# Patient Record
Sex: FEMALE | Race: WHITE | Employment: OTHER | URBAN - METROPOLITAN AREA
[De-identification: names, ages, dates, MRNs, and addresses within clinical notes are randomized per-mention and may not be internally consistent; named-entity substitution may affect disease eponyms.]

---

## 2017-03-09 ENCOUNTER — HOSPITAL ENCOUNTER (OUTPATIENT)
Dept: CT IMAGING | Age: 77
Discharge: HOME | End: 2017-03-09
Payer: SELF-PAY

## 2017-03-09 DIAGNOSIS — I10 HYPERTENSION: ICD-10-CM

## 2017-03-09 DIAGNOSIS — E78.5 HYPERLIPIDEMIA: ICD-10-CM

## 2017-03-09 PROCEDURE — 75571 CT HRT W/O DYE W/CA TEST: CPT

## 2017-03-10 NOTE — CARDIO/PULMONARY
Cardiopulmonary Rehab: I reached this patient by phone and shared her coronary calcium CT score of \"126 \" with her. Education given regarding coronary artery disease and its effects on the cardiovascular system were reviewed. Patient states she is not a smoker. Patient states that she has a family history of vascular disease, reports she is currently taking cholesterol medication and blood pressure medication. Patient states she is not a diabetic, is 20 pounds over weight, reports making heart healthy diet choices and is regularly physically active 2 hours per week. We discussed the recommendations for and potential benefits of weight loss and regular physical activity. Patient does not feel that stress is a risk factor for her. Patient to follow up with primary care physician. Understanding verbalized and no further questions at this time.

## 2017-04-11 ENCOUNTER — HOSPITAL ENCOUNTER (OUTPATIENT)
Dept: MAMMOGRAPHY | Age: 77
Discharge: HOME | End: 2017-04-11
Attending: FAMILY MEDICINE
Payer: MEDICARE

## 2017-04-11 DIAGNOSIS — Z12.31 VISIT FOR SCREENING MAMMOGRAM: ICD-10-CM

## 2017-04-11 PROCEDURE — 77067 SCR MAMMO BI INCL CAD: CPT

## 2018-05-15 ENCOUNTER — HOSPITAL ENCOUNTER (OUTPATIENT)
Dept: MAMMOGRAPHY | Age: 78
Discharge: HOME | End: 2018-05-15
Attending: FAMILY MEDICINE
Payer: MEDICARE

## 2018-05-15 DIAGNOSIS — Z12.31 VISIT FOR SCREENING MAMMOGRAM: ICD-10-CM

## 2018-05-15 PROCEDURE — 77067 SCR MAMMO BI INCL CAD: CPT

## 2019-04-07 ENCOUNTER — HOSPITAL ENCOUNTER (INPATIENT)
Age: 79
LOS: 1 days | Discharge: HOME | DRG: 287 | End: 2019-04-09
Attending: EMERGENCY MEDICINE | Admitting: INTERNAL MEDICINE
Payer: MEDICARE

## 2019-04-07 DIAGNOSIS — R07.9 CHEST PAIN, UNSPECIFIED TYPE: Primary | ICD-10-CM

## 2019-04-07 DIAGNOSIS — I10 HYPERTENSION, UNSPECIFIED TYPE: ICD-10-CM

## 2019-04-07 DIAGNOSIS — I44.7 LBBB (LEFT BUNDLE BRANCH BLOCK): ICD-10-CM

## 2019-04-07 PROCEDURE — 99285 EMERGENCY DEPT VISIT HI MDM: CPT

## 2019-04-07 PROCEDURE — 93005 ELECTROCARDIOGRAM TRACING: CPT

## 2019-04-07 NOTE — Clinical Note
TRANSFER - OUT REPORT:  
 
Verbal report given to: cece candelaria. Report consisted of patient's Situation, Background, Assessment and  
Recommendations(SBAR). Opportunity for questions and clarification was provided. Patient transported with a Registered Nurse and Monitor. Patient transported to: ccu.

## 2019-04-07 NOTE — Clinical Note
Sheath #1: Closed using manual compression. Site secured by Tegaderm. Pressure held for: 12 minutes.

## 2019-04-08 ENCOUNTER — APPOINTMENT (OUTPATIENT)
Dept: GENERAL RADIOLOGY | Age: 79
DRG: 287 | End: 2019-04-08
Attending: EMERGENCY MEDICINE
Payer: MEDICARE

## 2019-04-08 ENCOUNTER — APPOINTMENT (OUTPATIENT)
Dept: NON INVASIVE DIAGNOSTICS | Age: 79
DRG: 287 | End: 2019-04-08
Attending: INTERNAL MEDICINE
Payer: MEDICARE

## 2019-04-08 PROBLEM — I10 UNCONTROLLED HYPERTENSION: Status: ACTIVE | Noted: 2019-04-08

## 2019-04-08 LAB
ACT BLD: 147 SECS (ref 79–138)
ALBUMIN SERPL-MCNC: 3.5 G/DL (ref 3.5–5)
ALBUMIN/GLOB SERPL: 0.9 {RATIO} (ref 1.1–2.2)
ALP SERPL-CCNC: 67 U/L (ref 45–117)
ALT SERPL-CCNC: 18 U/L (ref 12–78)
ANION GAP SERPL CALC-SCNC: 6 MMOL/L (ref 5–15)
APTT PPP: 24.3 SEC (ref 22.1–32)
AST SERPL-CCNC: 15 U/L (ref 15–37)
ATRIAL RATE: 72 BPM
ATRIAL RATE: 74 BPM
BASOPHILS # BLD: 0.1 K/UL (ref 0–0.1)
BASOPHILS NFR BLD: 1 % (ref 0–1)
BILIRUB SERPL-MCNC: 0.4 MG/DL (ref 0.2–1)
BNP SERPL-MCNC: 615 PG/ML (ref 0–450)
BUN SERPL-MCNC: 19 MG/DL (ref 6–20)
BUN SERPL-MCNC: 24 MG/DL (ref 6–20)
BUN/CREAT SERPL: 22 (ref 12–20)
CALCIUM SERPL-MCNC: 9.3 MG/DL (ref 8.5–10.1)
CALCULATED P AXIS, ECG09: 43 DEGREES
CALCULATED P AXIS, ECG09: 65 DEGREES
CALCULATED R AXIS, ECG10: -50 DEGREES
CALCULATED R AXIS, ECG10: -52 DEGREES
CALCULATED T AXIS, ECG11: 103 DEGREES
CALCULATED T AXIS, ECG11: 121 DEGREES
CHLORIDE SERPL-SCNC: 101 MMOL/L (ref 97–108)
CO2 SERPL-SCNC: 31 MMOL/L (ref 21–32)
COMMENT, HOLDF: NORMAL
CREAT SERPL-MCNC: 1.07 MG/DL (ref 0.55–1.02)
DIAGNOSIS, 93000: NORMAL
DIAGNOSIS, 93000: NORMAL
DIFFERENTIAL METHOD BLD: ABNORMAL
ECHO LA MAJOR AXIS: 3.82 CM
ECHO LV INTERNAL DIMENSION DIASTOLIC: 5.33 CM (ref 3.9–5.3)
ECHO LV INTERNAL DIMENSION SYSTOLIC: 4.12 CM
ECHO LV IVSD: 1.07 CM (ref 0.6–0.9)
ECHO LV MASS 2D: 253.4 G (ref 67–162)
ECHO LV MASS INDEX 2D: 124.3 G/M2 (ref 43–95)
ECHO LV POSTERIOR WALL DIASTOLIC: 1.01 CM (ref 0.6–0.9)
ECHO PULMONARY ARTERY SYSTOLIC PRESSURE (PASP): 36 MMHG
ECHO RV INTERNAL DIMENSION: 2.62 CM
EOSINOPHIL # BLD: 0.2 K/UL (ref 0–0.4)
EOSINOPHIL NFR BLD: 2 % (ref 0–7)
ERYTHROCYTE [DISTWIDTH] IN BLOOD BY AUTOMATED COUNT: 12.7 % (ref 11.5–14.5)
GLOBULIN SER CALC-MCNC: 3.8 G/DL (ref 2–4)
GLUCOSE SERPL-MCNC: 117 MG/DL (ref 65–100)
HCT VFR BLD AUTO: 41 % (ref 35–47)
HGB BLD-MCNC: 13.5 G/DL (ref 11.5–16)
IMM GRANULOCYTES # BLD AUTO: 0 K/UL (ref 0–0.04)
IMM GRANULOCYTES NFR BLD AUTO: 0 % (ref 0–0.5)
LYMPHOCYTES # BLD: 2.4 K/UL (ref 0.8–3.5)
LYMPHOCYTES NFR BLD: 33 % (ref 12–49)
MCH RBC QN AUTO: 30.3 PG (ref 26–34)
MCHC RBC AUTO-ENTMCNC: 32.9 G/DL (ref 30–36.5)
MCV RBC AUTO: 91.9 FL (ref 80–99)
MONOCYTES # BLD: 0.9 K/UL (ref 0–1)
MONOCYTES NFR BLD: 13 % (ref 5–13)
NEUTS SEG # BLD: 3.7 K/UL (ref 1.8–8)
NEUTS SEG NFR BLD: 51 % (ref 32–75)
NRBC # BLD: 0 K/UL (ref 0–0.01)
NRBC BLD-RTO: 0 PER 100 WBC
P-R INTERVAL, ECG05: 188 MS
P-R INTERVAL, ECG05: 196 MS
PLATELET # BLD AUTO: 250 K/UL (ref 150–400)
PMV BLD AUTO: 8.6 FL (ref 8.9–12.9)
POTASSIUM SERPL-SCNC: 3.9 MMOL/L (ref 3.5–5.1)
PROT SERPL-MCNC: 7.3 G/DL (ref 6.4–8.2)
Q-T INTERVAL, ECG07: 446 MS
Q-T INTERVAL, ECG07: 468 MS
QRS DURATION, ECG06: 170 MS
QRS DURATION, ECG06: 172 MS
QTC CALCULATION (BEZET), ECG08: 495 MS
QTC CALCULATION (BEZET), ECG08: 512 MS
RBC # BLD AUTO: 4.46 M/UL (ref 3.8–5.2)
SAMPLES BEING HELD,HOLD: NORMAL
SODIUM SERPL-SCNC: 138 MMOL/L (ref 136–145)
THERAPEUTIC RANGE,PTTT: NORMAL SECS (ref 58–77)
TROPONIN I BLD-MCNC: <0.04 NG/ML (ref 0–0.08)
TROPONIN I SERPL-MCNC: 0.56 NG/ML
TROPONIN I SERPL-MCNC: 0.62 NG/ML
VENTRICULAR RATE, ECG03: 72 BPM
VENTRICULAR RATE, ECG03: 74 BPM
WBC # BLD AUTO: 7.3 K/UL (ref 3.6–11)

## 2019-04-08 PROCEDURE — 84520 ASSAY OF UREA NITROGEN: CPT

## 2019-04-08 PROCEDURE — 93306 TTE W/DOPPLER COMPLETE: CPT

## 2019-04-08 PROCEDURE — 74011636320 HC RX REV CODE- 636/320: Performed by: INTERNAL MEDICINE

## 2019-04-08 PROCEDURE — 74011250636 HC RX REV CODE- 250/636: Performed by: INTERNAL MEDICINE

## 2019-04-08 PROCEDURE — 74011250637 HC RX REV CODE- 250/637: Performed by: EMERGENCY MEDICINE

## 2019-04-08 PROCEDURE — 77030004538 HC CATH ANGI DX MP BSC -A: Performed by: INTERNAL MEDICINE

## 2019-04-08 PROCEDURE — 4A023N7 MEASUREMENT OF CARDIAC SAMPLING AND PRESSURE, LEFT HEART, PERCUTANEOUS APPROACH: ICD-10-PCS | Performed by: INTERNAL MEDICINE

## 2019-04-08 PROCEDURE — 99153 MOD SED SAME PHYS/QHP EA: CPT | Performed by: INTERNAL MEDICINE

## 2019-04-08 PROCEDURE — 85347 COAGULATION TIME ACTIVATED: CPT

## 2019-04-08 PROCEDURE — 84484 ASSAY OF TROPONIN QUANT: CPT

## 2019-04-08 PROCEDURE — 74011250636 HC RX REV CODE- 250/636: Performed by: EMERGENCY MEDICINE

## 2019-04-08 PROCEDURE — B2151ZZ FLUOROSCOPY OF LEFT HEART USING LOW OSMOLAR CONTRAST: ICD-10-PCS | Performed by: INTERNAL MEDICINE

## 2019-04-08 PROCEDURE — 85025 COMPLETE CBC W/AUTO DIFF WBC: CPT

## 2019-04-08 PROCEDURE — 71045 X-RAY EXAM CHEST 1 VIEW: CPT

## 2019-04-08 PROCEDURE — 36415 COLL VENOUS BLD VENIPUNCTURE: CPT

## 2019-04-08 PROCEDURE — 80053 COMPREHEN METABOLIC PANEL: CPT

## 2019-04-08 PROCEDURE — B2111ZZ FLUOROSCOPY OF MULTIPLE CORONARY ARTERIES USING LOW OSMOLAR CONTRAST: ICD-10-PCS | Performed by: INTERNAL MEDICINE

## 2019-04-08 PROCEDURE — 93005 ELECTROCARDIOGRAM TRACING: CPT

## 2019-04-08 PROCEDURE — 74011000258 HC RX REV CODE- 258: Performed by: INTERNAL MEDICINE

## 2019-04-08 PROCEDURE — B41F1ZZ FLUOROSCOPY OF RIGHT LOWER EXTREMITY ARTERIES USING LOW OSMOLAR CONTRAST: ICD-10-PCS | Performed by: INTERNAL MEDICINE

## 2019-04-08 PROCEDURE — 74011250636 HC RX REV CODE- 250/636

## 2019-04-08 PROCEDURE — 93458 L HRT ARTERY/VENTRICLE ANGIO: CPT | Performed by: INTERNAL MEDICINE

## 2019-04-08 PROCEDURE — 85730 THROMBOPLASTIN TIME PARTIAL: CPT

## 2019-04-08 PROCEDURE — 83880 ASSAY OF NATRIURETIC PEPTIDE: CPT

## 2019-04-08 PROCEDURE — 77030013744: Performed by: INTERNAL MEDICINE

## 2019-04-08 PROCEDURE — 77030013715 HC INFL SYS MRTM -B: Performed by: INTERNAL MEDICINE

## 2019-04-08 PROCEDURE — 74011250637 HC RX REV CODE- 250/637: Performed by: INTERNAL MEDICINE

## 2019-04-08 PROCEDURE — 77010033678 HC OXYGEN DAILY

## 2019-04-08 PROCEDURE — C1894 INTRO/SHEATH, NON-LASER: HCPCS | Performed by: INTERNAL MEDICINE

## 2019-04-08 PROCEDURE — 65620000000 HC RM CCU GENERAL

## 2019-04-08 PROCEDURE — 99152 MOD SED SAME PHYS/QHP 5/>YRS: CPT | Performed by: INTERNAL MEDICINE

## 2019-04-08 PROCEDURE — 77030039046 HC PAD DEFIB RADIOTRNSPNT CNMD -B: Performed by: INTERNAL MEDICINE

## 2019-04-08 RX ORDER — SODIUM CHLORIDE 0.9 % (FLUSH) 0.9 %
5-40 SYRINGE (ML) INJECTION EVERY 8 HOURS
Status: DISCONTINUED | OUTPATIENT
Start: 2019-04-08 | End: 2019-04-08

## 2019-04-08 RX ORDER — PRAVASTATIN SODIUM 20 MG/1
20 TABLET ORAL DAILY
Status: DISCONTINUED | OUTPATIENT
Start: 2019-04-08 | End: 2019-04-09 | Stop reason: HOSPADM

## 2019-04-08 RX ORDER — SODIUM CHLORIDE 0.9 % (FLUSH) 0.9 %
5-40 SYRINGE (ML) INJECTION EVERY 8 HOURS
Status: DISCONTINUED | OUTPATIENT
Start: 2019-04-08 | End: 2019-04-09 | Stop reason: HOSPADM

## 2019-04-08 RX ORDER — LISINOPRIL 5 MG/1
5 TABLET ORAL DAILY
Status: DISCONTINUED | OUTPATIENT
Start: 2019-04-08 | End: 2019-04-09 | Stop reason: HOSPADM

## 2019-04-08 RX ORDER — LIDOCAINE HYDROCHLORIDE 10 MG/ML
INJECTION INFILTRATION; PERINEURAL AS NEEDED
Status: DISCONTINUED | OUTPATIENT
Start: 2019-04-08 | End: 2019-04-08 | Stop reason: HOSPADM

## 2019-04-08 RX ORDER — HYDROMORPHONE HYDROCHLORIDE 1 MG/ML
0.5 INJECTION, SOLUTION INTRAMUSCULAR; INTRAVENOUS; SUBCUTANEOUS
Status: DISPENSED | OUTPATIENT
Start: 2019-04-08 | End: 2019-04-08

## 2019-04-08 RX ORDER — NITROGLYCERIN 0.4 MG/1
0.4 TABLET SUBLINGUAL
Status: COMPLETED | OUTPATIENT
Start: 2019-04-08 | End: 2019-04-08

## 2019-04-08 RX ORDER — HEPARIN SODIUM 200 [USP'U]/100ML
INJECTION, SOLUTION INTRAVENOUS
Status: COMPLETED | OUTPATIENT
Start: 2019-04-08 | End: 2019-04-08

## 2019-04-08 RX ORDER — ACETAMINOPHEN 325 MG/1
650 TABLET ORAL
Status: DISCONTINUED | OUTPATIENT
Start: 2019-04-08 | End: 2019-04-09 | Stop reason: HOSPADM

## 2019-04-08 RX ORDER — CARVEDILOL 3.12 MG/1
3.12 TABLET ORAL 2 TIMES DAILY WITH MEALS
Status: DISCONTINUED | OUTPATIENT
Start: 2019-04-08 | End: 2019-04-09 | Stop reason: HOSPADM

## 2019-04-08 RX ORDER — SODIUM CHLORIDE 9 MG/ML
INJECTION, SOLUTION INTRAVENOUS
Status: COMPLETED | OUTPATIENT
Start: 2019-04-08 | End: 2019-04-08

## 2019-04-08 RX ORDER — SODIUM CHLORIDE 0.9 % (FLUSH) 0.9 %
5-40 SYRINGE (ML) INJECTION AS NEEDED
Status: DISCONTINUED | OUTPATIENT
Start: 2019-04-08 | End: 2019-04-09 | Stop reason: HOSPADM

## 2019-04-08 RX ORDER — ENOXAPARIN SODIUM 100 MG/ML
40 INJECTION SUBCUTANEOUS EVERY 24 HOURS
Status: DISCONTINUED | OUTPATIENT
Start: 2019-04-08 | End: 2019-04-09 | Stop reason: HOSPADM

## 2019-04-08 RX ORDER — LEVOTHYROXINE SODIUM 75 UG/1
75 TABLET ORAL
Status: DISCONTINUED | OUTPATIENT
Start: 2019-04-08 | End: 2019-04-09 | Stop reason: HOSPADM

## 2019-04-08 RX ORDER — LORATADINE 10 MG/1
10 TABLET ORAL DAILY
Status: DISCONTINUED | OUTPATIENT
Start: 2019-04-08 | End: 2019-04-09 | Stop reason: HOSPADM

## 2019-04-08 RX ORDER — HEPARIN SODIUM 5000 [USP'U]/ML
4000 INJECTION, SOLUTION INTRAVENOUS; SUBCUTANEOUS ONCE
Status: COMPLETED | OUTPATIENT
Start: 2019-04-08 | End: 2019-04-08

## 2019-04-08 RX ORDER — MIDAZOLAM HYDROCHLORIDE 1 MG/ML
INJECTION, SOLUTION INTRAMUSCULAR; INTRAVENOUS AS NEEDED
Status: DISCONTINUED | OUTPATIENT
Start: 2019-04-08 | End: 2019-04-08 | Stop reason: HOSPADM

## 2019-04-08 RX ORDER — FENTANYL CITRATE 50 UG/ML
INJECTION, SOLUTION INTRAMUSCULAR; INTRAVENOUS AS NEEDED
Status: DISCONTINUED | OUTPATIENT
Start: 2019-04-08 | End: 2019-04-08 | Stop reason: HOSPADM

## 2019-04-08 RX ORDER — GUAIFENESIN 100 MG/5ML
324 LIQUID (ML) ORAL
Status: COMPLETED | OUTPATIENT
Start: 2019-04-08 | End: 2019-04-08

## 2019-04-08 RX ORDER — SODIUM CHLORIDE 0.9 % (FLUSH) 0.9 %
5-40 SYRINGE (ML) INJECTION AS NEEDED
Status: DISCONTINUED | OUTPATIENT
Start: 2019-04-08 | End: 2019-04-08

## 2019-04-08 RX ADMIN — NITROGLYCERIN 1 INCH: 20 OINTMENT TOPICAL at 01:11

## 2019-04-08 RX ADMIN — NITROGLYCERIN 0.4 MG: 0.4 TABLET, ORALLY DISINTEGRATING SUBLINGUAL at 02:29

## 2019-04-08 RX ADMIN — Medication 10 ML: at 22:00

## 2019-04-08 RX ADMIN — ACETAMINOPHEN 650 MG: 325 TABLET ORAL at 11:35

## 2019-04-08 RX ADMIN — CARVEDILOL 3.12 MG: 3.12 TABLET, FILM COATED ORAL at 08:23

## 2019-04-08 RX ADMIN — CARVEDILOL 3.12 MG: 3.12 TABLET, FILM COATED ORAL at 16:50

## 2019-04-08 RX ADMIN — NITROGLYCERIN 0.4 MG: 0.4 TABLET, ORALLY DISINTEGRATING SUBLINGUAL at 02:24

## 2019-04-08 RX ADMIN — ENOXAPARIN SODIUM 40 MG: 40 INJECTION SUBCUTANEOUS at 04:23

## 2019-04-08 RX ADMIN — Medication 10 ML: at 04:25

## 2019-04-08 RX ADMIN — LORATADINE 10 MG: 10 TABLET ORAL at 08:23

## 2019-04-08 RX ADMIN — Medication 10 ML: at 04:24

## 2019-04-08 RX ADMIN — ASPIRIN 81 MG CHEWABLE TABLET 324 MG: 81 TABLET CHEWABLE at 00:27

## 2019-04-08 RX ADMIN — NITROGLYCERIN 0.4 MG: 0.4 TABLET, ORALLY DISINTEGRATING SUBLINGUAL at 02:19

## 2019-04-08 RX ADMIN — PRAVASTATIN SODIUM 20 MG: 20 TABLET ORAL at 08:23

## 2019-04-08 RX ADMIN — LEVOTHYROXINE SODIUM 75 MCG: 75 TABLET ORAL at 07:10

## 2019-04-08 RX ADMIN — HEPARIN SODIUM 4000 UNITS: 5000 INJECTION, SOLUTION INTRAVENOUS; SUBCUTANEOUS at 02:22

## 2019-04-08 RX ADMIN — LISINOPRIL 5 MG: 5 TABLET ORAL at 08:23

## 2019-04-08 NOTE — ROUTINE PROCESS
16:00 Bedside shift change report given to Reyna Brian (oncoming nurse) by  Colette Louise nurseTamera. Report included the following information SBAR, Kardex, Procedure Summary, Intake/Output, Accordion, Cardiac Rhythm NSR left BBB, Alarm Parameters , Pre Procedure Checklist, Procedure Verification and Quality Measures. 18:00 Resting, no complaints accept she was hoping to go home today. Aware Dr. Anjelica Brandon will be rounding tomorrow and she hopes to go home at that time. 19:30 Bedside shift change report given to Cindy (oncoming nurse) by Reyna Brian (offgoing nurse). Report included the following information SBAR, Kardex, Procedure Summary, Intake/Output, MAR, Accordion, Recent Results, Med Rec Status, Cardiac Rhythm nsr left bbb, Alarm Parameters , Pre Procedure Checklist, Procedure Verification and Quality Measures.

## 2019-04-08 NOTE — PROGRESS NOTES
Cardiac Cath Lab Procedure Area Arrival Note: 
 
Kaitlynn Kelly arrived to Cardiac Cath Lab, Procedure Area. Patient identifiers verified with NAME and DATE OF BIRTH. Procedure verified with patient. Consent forms verified. Allergies verified. Patient informed of procedure and plan of care. Questions answered with review. Patient voiced understanding of procedure and plan of care. Patient on cardiac monitor, non-invasive blood pressure, SPO2 monitor. On RA and placed on O2 @ 2 lpm via NC.  IV of NSS on pump at 291 ml/hr per HARRISON protocol. Patient status doing well without problems. Patient is A&Ox 4. Patient reports 1/10 midsternal chest pain. Patient medicated during procedure with orders obtained and verified by Dr. Otis Campa and Dr. Diana Lucero. Refer to patients Cardiac Cath Lab PROCEDURE REPORT for vital signs, assessment, status, and response during procedure, printed at end of case. Printed report on chart or scanned into chart.

## 2019-04-08 NOTE — ED NOTES
MD to bedside to update patient. Per nursing supervision send pt to [facility] ER to call oncall cath lab.  Hold Dilaudid per ER MD.

## 2019-04-08 NOTE — ED PROVIDER NOTES
Deannie Prader is a 69 yo F with history of hypertension, hycholesterol and prior arrhythmia (patient does not know what type) for which she takes flecainide who presents to the ED with chest pain. She states that she first noticed pain in her proximal left arm around 11am when she was lifting a 40 lb bag of bird seed. The pain went away shortly after lifting the bags and she was pain free for the whole afternoon. This evening around 9:30 the pain returned when she was at rest, watching TV. The pain continued and then she also felt pressure in the middle of her chest so she came to the ED for evaluation. Since coming to the ED her left arm pain as resolved and the chest pressure has diminished but she continues to have some pain which she rates at 3/10. At its peak the pain was 8/10 at home. Past Medical History:  
Diagnosis Date  Arrhythmia   
 attempted ablation  Arthritis  GERD (gastroesophageal reflux disease)  High cholesterol  History of seasonal allergies  Hypertension  Thyroid disease Past Surgical History:  
Procedure Laterality Date  BREAST SURGERY PROCEDURE UNLISTED    
 breast biopsy-local - benign  CARDIAC SURG PROCEDURE UNLIST    
 attempted ablation  HX BREAST BIOPSY Right Years ago Negative  HX CATARACT REMOVAL    
 bilateral  
 HX OTHER SURGICAL    
 cyst removed from left cheek Family History:  
Problem Relation Age of Onset  Other Mother   
     multiple myeloma  Stroke Father  Heart Disease Brother   
     ablation Social History Socioeconomic History  Marital status:  Spouse name: Not on file  Number of children: Not on file  Years of education: Not on file  Highest education level: Not on file Occupational History  Not on file Social Needs  Financial resource strain: Not on file  Food insecurity:  
  Worry: Not on file Inability: Not on file  Transportation needs:  
  Medical: Not on file Non-medical: Not on file Tobacco Use  Smoking status: Former Smoker Years: 10.00 Last attempt to quit: 1974 Years since quittin.9  Smokeless tobacco: Never Used Substance and Sexual Activity  Alcohol use: Yes Alcohol/week: 0.0 oz Types: 3 - 4 Glasses of wine per week  Drug use: No  
 Sexual activity: Not on file Lifestyle  Physical activity:  
  Days per week: Not on file Minutes per session: Not on file  Stress: Not on file Relationships  Social connections:  
  Talks on phone: Not on file Gets together: Not on file Attends Judaism service: Not on file Active member of club or organization: Not on file Attends meetings of clubs or organizations: Not on file Relationship status: Not on file  Intimate partner violence:  
  Fear of current or ex partner: Not on file Emotionally abused: Not on file Physically abused: Not on file Forced sexual activity: Not on file Other Topics Concern  Not on file Social History Narrative  Not on file ALLERGIES: Patient has no known allergies. Review of Systems Constitutional: Negative for fever. HENT: Negative for sore throat. Eyes: Negative for visual disturbance. Respiratory: Negative for cough. Cardiovascular: Positive for chest pain. Gastrointestinal: Negative for abdominal pain. Genitourinary: Negative for dysuria. Musculoskeletal: Negative for back pain. Left arm pain Skin: Negative for rash. Neurological: Negative for headaches. Vitals:  
 19 2348 19 2349 19 2351 BP: (!) 161/103 Temp:   97.7 °F (36.5 °C) SpO2:  96% Physical Exam  
Constitutional: She appears well-developed and well-nourished. No distress. HENT:  
Head: Normocephalic and atraumatic.   
Mouth/Throat: Oropharynx is clear and moist.  
 Eyes: Conjunctivae and EOM are normal.  
Neck: Normal range of motion and phonation normal.  
Cardiovascular: Normal rate, regular rhythm and normal heart sounds. No murmur heard. Pulmonary/Chest: Effort normal. No respiratory distress. She has no wheezes. She has no rales. She exhibits no tenderness. Abdominal: Soft. She exhibits no distension. Musculoskeletal: Normal range of motion. She exhibits no tenderness. Neurological: She is alert. She is not disoriented. She exhibits normal muscle tone. Skin: Skin is warm and dry. Capillary refill takes less than 2 seconds. Nursing note and vitals reviewed. ED EKG interpretation: 
Rhythm: normal sinus rhythm and LBBB; and regular . Rate (approx.): 74; Axis: left axis deviation; P wave: normal; QRS interval: prolonged; ST/T wave: non-specific changes; Other findings: abnormal ekg, no prior EKG available for comparison but compared to rhythm strip from 7/6/15, LBBB appears new. . This EKG was interpreted by Latesha Barrera MD,ED Provider. MDM 
   
1:13 AM 
Discussed with Dr Lauryn Smith, hospitalist patient with chest pain, EKG with possible new LBBB, no prior insystem. Initial POC trop normal.  Will admit 1:55 AM 
Discussed with Dr. Olivia Zaragoza, cardiology. Possible new LBBB and chest pain/HTN. Texted EKG from [facility]. Will review. 2:05 AM 
Dr. Olivia Zaragoza called back, reviewed EKG sent via tiger text and was able to find EKG from this fall and LBBB is new. With continued pain would recommend proceeding with emergent cath. 2:07 AM 
Called hospital  it initiate STEMI medical alert. Ordered additional SL NTG, heparin gtt. Updated hospitalist, Dr. Lauryn Smith on change of plan.  
 
2:29 AM 
Critical care at bedside to transport patient. Total critical care time spent exclusive of procedures:  35 minutes Procedures

## 2019-04-08 NOTE — PROGRESS NOTES
0345: TRANSFER - IN REPORT: 
 
Verbal report received from [id number] [facility] (name) on Bobbi Roldan  being received from Cath Lab (unit) for routine progression of care Report consisted of patients Situation, Background, Assessment and  
Recommendations(SBAR). Information from the following report(s) SBAR, Kardex, Intake/Output, MAR and Recent Results was reviewed with the receiving nurse. Opportunity for questions and clarification was provided. Assessment completed upon patients arrival to unit and care assumed. 0400: Patient arrived to CCU. A&O x4. Right groin cath site no bleeding and no hematoma. Pulses palpable. VSS and Call bell within reach. Primary Nurse Cristian Dawson RN and Alejandro Jain RN performed a dual skin assessment on this patient No impairment noted Current Bed:Total Care SPORT (air with burgundy cover) Jhonny score is 20 Patient: Bobbi Roldan MRN: [id number]    Age: 70 y.o. YOB: 1940 Room/Bed: [facility]/ Consent for video monitoring obtained after the below has been explained to the patient/family on 4/8/2019: 
1. They are being monitored continuously in an effort to promote their safety. 2. That there may be times when the camera will be discontinued to provide care to me to ensure my dignity, such as during bathing or any activity that risks me being exposed. 3. They have the right to opt out of having this surveillance monitoring at any time. 4. It has been explained to the patient/family and they understand that the hospital does not maintain any recording of this surveillance monitoring. Cristian Dawson RN 
 
2069: Bedside and Verbal shift change report given to April Ross  (oncoming nurse) by Shreya Way (offgoing nurse). Report included the following information SBAR, Kardex, Intake/Output, MAR and Recent Results.

## 2019-04-08 NOTE — ED NOTES
Pt states CP has improved minimally between 2nd and 3rd nitro. Pt dropped to 90% with good pleth. Placed on 2L of 02. Critical care present at bedside.

## 2019-04-08 NOTE — PROGRESS NOTES
Problem: Patient Education: Go to Patient Education Activity Goal: Patient/Family Education Outcome: Progressing Towards Goal 
  
Problem: Cath Lab Procedures: Pre-Procedure Goal: Activity/Safety Outcome: Progressing Towards Goal 
Goal: Consults, if ordered Outcome: Progressing Towards Goal 
Goal: Diagnostic Test/Procedures Outcome: Progressing Towards Goal 
Goal: Nutrition/Diet Outcome: Progressing Towards Goal 
Goal: Discharge Planning Outcome: Progressing Towards Goal 
Goal: Medications Outcome: Progressing Towards Goal 
Goal: Respiratory Outcome: Progressing Towards Goal 
Goal: Treatments/Interventions/Procedures Outcome: Progressing Towards Goal 
Goal: Psychosocial 
Outcome: Progressing Towards Goal 
Goal: *Verbalize description of procedure Outcome: Progressing Towards Goal 
Goal: *Consent signed Outcome: Progressing Towards Goal 
  
Problem: Cath Lab Procedures: Post-Cath Day of Procedure (Initiate SCIP Measures for Post-Op Care) Goal: Activity/Safety Outcome: Progressing Towards Goal 
Goal: Consults, if ordered Outcome: Progressing Towards Goal 
Goal: Diagnostic Test/Procedures Outcome: Progressing Towards Goal 
Goal: Nutrition/Diet Outcome: Progressing Towards Goal 
Goal: Discharge Planning Outcome: Progressing Towards Goal 
Goal: Medications Outcome: Progressing Towards Goal 
Goal: Respiratory Outcome: Progressing Towards Goal 
Goal: Treatments/Interventions/Procedures Outcome: Progressing Towards Goal 
Goal: Psychosocial 
Outcome: Progressing Towards Goal 
Goal: *Procedure site is without bleeding and signs of infection six hours post sheath removal 
Outcome: Progressing Towards Goal 
Goal: *Hemodynamically stable Outcome: Progressing Towards Goal 
Goal: *Optimal pain control at patient's stated goal 
Outcome: Progressing Towards Goal 
  
Problem: Falls - Risk of 
Goal: *Absence of Falls Description Document Durenda Tawanda Fall Risk and appropriate interventions in the flowsheet. Outcome: Progressing Towards Goal 
  
Problem: Patient Education: Go to Patient Education Activity Goal: Patient/Family Education Outcome: Progressing Towards Goal 
  
Problem: Pain Goal: *Control of Pain Outcome: Progressing Towards Goal 
Goal: *PALLIATIVE CARE:  Alleviation of Pain Outcome: Progressing Towards Goal 
  
Problem: Patient Education: Go to Patient Education Activity Goal: Patient/Family Education Outcome: Progressing Towards Goal 
  
Problem: Pressure Injury - Risk of 
Goal: *Prevention of pressure injury Description Document Jhonny Scale and appropriate interventions in the flowsheet. Outcome: Progressing Towards Goal 
  
Problem: Patient Education: Go to Patient Education Activity Goal: Patient/Family Education Outcome: Progressing Towards Goal 
  
Problem: Patient Education: Go to Patient Education Activity Goal: Patient/Family Education Outcome: Progressing Towards Goal 
  
Problem: Heart Failure: Day 1 Goal: Activity/Safety Outcome: Progressing Towards Goal 
Goal: Consults, if ordered Outcome: Progressing Towards Goal 
Goal: Diagnostic Test/Procedures Outcome: Progressing Towards Goal 
Goal: Nutrition/Diet Outcome: Progressing Towards Goal 
Goal: Discharge Planning Outcome: Progressing Towards Goal 
Goal: Medications Outcome: Progressing Towards Goal 
Goal: Respiratory Outcome: Progressing Towards Goal 
Goal: Treatments/Interventions/Procedures Outcome: Progressing Towards Goal 
Goal: Psychosocial 
Outcome: Progressing Towards Goal 
Goal: *Oxygen saturation within defined limits Outcome: Progressing Towards Goal 
Goal: *Hemodynamically stable Outcome: Progressing Towards Goal 
Goal: *Optimal pain control at patient's stated goal 
Outcome: Progressing Towards Goal 
Goal: *Anxiety reduced or absent Outcome: Progressing Towards Goal

## 2019-04-08 NOTE — PROGRESS NOTES
Spiritual Care Assessment/Progress Note [address] 
 
 
NAME: Ezio Villanueva      MRN: [id number] AGE: 70 y.o. SEX: female Alevism Affiliation: Anabaptist Language: [place] 4/8/2019 Spiritual Assessment begun in [facility] 4 CORONARY CARE through conversation with: 
  
    [x]Patient        [x] Family    [] Friend(s) Reason for Consult: Initial/Spiritual assessment, critical care Spiritual beliefs: (Please include comment if needed) [x] Identifies with a janie tradition:  Our [address] of [employer]   
   [x] Supported by a janie community:        
   [] Claims no spiritual orientation:       
   [] Seeking spiritual identity:            
   [] Adheres to an individual form of spirituality:       
   [] Not able to assess:                   
 
    
Identified resources for coping:  
   [x] Prayer                           
   [] Music                  [] Guided Imagery [x] Family/friends                 [] Pet visits [] Devotional reading                         [] Unknown 
   [] Other:                                         
 
 
Interventions offered during this visit: (See comments for more details) Patient Interventions: Affirmation of emotions/emotional suffering, Catharsis/review of pertinent events in supportive environment, Initial/Spiritual assessment, Critical care, Prayer (assurance of) Family/Friend(s): Affirmation of emotions/emotional suffering, Initial Assessment, Prayer (assurance of) Plan of Care: 
 
 [] Support spiritual and/or cultural needs  
 [] Support AMD and/or advance care planning process    
 [] Support grieving process 
 [] Coordinate Rites and/or Rituals  
 [] Coordination with community clergy 
 [x] No spiritual needs identified at this time 
 [] Detailed Plan of Care below (See Comments)  [] Make referral to Music Therapy 
[] Make referral to Pet Therapy    
[] Make referral to Addiction services [] Make referral to [facility] 
[] Make referral to Spiritual Care Partner 
[] No future visits requested       
[x] Follow up visits as needed Comments: Visited Mrs Durga Good in [place] for initial spiritual assessment. Mrs Durga Good was sitting on the side of the bed talking to her  when  arrived; patient and  appeared in good spirits. Provided active listening as patient shared about her health issues and what her physician had told her. She denied having any specific concerns at that time. Mrs Durga Good stated that she was a member of [employer] of [employer]. She stated that the Ghazala Welch, had been to see her this morning; stated they had known each other for many years. Assured patient and family of prayers on their behalf and of ongoing  availability for support. : . Carlota Herrmann. Markus Stallings; [facility], to contact [phone] [address] call: [phone]

## 2019-04-08 NOTE — PROGRESS NOTES
0305: TRANSFER - IN REPORT: 
 
Verbal report received from [facility], Critical care transport RN (name) on Electa Drum  being received from [place] (unit) for ordered procedure Report consisted of patients Situation, Background, Assessment and  
Recommendations(SBAR). Information from the following report(s) SBAR, ED Summary and MAR was reviewed with the receiving nurse. Opportunity for questions and clarification was provided. Assessment completed upon patients arrival to unit and care assumed. 0400: TRANSFER - OUT REPORT: 
 
Verbal report given to Bernard Man RN (name) on Electa Drum  being transferred to CCU (unit) for routine progression of care Report consisted of patients Situation, Background, Assessment and  
Recommendations(SBAR). Information from the following report(s) SBAR, ED Summary, MAR and Cardiac Rhythm NSR, LBBB was reviewed with the receiving nurse. Lines:  
Peripheral IV 04/08/19 Left Antecubital (Active) Site Assessment Clean, dry, & intact 4/8/2019 12:23 AM  
Dressing Status Clean, dry, & intact 4/8/2019 12:23 AM  
  
 
Opportunity for questions and clarification was provided. Patient transported with: 
 Monitor O2 @ 2 liters Registered Nurse Tech

## 2019-04-08 NOTE — PROGRESS NOTES
The following herbal, alternative, and/or nutritional/dietary supplement product(s) has been discontinued  per [facility] approved policy:   
co-enzyme J-32 (COQ-10) 100 mg capsule daily Please reorder upon discharge if appropriate.

## 2019-04-08 NOTE — PROGRESS NOTES
I, on behalf of our adult hospitalist service, was asked by ED MD Dr. Krystal Valenzuela at [facility] ED to provide admission orders for transfer to [address] with initial diagnosis of chest pain, LBBB, and uncontrolled hypertension. Code STEMI was later called and patient was transferred to [address] [facility], where she immediately was taken to cardiac cath lab. Afterwards, patient was transported to the CCU. I have communicated with cardiologist Dr. Katerin Colvin who notes that his cardiologist service will be the primary attending and continue with patient cares. As such, I will sign off.

## 2019-04-08 NOTE — ED NOTES
TRANSFER - OUT REPORT: 
 
Verbal report given to Thai Reynolds RN (name) on Harrison Almazan  being transferred to [facility] ED to cath lab (unit) for routine progression of care Report consisted of patients Situation, Background, Assessment and  
Recommendations(SBAR). Information from the following report(s) SBAR, ED Summary, Intake/Output, MAR, Recent Results and Cardiac Rhythm NSR left BBB was reviewed with the receiving nurse. Lines:  
Peripheral IV 04/08/19 Left Antecubital (Active) Site Assessment Clean, dry, & intact 4/8/2019 12:23 AM  
Dressing Status Clean, dry, & intact 4/8/2019 12:23 AM  
  
 
Opportunity for questions and clarification was provided.    
 
Patient transported with: 
 Critical Care Team

## 2019-04-08 NOTE — H&P
Cardiology STEMI Admission Note Impression:  
Brenda Ga is a 70 y.o. female with new LBBB and cp. # CP - concerning ECG. Plan for urgent [facility]. Risks/benefits discussed. #HTN # SVT - history of this and was on flecainide. If CAD would stop. Addendum: 3:35 AM - Cath without CAD, EF 30%. Suggestive of takotsubo cardiomyopathy. Plan admit CCU. Start coreg/lisinopril. Stop flecainide. Plan: 1. Admit to CCU for close observation 2. ACC based regimen including aggressive anticoagulation as ordered, beta blockers unless significant bradycardia, and aggressive lipid reduction therapy 3. All risks/benefits/alternatives/complications of emergency cardiac catheterization discussed with patient and available family members -- will proceed urgently to the cardiac catheterization laboratory Subjective: 
Brenda Ga is a 70 y.o. female who is being seen on consult for STEMI. Admission diagnosis:  
Active Problems: LBBB (left bundle branch block) (4/8/2019) Acute chest pain (4/8/2019) Uncontrolled hypertension (4/8/2019) HPI:  70 yof with history of HTN, Hyperchol, SVT with negative EPS previously maintained on flecainide [place] [facility] with CP. Pain started around 11 AM, she had lifted a heavy bag of bird seed. Pain recurred this evening without any trigger. She was sitting, watching TV. Some diaphoresis, but no nausea. Non pluritic cp. I follow her for SVT, she has previously had a negative EPS. She was last seen in the office in October. ECG today shows new LBBB, previously ECG was normal. 
 
Since her last office visit, she had elevated bp. Dr. Deshaun Ansari started a bp med but she did not feel well and stopped a month later. No other complaints. Past Medical History:  
Diagnosis Date  Arrhythmia   
 attempted ablation  Arthritis  GERD (gastroesophageal reflux disease)  High cholesterol  History of seasonal allergies  Hypertension  Thyroid disease Past Surgical History:  
Procedure Laterality Date  BREAST SURGERY PROCEDURE UNLISTED    
 breast biopsy-local - benign  CARDIAC SURG PROCEDURE UNLIST    
 attempted ablation  HX BREAST BIOPSY Right Years ago Negative  HX CATARACT REMOVAL    
 bilateral  
 HX OTHER SURGICAL    
 cyst removed from left cheek Social History Socioeconomic History  Marital status:  Spouse name: Not on file  Number of children: Not on file  Years of education: Not on file  Highest education level: Not on file Tobacco Use  Smoking status: Former Smoker Years: 10.00 Last attempt to quit: 1974 Years since quittin.9  Smokeless tobacco: Never Used Substance and Sexual Activity  Alcohol use: Yes Alcohol/week: 0.0 oz Types: 3 - 4 Glasses of wine per week  Drug use: No  
 
Family History Problem Relation Age of Onset  Other Mother   
     multiple myeloma  Stroke Father  Heart Disease Brother   
     ablation No Known Allergies Home Medications:  
 
Prior to Admission medications Medication Sig Start Date End Date Taking? Authorizing Provider  
levothyroxine (SYNTHROID) 75 mcg tablet Take 75 mcg by mouth Daily (before breakfast). Yes Provider, Historical  
loratadine (CLARITIN) 10 mg tablet Take 10 mg by mouth daily. Yes Provider, Historical  
pravastatin (PRAVACHOL) 20 mg tablet Take 20 mg by mouth daily. Yes Provider, Historical  
co-enzyme Q-10 (COQ-10) 100 mg capsule Take 100 mg by mouth daily. Yes Provider, Historical  
flecainide (TAMBOCOR) 100 mg tablet Take 100 mg by mouth two (2) times a day. Yes Provider, Historical  
ASPIRIN PO Take 81 mg by mouth every other day. Provider, Historical  
bumetanide (BUMEX) 1 mg tablet Take 1 mg by mouth daily. Provider, Historical  
verapamil 240 mg CR tablet Take 240 mg by mouth two (2) times a day.     Provider, Historical  
 
 
 Hosp Medications:  
 
Current Facility-Administered Medications Medication Dose Route Frequency  HYDROmorphone (PF) (DILAUDID) injection 0.5 mg  0.5 mg IntraVENous NOW  
 0.9% sodium chloride infusion    CONTINUOUS Review of Systems: No hemoptysis, hematemesis, epistaxis, melena, hematuria. No fevers,  Rashes, seizures, visual disturbances, difficulty walking, no abdominal pain. Physical Assessment:  
 
Visit Vitals /86 Pulse 74 Temp 97.7 °F (36.5 °C) Resp 20 Ht 5' 7\" (1.702 m) Wt 96.9 kg (213 lb 10 oz) SpO2 93% BMI 33.46 kg/m² HEENT: [place], EOMI; oral mucosa well prefused; Conjunctiva not injected, Neck: No JVD, Negative carotid bruits, normal pulses; No thyromegaly Resp: CTA bilaterally; No wheezes or rales CV: RRR s1s2 No murmurs or rubs; PMI not displaced Abd: Positive Bowel Sounds, Soft, Nontender Ext: No C/C/E, Neuro: A&Ox3, Nonfocal; Muscle strength and tone symmetric Skin: Warm, Dry, Intact Pulses: 2+ DP/PT/Rad EKG: SR, LBBB Labs: 
 
Recent Results (from the past 24 hour(s)) EKG, 12 LEAD, INITIAL Collection Time: 04/07/19 11:48 PM  
Result Value Ref Range Ventricular Rate 74 BPM  
 Atrial Rate 74 BPM  
 P-R Interval 188 ms QRS Duration 172 ms Q-T Interval 446 ms  
 QTC Calculation (Bezet) 495 ms Calculated P Axis 43 degrees Calculated R Axis -50 degrees Calculated T Axis 103 degrees Diagnosis Normal sinus rhythm Left axis deviation Left bundle branch block Abnormal ECG No previous ECGs available SAMPLES BEING HELD Collection Time: 04/08/19 12:20 AM  
Result Value Ref Range SAMPLES BEING HELD Add-on orders for these samples will be processed based on acceptable specimen integrity and analyte stability, which may vary by analyte. COMMENT Add-on orders for these samples will be processed based on acceptable specimen integrity and analyte stability, which may vary by analyte. CBC WITH AUTOMATED DIFF Collection Time: 04/08/19 12:20 AM  
Result Value Ref Range WBC 7.3 3.6 - 11.0 K/uL  
 RBC 4.46 3.80 - 5.20 M/uL  
 HGB 13.5 11.5 - 16.0 g/dL HCT 41.0 35.0 - 47.0 % MCV 91.9 80.0 - 99.0 FL  
 MCH 30.3 26.0 - 34.0 PG  
 MCHC 32.9 30.0 - 36.5 g/dL  
 RDW 12.7 11.5 - 14.5 % PLATELET 998 006 - 871 K/uL MPV 8.6 (L) 8.9 - 12.9 FL  
 NRBC 0.0 0  WBC ABSOLUTE NRBC 0.00 0.00 - 0.01 K/uL NEUTROPHILS 51 32 - 75 % LYMPHOCYTES 33 12 - 49 % MONOCYTES 13 5 - 13 % EOSINOPHILS 2 0 - 7 % BASOPHILS 1 0 - 1 % IMMATURE GRANULOCYTES 0 0.0 - 0.5 % ABS. NEUTROPHILS 3.7 1.8 - 8.0 K/UL  
 ABS. LYMPHOCYTES 2.4 0.8 - 3.5 K/UL  
 ABS. MONOCYTES 0.9 0.0 - 1.0 K/UL  
 ABS. EOSINOPHILS 0.2 0.0 - 0.4 K/UL  
 ABS. BASOPHILS 0.1 0.0 - 0.1 K/UL  
 ABS. IMM. GRANS. 0.0 0.00 - 0.04 K/UL  
 DF AUTOMATED METABOLIC PANEL, COMPREHENSIVE Collection Time: 04/08/19 12:20 AM  
Result Value Ref Range Sodium 138 136 - 145 mmol/L Potassium 3.9 3.5 - 5.1 mmol/L Chloride 101 97 - 108 mmol/L  
 CO2 31 21 - 32 mmol/L Anion gap 6 5 - 15 mmol/L Glucose 117 (H) 65 - 100 mg/dL BUN 24 (H) 6 - 20 MG/DL Creatinine 1.07 (H) 0.55 - 1.02 MG/DL  
 BUN/Creatinine ratio 22 (H) 12 - 20 GFR est AA >60 >60 ml/min/1.73m2 GFR est non-AA 50 (L) >60 ml/min/1.73m2 Calcium 9.3 8.5 - 10.1 MG/DL Bilirubin, total 0.4 0.2 - 1.0 MG/DL  
 ALT (SGPT) 18 12 - 78 U/L  
 AST (SGOT) 15 15 - 37 U/L Alk. phosphatase 67 45 - 117 U/L Protein, total 7.3 6.4 - 8.2 g/dL Albumin 3.5 3.5 - 5.0 g/dL Globulin 3.8 2.0 - 4.0 g/dL A-G Ratio 0.9 (L) 1.1 - 2.2 NT-PRO BNP Collection Time: 04/08/19 12:20 AM  
Result Value Ref Range NT pro- (H) 0 - 450 PG/ML  
PTT Collection Time: 04/08/19 12:20 AM  
Result Value Ref Range aPTT 24.3 22.1 - 32.0 sec  
 aPTT, therapeutic range     58.0 - 77.0 SECS  
 
 
CC time 40 mins.

## 2019-04-08 NOTE — INTERDISCIPLINARY ROUNDS
IDR/SLIDR Summary Patient: Lamar Ivy MRN: [id number]    Age: 70 y.o. YOB: 1940 Room/Bed: [address] [place] [address] Admit Diagnosis: Acute chest pain [R07.9] LBBB (left bundle branch block) [I44.7] Uncontrolled hypertension [I10] Chest pain [R07.9]  Principal Diagnosis: <principal problem not specified>  
Goals: No chest pain, Adjust meds, Discharge Readmission: NO  Quality Measure: AMI 
VTE Prophylaxis: Chemical 
Influenza Vaccine screening completed? YES Pneumococcal Vaccine screening completed? YES Mobility needs: No   Nutrition plan:Yes 
Consults:Case Management Financial concerns:No  Escalated to CM? NO 
RRAT Score: 10   Interventions: 
Testing due for pt today? NO 
LOS: 0 days Expected length of stay 2 days Discharge plan: Home when stable  PCP: Verner Keys, MD 
Transportation needs: No   
Days before discharge:one day until discharge Discharge disposition: Home Signed:  
 
Sammi Phillips RN 
4/8/2019 
5:22 AM

## 2019-04-08 NOTE — PROGRESS NOTES
1930: Bedside shift report received from [facility]. 0200: AM labs drawn. 0530: Tylenol given for headache. 
0700: EKG obtained. 0730: Bedside and Verbal shift change report given to [facility] (oncoming nurse) by Jeovanny Easley (offgoing nurse). Report included the following information SBAR, Kardex, Procedure Summary, Intake/Output, MAR, Accordion and Recent Results.

## 2019-04-08 NOTE — ED TRIAGE NOTES
Pt arrives via EMS with complaints of Mid sternal chest pain that  radiates down her left arm x 2 hours. Pt states she lifted a heavy bag of bird seed approx 40 lb earlier today with sudden chest pain that quickly subsided. pain came back around [phone] when pt sat down.

## 2019-04-08 NOTE — PROCEDURES
[address] CATH    Name:  Dennis Peña  MR#:  [id number]  :  1940  ACCOUNT #:  [de-identified]  DATE OF SERVICE:  2019    PROCEDURES PERFORMED:  1. Coronary angiography. 2.  Left heart catheterization. 3.  Left ventriculography. 4.  Right common femoral artery angiography. PREOPERATIVE DIAGNOSES:  Suspected Anterior STEMI(CP and LBBB)    POSTOPERATIVE DIAGNOSES:  Takotsubo cardiomyopathy    SURGEON:  Ortega Colón MD    ASSISTANT:  None. ESTIMATED BLOOD LOSS:  5 mL    SPECIMENS REMOVED:  None. COMPLICATIONS:  None apparent. IMPLANTS:  None    ANESTHESIA:  Sedation was administered by Cath Lab nurse and I supervised her as she monitored the patient throughout the case. Initial sedation was at 3:13 a.m. and case end is currently pending at the time of this dictation. CATHETERS:  6-Yakut arterial sheath, 5-Yakut JL-4, 5-Yakut JR-4, 5-Yakut pigtail. MEDICATIONS:  Versed 2 mg IV, fentanyl 25 mcg IV. TOTAL FLUORO TIME:  3.02 minutes. TOTAL CONTRAST:  105 mL    CLINICAL:  A 82-year-old female with past medical history of paroxysmal atrial fibrillation, on flecainide, followed by Dr. Fern Schirmer, who presented with acute chest pain after lifting birdseed. She was found to have a new left bundle-branch block and an anterior STEMI was declared. She was transferred here for emergent cardiac catheterization. TECHNIQUE:  Percutaneous right femoral artery. HEMODYNAMICS:  Left ventricular pressure 127/12, aortic pressure at 135/67. DESCRIPTION:  Coronary Angiography:    Left main trunk:  Left main is patent with 20% ostial disease. Left anterior descending: The LAD is a large vessel with double-barrel morphology in the mid distal aspect with a large second diagonal.  There is 30% proximal and 30% mid disease in the LAD proper.   Mild luminal irregularities noted in the branching second diagonal.    Circumflex artery:  The circumflex is small and nondominant, courses primarily as a medium-size first obtuse marginal with 30% proximal disease. Right coronary artery:  The RCA is large and dominant with 40% mid and 30% distal disease as well as 30% right posterolateral branch disease. Single-plane JOHNSON projection ventriculogram demonstrates severely reduced systolic function with anteroapical akinesis, EF of 25%-30%. No significant mitral regurgitation noted. Right common femoral artery angiography demonstrates high bifurcation with sheaths entering at or just above the bifurcation. Manual sheath pull strategy was employed. CONCLUSIONS:  1.  Mild nonobstructive coronary artery disease. 2.  Severely reduced systolic function with regional wall motion as above, likely consistent with Takotsubo cardiomyopathy. 3.  Manual sheath pull strategy for the right femoral artery.         Juan Antonio Gonzalez MD      [id number]  D:  04/08/2019 3:46  T:  04/08/2019 4:15  JOB #:  [id number]  CC:  Leroy Eckert MD

## 2019-04-09 VITALS
TEMPERATURE: 98 F | WEIGHT: 208.3 LBS | DIASTOLIC BLOOD PRESSURE: 75 MMHG | SYSTOLIC BLOOD PRESSURE: 139 MMHG | RESPIRATION RATE: 16 BRPM | HEART RATE: 66 BPM | HEIGHT: 67 IN | OXYGEN SATURATION: 96 % | BODY MASS INDEX: 32.69 KG/M2

## 2019-04-09 LAB
ANION GAP SERPL CALC-SCNC: 7 MMOL/L (ref 5–15)
ATRIAL RATE: 66 BPM
BUN SERPL-MCNC: 12 MG/DL (ref 6–20)
BUN/CREAT SERPL: 16 (ref 12–20)
CALCIUM SERPL-MCNC: 9.1 MG/DL (ref 8.5–10.1)
CALCULATED P AXIS, ECG09: 51 DEGREES
CALCULATED R AXIS, ECG10: -16 DEGREES
CALCULATED T AXIS, ECG11: -154 DEGREES
CHLORIDE SERPL-SCNC: 100 MMOL/L (ref 97–108)
CO2 SERPL-SCNC: 26 MMOL/L (ref 21–32)
CREAT SERPL-MCNC: 0.76 MG/DL (ref 0.55–1.02)
DIAGNOSIS, 93000: NORMAL
ERYTHROCYTE [DISTWIDTH] IN BLOOD BY AUTOMATED COUNT: 12.6 % (ref 11.5–14.5)
GLUCOSE SERPL-MCNC: 102 MG/DL (ref 65–100)
HCT VFR BLD AUTO: 42.1 % (ref 35–47)
HGB BLD-MCNC: 13.7 G/DL (ref 11.5–16)
MCH RBC QN AUTO: 30.1 PG (ref 26–34)
MCHC RBC AUTO-ENTMCNC: 32.5 G/DL (ref 30–36.5)
MCV RBC AUTO: 92.5 FL (ref 80–99)
NRBC # BLD: 0 K/UL (ref 0–0.01)
NRBC BLD-RTO: 0 PER 100 WBC
P-R INTERVAL, ECG05: 180 MS
PLATELET # BLD AUTO: 231 K/UL (ref 150–400)
PMV BLD AUTO: 8.7 FL (ref 8.9–12.9)
POTASSIUM SERPL-SCNC: 3.8 MMOL/L (ref 3.5–5.1)
Q-T INTERVAL, ECG07: 504 MS
QRS DURATION, ECG06: 102 MS
QTC CALCULATION (BEZET), ECG08: 528 MS
RBC # BLD AUTO: 4.55 M/UL (ref 3.8–5.2)
SODIUM SERPL-SCNC: 133 MMOL/L (ref 136–145)
VENTRICULAR RATE, ECG03: 66 BPM
WBC # BLD AUTO: 8 K/UL (ref 3.6–11)

## 2019-04-09 PROCEDURE — 80048 BASIC METABOLIC PNL TOTAL CA: CPT

## 2019-04-09 PROCEDURE — 85027 COMPLETE CBC AUTOMATED: CPT

## 2019-04-09 PROCEDURE — 74011250636 HC RX REV CODE- 250/636: Performed by: INTERNAL MEDICINE

## 2019-04-09 PROCEDURE — 93005 ELECTROCARDIOGRAM TRACING: CPT

## 2019-04-09 PROCEDURE — 36415 COLL VENOUS BLD VENIPUNCTURE: CPT

## 2019-04-09 PROCEDURE — 74011250637 HC RX REV CODE- 250/637: Performed by: INTERNAL MEDICINE

## 2019-04-09 RX ORDER — LISINOPRIL 5 MG/1
5 TABLET ORAL DAILY
Qty: 30 TAB | Refills: 6 | Status: ON HOLD | OUTPATIENT
Start: 2019-04-10 | End: 2021-12-19

## 2019-04-09 RX ORDER — CARVEDILOL 3.12 MG/1
3.12 TABLET ORAL 2 TIMES DAILY WITH MEALS
Qty: 60 TAB | Refills: 6 | Status: SHIPPED | OUTPATIENT
Start: 2019-04-09 | End: 2021-12-16

## 2019-04-09 RX ORDER — LISINOPRIL 5 MG/1
5 TABLET ORAL DAILY
Qty: 30 TAB | Refills: 6 | Status: SHIPPED | OUTPATIENT
Start: 2019-04-10 | End: 2019-04-09

## 2019-04-09 RX ORDER — CARVEDILOL 3.12 MG/1
3.12 TABLET ORAL 2 TIMES DAILY WITH MEALS
Qty: 60 TAB | Refills: 6 | Status: SHIPPED | OUTPATIENT
Start: 2019-04-09 | End: 2019-04-09

## 2019-04-09 RX ADMIN — LISINOPRIL 5 MG: 5 TABLET ORAL at 08:23

## 2019-04-09 RX ADMIN — PRAVASTATIN SODIUM 20 MG: 20 TABLET ORAL at 08:23

## 2019-04-09 RX ADMIN — ACETAMINOPHEN 650 MG: 325 TABLET ORAL at 05:20

## 2019-04-09 RX ADMIN — ENOXAPARIN SODIUM 40 MG: 40 INJECTION SUBCUTANEOUS at 05:20

## 2019-04-09 RX ADMIN — Medication 10 ML: at 05:23

## 2019-04-09 RX ADMIN — LEVOTHYROXINE SODIUM 75 MCG: 75 TABLET ORAL at 06:55

## 2019-04-09 RX ADMIN — CARVEDILOL 3.12 MG: 3.12 TABLET, FILM COATED ORAL at 08:23

## 2019-04-09 RX ADMIN — LORATADINE 10 MG: 10 TABLET ORAL at 08:23

## 2019-04-09 NOTE — DISCHARGE SUMMARY
Discharge Summary     Patient: Edmund Hand MRN: [id number]  SSN: [id number]    YOB: 1940  Age: 70 y.o. Sex: female       Admit Date: 4/7/2019    Discharge Date: 4/9/2019      Admission Diagnoses: Acute chest pain [R07.9]  LBBB (left bundle branch block) [I44.7]  Uncontrolled hypertension [I10]  Chest pain [R07.9]    Discharge Diagnoses:   Problem List as of 4/9/2019 Date Reviewed: 7/6/2015          Codes Class Noted - Resolved    LBBB (left bundle branch block) ICD-10-CM: I44.7  ICD-9-CM: 426.3  4/8/2019 - Present        Acute chest pain ICD-10-CM: R07.9  ICD-9-CM: 786.50  4/8/2019 - Present        Uncontrolled hypertension ICD-10-CM: I10  ICD-9-CM: 401.9  4/8/2019 - Present        Chest pain ICD-10-CM: R07.9  ICD-9-CM: 786.50  4/8/2019 - Present               Discharge Condition: Good    Hospital Course: Admitted with chest pain. ECG with new LBBB. Taken to cath lab. NO CAD. EF 30-35%. Started med rx. Cath suggestive of takotsubo cardiomyopathy. Symptoms improved. Consults: n/a    Significant Diagnostic Studies: cath - no cad. Disposition: home    Discharge Medications:   Current Discharge Medication List      START taking these medications    Details   carvedilol (COREG) 3.125 mg tablet Take 1 Tab by mouth two (2) times daily (with meals). Indications: chronic heart failure  Qty: 60 Tab, Refills: 6      lisinopril (PRINIVIL, ZESTRIL) 5 mg tablet Take 1 Tab by mouth daily. Qty: 30 Tab, Refills: 6         CONTINUE these medications which have NOT CHANGED    Details   levothyroxine (SYNTHROID) 75 mcg tablet Take 75 mcg by mouth Daily (before breakfast). loratadine (CLARITIN) 10 mg tablet Take 10 mg by mouth daily. pravastatin (PRAVACHOL) 20 mg tablet Take 20 mg by mouth daily. co-enzyme Q-10 (COQ-10) 100 mg capsule Take 100 mg by mouth daily. bumetanide (BUMEX) 1 mg tablet Take 1 mg by mouth daily.          STOP taking these medications       flecainide (TAMBOCOR) 100 mg tablet Comments:   Reason for Stopping:         ASPIRIN PO Comments:   Reason for Stopping:         verapamil 240 mg CR tablet Comments:   Reason for Stopping:               Activity: No lifting, Driving, or Strenuous exercise for 1 week.   Diet: Cardiac Diet  Wound Care: None needed    Follow-up Appointments   Procedures    FOLLOW UP VISIT Appointment in: One Month     Standing Status:   Standing     Number of Occurrences:   1     Order Specific Question:   Appointment in     Answer:   One Month       Signed By: Sourav Louis MD     April 9, 2019

## 2019-04-09 NOTE — ROUTINE PROCESS
08:00 SBAR from [place] 10:30 Up walking in ccu with assistance, states she feels well, hoping for DC. 
 
15:10 Dced with heart failure and scale to weigh herself. She denies nay complaints or questions. Prescriptions sent [facility] her [facility].

## 2019-04-09 NOTE — PROGRESS NOTES
Reason for Admission:   LBBB Left Bundle Branch RRAT Score: 10 Plan for utilizing home health:   TBD Current Advanced Directive/Advance Care Plan:  Not on file Likelihood of Readmission:  Low Transition of Care Plan:  CM met with patient to discuss discharge planning. She lives with her  in their private residence. She is independent without any assistive devices. Patient sees her PCP as needed and uses her local [facility] pharmacy for prescriptions. Her  or daughter will provide transportation home. She did not voice any discharge barriers. CM will continue to follow. Shane Warner MSA, RN, CRM. Care Management Interventions PCP Verified by CM: Yes 
Palliative Care Criteria Met (RRAT>21 & CHF Dx)?: No 
Mode of Transport at Discharge: Other (see comment) Transition of Care Consult (CM Consult): Discharge Planning Physical Therapy Consult: No 
Occupational Therapy Consult: No 
Speech Therapy Consult: No 
Current Support Network: Lives with Spouse Confirm Follow Up Transport: Family Plan discussed with Pt/Family/Caregiver: Yes Discharge Location Discharge Placement: Home with family assistance

## 2019-04-09 NOTE — INTERDISCIPLINARY ROUNDS
IDR/SLIDR Summary Patient: Carlos Borges MRN: [id number]    Age: 70 y.o. YOB: 1940 Room/Bed: [address] [place] [address] Admit Diagnosis: Acute chest pain [R07.9] LBBB (left bundle branch block) [I44.7] Uncontrolled hypertension [I10] Chest pain [R07.9]  Principal Diagnosis: <principal problem not specified>  
Goals: No chest pain, Adjust meds, Discharge Readmission: NO  Quality Measure: AMI 
VTE Prophylaxis: Chemical 
Influenza Vaccine screening completed? YES Pneumococcal Vaccine screening completed? YES Mobility needs: No   Nutrition plan:Yes 
Consults:Case Management Financial concerns:No  Escalated to CM? NO 
RRAT Score: 10   Interventions: 
Testing due for pt today? NO 
LOS: 1 days Expected length of stay 2 days Discharge plan: Home when stable  PCP: Deepa Dela Cruz MD 
Transportation needs: No   
Days before discharge:one day until discharge Discharge disposition: Home Signed:  
 
Liborio Hsieh 4/9/2019 
5:22 AM

## 2019-04-09 NOTE — DISCHARGE INSTRUCTIONS
Patient Education        Learning About ACE Inhibitors  Introduction    ACE (angiotensin-converting enzyme) inhibitors stop the release of an enzyme. This enzyme makes your blood vessels smaller. Without it, your blood vessels relax and get bigger. This lowers your blood pressure. These medicines also increase how much water and salt go into your urine. This also lowers blood pressure. You may take this kind of medicine if you have high blood pressure. Or you may take it if you have heart problems, kidney problems, or diabetes. If you have coronary artery disease, this medicine can help prevent heart attacks and strokes. Examples  · Benazepril (Lotensin)  · Lisinopril (Prinivil, Zestril)  · Ramipril (Altace)  This is not a complete list.  Possible side effects  Side effects may include:  · A cough. · Low blood pressure. This can make you feel dizzy or weak. · Too much potassium in your body. · Swelling. This may be a sign of an allergic reaction. You may have other side effects or reactions not listed here. Check the information that comes with your medicine. What to know about taking this medicine  · ACE inhibitors can cause a dry cough. Talk to your doctor if you have a dry cough. You may need a different medicine. · These medicines can cause an allergic reaction. This can cause a little swelling. Or it can cause red bumps on your skin that hurt. In rare cases, the swelling may make it hard for you to breathe. · Do not take this medicine if you are pregnant. And don't take it if you plan to become pregnant. · Take your medicines exactly as prescribed. Call your doctor if you think you are having a problem with your medicine. · Check with your doctor or pharmacist before you use any other medicines. This includes over-the-counter medicines. Make sure your doctor knows all of the medicines, vitamins, herbal products, and supplements you take. Taking some medicines together can cause problems.   · You may need regular blood tests. Where can you learn more? Go to http://marialuisa-samantha.info/. Enter P050 in the search box to learn more about \"Learning About ACE Inhibitors. \"  Current as of: July 22, 2018  Content Version: 11.9  © 9764-3836 [employer]. Care instructions adapted under license by [employer] (which disclaims liability or warranty for this information). If you have questions about a medical condition or this instruction, always ask your healthcare professional. [place] [address] any warranty or liability for your use of this information. Patient Education        Chest Pain: Care Instructions  Your Care Instructions    There are many things that can cause chest pain. Some are not serious and will get better on their own in a few days. But some kinds of chest pain need more testing and treatment. Your doctor may have recommended a follow-up visit in the next 8 to 12 hours. If you are not getting better, you may need more tests or treatment. Even though your doctor has released you, you still need to watch for any problems. The doctor carefully checked you, but sometimes problems can develop later. If you have new symptoms or if your symptoms do not get better, get medical care right away. If you have worse or different chest pain or pressure that lasts more than 5 minutes or you passed out (lost consciousness), call 911 or seek other emergency help right away. A medical visit is only one step in your treatment. Even if you feel better, you still need to do what your doctor recommends, such as going to all suggested follow-up appointments and taking medicines exactly as directed. This will help you recover and help prevent future problems. How can you care for yourself at home? · Rest until you feel better. · Take your medicine exactly as prescribed. Call your doctor if you think you are having a problem with your medicine.   · Do not drive after taking a prescription pain medicine. When should you call for help? Call 911 if:    · You passed out (lost consciousness).     · You have severe difficulty breathing.     · You have symptoms of a heart attack. These may include:  ? Chest pain or pressure, or a strange feeling in your chest.  ? Sweating. ? Shortness of breath. ? Nausea or vomiting. ? Pain, pressure, or a strange feeling in your back, neck, jaw, or upper belly or in one or both shoulders or arms. ? Lightheadedness or sudden weakness. ? A fast or irregular heartbeat. After you call 911, the  may tell you to chew 1 adult-strength or 2 to 4 low-dose aspirin. Wait for an ambulance. Do not try to drive yourself.    Call your doctor today if:    · You have any trouble breathing.     · Your chest pain gets worse.     · You are dizzy or lightheaded, or you feel like you may faint.     · You are not getting better as expected.     · You are having new or different chest pain. Where can you learn more? Go to http://marialuisa-samantha.info/. Enter A120 in the search box to learn more about \"Chest Pain: Care Instructions. \"  Current as of: September 23, 2018  Content Version: 11.9  © 2434-1098 [employer]. Care instructions adapted under license by [employer] (which disclaims liability or warranty for this information). If you have questions about a medical condition or this instruction, always ask your healthcare professional. [place] [address] any warranty or liability for your use of this information. Patient Education                       Cardiac Catheterization/Angiography Discharge Instructions    *Check the puncture site frequently for swelling or bleeding. If you see any bleeding, lie down and apply pressure over the area with a clean town or washcloth. Notify your doctor for any redness, swelling, drainage or oozing from the puncture site.  Notify your doctor for any fever or chills. *If the leg or arm with the puncture becomes cold, numb or painful, call Dr Gilberto Bowen at  Gilberto [facility]en    *Activity should be limited for the next 48 hours. Climb stairs as little as possible and avoid any stooping, bending or strenuous activity for 48 hours. No heavy lifting (anything over 10 pounds) for three days. *Do not drive for 48 hours. *You may resume your usual diet. Drink more fluids than usual.    *Have a responsible person drive you home and stay with you for at least 24 hours after your heart catheterization/angiography. *You may remove the bandage from your {ARM/GROIN:88994} in 24 hours. You may shower in 24 hours. No tub baths, hot tubs or swimming for one week. Do not place any lotions, creams, powders, ointments over the puncture site for one week. You may place a clean band-aid over the puncture site each day for 5 days. Change this daily. Heart Failure: Care Instructions  Your Care Instructions    Heart failure occurs when your heart does not pump as much blood as the body needs. Failure does not mean that the heart has stopped pumping but rather that it is not pumping as well as it should. Over time, this causes fluid buildup in your lungs and other parts of your body. Fluid buildup can cause shortness of breath, fatigue, swollen ankles, and other problems. By taking medicines regularly, reducing sodium (salt) in your diet, checking your weight every day, and making lifestyle changes, you can feel better and live longer. Follow-up care is a key part of your treatment and safety. Be sure to make and go to all appointments, and call your doctor if you are having problems. It's also a good idea to know your test results and keep a list of the medicines you take. How can you care for yourself at home? Medicines    · Be safe with medicines. Take your medicines exactly as prescribed.  Call your doctor if you think you are having a problem with your medicine.     · Do not take any vitamins, over-the-counter medicine, or herbal products without talking to your doctor first. Sri Ferrer not take ibuprofen (Advil or Motrin) and naproxen (Aleve) without talking to your doctor first. They could make your heart failure worse.     · You may be taking some of the following medicine. ? Beta-blockers can slow heart rate, decrease blood pressure, and improve your condition. Taking a beta-blocker may lower your chance of needing to be hospitalized. ? Angiotensin-converting enzyme inhibitors (ACEIs) reduce the heart's workload, lower blood pressure, and reduce swelling. Taking an ACEI may lower your chance of needing to be hospitalized again. ? Angiotensin II receptor blockers (ARBs) work like ACEIs. Your doctor may prescribe them instead of ACEIs. ? Diuretics, also called water pills, reduce swelling. ? Potassium supplements replace this important mineral, which is sometimes lost with diuretics. ? Aspirin and other blood thinners prevent blood clots, which can cause a stroke or heart attack.    You will get more details on the specific medicines your doctor prescribes. Diet    · Your doctor may suggest that you limit sodium to 2,000 milligrams (mg) a day or less. That is less than 1 teaspoon of salt a day, including all the salt you eat in cooking or in packaged foods. People get most of their sodium from processed foods. Fast food and restaurant meals also tend to be very high in sodium.     · Ask your doctor how much liquid you can drink each day. You may have to limit liquids.    Weight    · Weigh yourself without clothing at the same time each day. Record your weight. Call your doctor if you have a sudden weight gain, such as more than 2 to 3 pounds in a day or 5 pounds in a week. (Your doctor may suggest a different range of weight gain.) A sudden weight gain may mean that your heart failure is getting worse.    Activity level    · Start light exercise (if your doctor says it is okay).  Even if you can only do a small amount, exercise will help you get stronger, have more energy, and manage your weight and your stress. Walking is an easy way to get exercise. Start out by walking a little more than you did before. Bit by bit, increase the amount you walk.     · When you exercise, watch for signs that your heart is working too hard. You are pushing yourself too hard if you cannot talk while you are exercising. If you become short of breath or dizzy or have chest pain, stop, sit down, and rest.     · If you feel \"wiped out\" the day after you exercise, walk slower or for a shorter distance until you can work up to a better pace.     · Get enough rest at night. Sleeping with 1 or 2 pillows under your upper body and head may help you breathe easier.    Lifestyle changes    · Do not smoke. Smoking can make a heart condition worse. If you need help quitting, talk to your doctor about stop-smoking programs and medicines. These can increase your chances of quitting for good. Quitting smoking may be the most important step you can take to protect your heart.     · Limit alcohol to 2 drinks a day for men and 1 drink a day for women. Too much alcohol can cause health problems.     · Avoid getting sick from colds and the flu. Get a pneumococcal vaccine shot. If you have had one before, ask your doctor whether you need another dose. Get a flu shot each year. If you must be around people with colds or the flu, wash your hands often. When should you call for help?   Call 911 if you have symptoms of sudden heart failure such as:    · You have severe trouble breathing.     · You cough up pink, foamy mucus.     · You have a new irregular or rapid heartbeat.    Call your doctor now or seek immediate medical care if:    · You have new or increased shortness of breath.     · You are dizzy or lightheaded, or you feel like you may faint.     · You have sudden weight gain, such as more than 2 to 3 pounds in a day or 5 pounds in a week. (Your doctor may suggest a different range of weight gain.)     · You have increased swelling in your legs, ankles, or feet.     · You are suddenly so tired or weak that you cannot do your usual activities.    Watch closely for changes in your health, and be sure to contact your doctor if you develop new symptoms. Where can you learn more? Go to http://marialuisa-samantha.info/. Enter U477 in the search box to learn more about \"Heart Failure: Care Instructions. \"  Current as of: July 22, 2018  Content Version: 11.9  © 0482-3348 [employer]. Care instructions adapted under license by [employer] (which disclaims liability or warranty for this information). If you have questions about a medical condition or this instruction, always ask your healthcare professional. [address] any warranty or liability for your use of this information. Cardiac Catheterization/Angiography Discharge Instructions    *Check the puncture site frequently for swelling or bleeding. If you see any bleeding, lie down and apply pressure over the area with a clean town or washcloth. Notify your doctor for any redness, swelling, drainage or oozing from the puncture site. Notify your doctor for any fever or chills. *If the leg or arm with the puncture becomes cold, numb or painful, call Dr Felix Woodward at  [facility]    *Activity should be limited for the next 48 hours. Climb stairs as little as possible and avoid any stooping, bending or strenuous activity for 48 hours. No heavy lifting (anything over 10 pounds) for three days. *Do not drive for 48 hours. *You may resume your usual diet. Drink more fluids than usual.    *Have a responsible person drive you home and stay with you for at least 24 hours after your heart catheterization/angiography. *You may remove the bandage from your {ARM/GROIN:87391} in 24 hours. You may shower in 24 hours.  No tub baths, hot tubs or swimming for one week. Do not place any lotions, creams, powders, ointments over the puncture site for one week. You may place a clean band-aid over the puncture site each day for 5 days. Change this daily.

## 2019-04-09 NOTE — PROGRESS NOTES
Provided pastoral care visit to [facility] 5 patient. Did not include sacramental care. Gagandeep Devine

## 2019-04-09 NOTE — PROGRESS NOTES
Hospital follow-up PCP transitional care appointment has been scheduled with Dr. Leia Diaz for Monday, 4/15/19 at 10:15 a.m. Pending patient discharge.   Mendoza Wilkerson, Care Management Specialist.

## 2019-04-09 NOTE — INTERDISCIPLINARY ROUNDS
IDR/SLIDR Summary Patient: Thao Sagastume MRN: [id number]    Age: 70 y.o. YOB: 1940 Room/Bed: [address] [place] [address] Admit Diagnosis: Acute chest pain [R07.9] LBBB (left bundle branch block) [I44.7] Uncontrolled hypertension [I10] Chest pain [R07.9]  Principal Diagnosis: <principal problem not specified>  
Goals: No chest pain, Adjust meds, Discharge Readmission: NO  Quality Measure: AMI 
VTE Prophylaxis: Chemical 
Influenza Vaccine screening completed? YES Pneumococcal Vaccine screening completed? YES Mobility needs: No   Nutrition plan:Yes 
Consults:Case Management Financial concerns:No  Escalated to CM? NO 
RRAT Score: 10   Interventions: 
Testing due for pt today? NO 
LOS: 1 days Expected length of stay 2 days Discharge plan: Home when stable  PCP: Yi Valle MD 
Transportation needs: No   
Days before discharge:one day until discharge Discharge disposition: Home Signed:  
 
Cassius Marlow 4/9/2019 
5:22 AM

## 2019-04-09 NOTE — PROGRESS NOTES
1930: Bedside shift report received from [facility].. 0200: AM labs drawn. 0730: Bedside and Verbal shift change report given to [facility]. (oncoming nurse) by Ema Diaz (offgoing nurse). Report included the following information SBAR, Kardex, Procedure Summary, Intake/Output, MAR, Accordion and Recent Results.

## 2019-07-01 ENCOUNTER — HOSPITAL ENCOUNTER (OUTPATIENT)
Dept: MAMMOGRAPHY | Age: 79
Discharge: HOME | End: 2019-07-01
Attending: FAMILY MEDICINE
Payer: MEDICARE

## 2019-07-01 DIAGNOSIS — Z12.39 BREAST SCREENING, UNSPECIFIED: ICD-10-CM

## 2019-07-01 PROCEDURE — 77067 SCR MAMMO BI INCL CAD: CPT

## 2020-01-20 ENCOUNTER — HOSPITAL ENCOUNTER (EMERGENCY)
Age: 80
Discharge: HOME | End: 2020-01-20
Attending: EMERGENCY MEDICINE
Payer: MEDICARE

## 2020-01-20 ENCOUNTER — APPOINTMENT (OUTPATIENT)
Dept: GENERAL RADIOLOGY | Age: 80
End: 2020-01-20
Attending: EMERGENCY MEDICINE
Payer: MEDICARE

## 2020-01-20 VITALS
OXYGEN SATURATION: 95 % | HEART RATE: 61 BPM | TEMPERATURE: 98.3 F | RESPIRATION RATE: 16 BRPM | DIASTOLIC BLOOD PRESSURE: 88 MMHG | SYSTOLIC BLOOD PRESSURE: 146 MMHG

## 2020-01-20 DIAGNOSIS — I48.0 PAROXYSMAL ATRIAL FIBRILLATION WITH RAPID VENTRICULAR RESPONSE (HCC): Primary | ICD-10-CM

## 2020-01-20 LAB
ALBUMIN SERPL-MCNC: 4.1 G/DL (ref 3.5–5)
ALBUMIN/GLOB SERPL: 1 {RATIO} (ref 1.1–2.2)
ALP SERPL-CCNC: 93 U/L (ref 45–117)
ALT SERPL-CCNC: 28 U/L (ref 12–78)
ANION GAP SERPL CALC-SCNC: 8 MMOL/L (ref 5–15)
AST SERPL-CCNC: 23 U/L (ref 15–37)
ATRIAL RATE: 174 BPM
BASOPHILS # BLD: 0.1 K/UL (ref 0–0.1)
BASOPHILS NFR BLD: 1 % (ref 0–1)
BILIRUB SERPL-MCNC: 0.6 MG/DL (ref 0.2–1)
BNP SERPL-MCNC: 390 PG/ML (ref 0–450)
BUN SERPL-MCNC: 15 MG/DL (ref 6–20)
BUN/CREAT SERPL: 17 (ref 12–20)
CALCIUM SERPL-MCNC: 9.1 MG/DL (ref 8.5–10.1)
CALCULATED R AXIS, ECG10: -15 DEGREES
CALCULATED T AXIS, ECG11: 118 DEGREES
CHLORIDE SERPL-SCNC: 99 MMOL/L (ref 97–108)
CO2 SERPL-SCNC: 30 MMOL/L (ref 21–32)
COMMENT, HOLDF: NORMAL
CREAT SERPL-MCNC: 0.88 MG/DL (ref 0.55–1.02)
DIAGNOSIS, 93000: NORMAL
DIFFERENTIAL METHOD BLD: ABNORMAL
EOSINOPHIL # BLD: 0.2 K/UL (ref 0–0.4)
EOSINOPHIL NFR BLD: 3 % (ref 0–7)
ERYTHROCYTE [DISTWIDTH] IN BLOOD BY AUTOMATED COUNT: 12.7 % (ref 11.5–14.5)
GLOBULIN SER CALC-MCNC: 4.1 G/DL (ref 2–4)
GLUCOSE SERPL-MCNC: 118 MG/DL (ref 65–100)
HCT VFR BLD AUTO: 43 % (ref 35–47)
HGB BLD-MCNC: 13.9 G/DL (ref 11.5–16)
IMM GRANULOCYTES # BLD AUTO: 0 K/UL (ref 0–0.04)
IMM GRANULOCYTES NFR BLD AUTO: 0 % (ref 0–0.5)
LIPASE SERPL-CCNC: 93 U/L (ref 73–393)
LYMPHOCYTES # BLD: 2.2 K/UL (ref 0.8–3.5)
LYMPHOCYTES NFR BLD: 32 % (ref 12–49)
MCH RBC QN AUTO: 29.9 PG (ref 26–34)
MCHC RBC AUTO-ENTMCNC: 32.3 G/DL (ref 30–36.5)
MCV RBC AUTO: 92.5 FL (ref 80–99)
MONOCYTES # BLD: 0.9 K/UL (ref 0–1)
MONOCYTES NFR BLD: 13 % (ref 5–13)
NEUTS SEG # BLD: 3.6 K/UL (ref 1.8–8)
NEUTS SEG NFR BLD: 51 % (ref 32–75)
NRBC # BLD: 0 K/UL (ref 0–0.01)
NRBC BLD-RTO: 0 PER 100 WBC
PLATELET # BLD AUTO: 261 K/UL (ref 150–400)
PMV BLD AUTO: 8.7 FL (ref 8.9–12.9)
POTASSIUM SERPL-SCNC: 4.1 MMOL/L (ref 3.5–5.1)
PROT SERPL-MCNC: 8.2 G/DL (ref 6.4–8.2)
Q-T INTERVAL, ECG07: 360 MS
QRS DURATION, ECG06: 94 MS
QTC CALCULATION (BEZET), ECG08: 543 MS
RBC # BLD AUTO: 4.65 M/UL (ref 3.8–5.2)
SAMPLES BEING HELD,HOLD: NORMAL
SODIUM SERPL-SCNC: 137 MMOL/L (ref 136–145)
TROPONIN I SERPL-MCNC: <0.05 NG/ML
TROPONIN I SERPL-MCNC: <0.05 NG/ML
TSH SERPL DL<=0.05 MIU/L-ACNC: 1.48 UIU/ML (ref 0.36–3.74)
VENTRICULAR RATE, ECG03: 137 BPM
WBC # BLD AUTO: 7 K/UL (ref 3.6–11)

## 2020-01-20 PROCEDURE — 36415 COLL VENOUS BLD VENIPUNCTURE: CPT

## 2020-01-20 PROCEDURE — 71046 X-RAY EXAM CHEST 2 VIEWS: CPT

## 2020-01-20 PROCEDURE — 80053 COMPREHEN METABOLIC PANEL: CPT

## 2020-01-20 PROCEDURE — 93005 ELECTROCARDIOGRAM TRACING: CPT

## 2020-01-20 PROCEDURE — 84484 ASSAY OF TROPONIN QUANT: CPT

## 2020-01-20 PROCEDURE — 99285 EMERGENCY DEPT VISIT HI MDM: CPT

## 2020-01-20 PROCEDURE — 84443 ASSAY THYROID STIM HORMONE: CPT

## 2020-01-20 PROCEDURE — 83880 ASSAY OF NATRIURETIC PEPTIDE: CPT

## 2020-01-20 PROCEDURE — 83690 ASSAY OF LIPASE: CPT

## 2020-01-20 PROCEDURE — 85025 COMPLETE CBC W/AUTO DIFF WBC: CPT

## 2020-01-20 RX ORDER — ATORVASTATIN CALCIUM 40 MG/1
40 TABLET, FILM COATED ORAL DAILY
COMMUNITY

## 2020-01-20 RX ORDER — METOPROLOL TARTRATE 50 MG/1
50 TABLET ORAL EVERY EVENING
COMMUNITY
End: 2021-12-29

## 2020-01-20 RX ORDER — RAMIPRIL 1.25 MG/1
CAPSULE ORAL DAILY
COMMUNITY
End: 2021-12-16

## 2020-01-20 NOTE — ED NOTES
Patient noted to have converted from AFIB RVR to NSR with rate of 82 on own while MD and nurse present in room.

## 2020-01-20 NOTE — ED TRIAGE NOTES
Reports awakening at 3 with palpitations. States that she checked her bp which read high (900'W systolic). States that she began with constant non radiating, mid sternal chest discomfort. Denies SOB, vomiting.

## 2020-01-20 NOTE — ED PROVIDER NOTES
68-year-old female with a history of paroxysmal atrial fibrillation, HTN, HLD, GERD, hypothyroid, with admission last year for chest pain and elevated troponin with cardiac cath findings suggestive of Takotsubo, but otherwise clean coronaries presents to the emergency department noting the onset of palpitations and substernal chest tightness that was nonradiating started around 3 AM and has been persistent since. She reportedly checked her blood pressure and found it to be elevated with systolic blood pressures in the 170s. She denies any associated shortness of breath, nausea, vomiting, lightheadedness, syncope, pleurisy, leg swelling. She notes a significant history of paroxysmal atrial fibrillation and states that many years prior they attempted an ablation but were unsuccessful. She denies any history of prior cardioversion. Denies any change in her medication regimen. She denies any recent illness, fever, chills, dysuria or any other medical concerns.            Past Medical History:   Diagnosis Date    Arrhythmia     attempted ablation    Arthritis     GERD (gastroesophageal reflux disease)     High cholesterol     History of seasonal allergies     Hypertension     Thyroid disease        Past Surgical History:   Procedure Laterality Date    BREAST SURGERY PROCEDURE UNLISTED      breast biopsy-local - benign    CARDIAC SURG PROCEDURE UNLIST      attempted ablation    HX BREAST BIOPSY Right Years ago    Negative    HX CATARACT REMOVAL      bilateral    HX OTHER SURGICAL      cyst removed from left cheek         Family History:   Problem Relation Age of Onset    Other Mother         multiple myeloma    Stroke Father     Heart Disease Brother         ablation       Social History     Socioeconomic History    Marital status:      Spouse name: Not on file    Number of children: Not on file    Years of education: Not on file    Highest education level: Not on file   Occupational History  Not on file   Social Needs    Financial resource strain: Not on file    Food insecurity:     Worry: Not on file     Inability: Not on file    Transportation needs:     Medical: Not on file     Non-medical: Not on file   Tobacco Use    Smoking status: Former Smoker     Years: 10.00     Last attempt to quit: 1974     Years since quittin.6    Smokeless tobacco: Never Used   Substance and Sexual Activity    Alcohol use: Yes     Alcohol/week: 0.0 standard drinks     Types: 3 - 4 Glasses of wine per week    Drug use: No    Sexual activity: Not on file   Lifestyle    Physical activity:     Days per week: Not on file     Minutes per session: Not on file    Stress: Not on file   Relationships    Social connections:     Talks on phone: Not on file     Gets together: Not on file     Attends Islam service: Not on file     Active member of club or organization: Not on file     Attends meetings of clubs or organizations: Not on file     Relationship status: Not on file    Intimate partner violence:     Fear of current or ex partner: Not on file     Emotionally abused: Not on file     Physically abused: Not on file     Forced sexual activity: Not on file   Other Topics Concern    Not on file   Social History Narrative    Not on file         ALLERGIES: Patient has no known allergies. Review of Systems   Constitutional: Positive for activity change. Negative for appetite change, chills and fever. HENT: Negative for congestion, rhinorrhea, sinus pressure, sneezing and sore throat. Eyes: Negative for photophobia and visual disturbance. Respiratory: Positive for chest tightness. Negative for cough and shortness of breath. Cardiovascular: Positive for palpitations. Negative for chest pain. Gastrointestinal: Negative for abdominal pain, blood in stool, constipation, diarrhea, nausea and vomiting.    Genitourinary: Negative for difficulty urinating, dysuria, flank pain, frequency, hematuria, menstrual problem, urgency, vaginal bleeding and vaginal discharge. Musculoskeletal: Negative for arthralgias, back pain, myalgias and neck pain. Skin: Negative for rash and wound. Neurological: Negative for syncope, weakness, numbness and headaches. Psychiatric/Behavioral: Negative for self-injury and suicidal ideas. All other systems reviewed and are negative. Vitals:    01/20/20 0423 01/20/20 0429   BP: (!) 169/134    Pulse: (!) 122 78   Resp: 18 18   Temp: 98.3 °F (36.8 °C)    SpO2: 98%             Physical Exam  Vitals signs and nursing note reviewed. Constitutional:       General: She is not in acute distress. Appearance: Normal appearance. She is well-developed. She is not diaphoretic. Comments: Uncomfortable appearing but no acute distress. HENT:      Head: Normocephalic and atraumatic. Nose: Nose normal.   Eyes:      Extraocular Movements: Extraocular movements intact. Conjunctiva/sclera: Conjunctivae normal.      Pupils: Pupils are equal, round, and reactive to light. Neck:      Musculoskeletal: Neck supple. Cardiovascular:      Rate and Rhythm: Tachycardia present. Rhythm irregular. Heart sounds: Normal heart sounds. Pulmonary:      Effort: Pulmonary effort is normal.      Breath sounds: Normal breath sounds. Abdominal:      General: There is no distension. Palpations: Abdomen is soft. Tenderness: There is no tenderness. Musculoskeletal:         General: No tenderness. Skin:     General: Skin is warm and dry. Neurological:      General: No focal deficit present. Mental Status: She is alert and oriented to person, place, and time. GCS: GCS eye subscore is 4. GCS verbal subscore is 5. GCS motor subscore is 6. Cranial Nerves: No cranial nerve deficit. Sensory: No sensory deficit. Motor: No weakness. Coordination: Coordination normal.          MDM   70-year-old female presented with atrial fibrillation with RVR. While being evaluated in the ED rhythm was seen to change on the monitor and she reverted back to NSR without intervention. Labs returned reassuringly showing no significant abnormalities. Initial troponin negative    Monitored in the ED and had no recurrence of symptoms. Repeat troponin negative. Reassurance was given. She was recommended to follow-up closely with her cardiologist to further evaluate her paroxysmal symptoms. Return precautions were given for worsening or concerns. This plan was discussed with the patient and her  at the bedside and they stated both understanding and agreement. Procedures    6939 EKG shows atrial fibrillation with RVR with occasional PVCs with ST depression inferolaterally with no ST elevation with a rate of 137 bpm    0432 repeat EKG after she was seen to have a rhythm change on cardiac monitor, and shows normal sinus rhythm with a rate of 79 bpm with no ST or T wave abnormality suggestive of ischemia.

## 2020-07-10 ENCOUNTER — HOSPITAL ENCOUNTER (OUTPATIENT)
Dept: MAMMOGRAPHY | Age: 80
Discharge: HOME | End: 2020-07-10
Attending: FAMILY MEDICINE
Payer: MEDICARE

## 2020-07-10 DIAGNOSIS — Z12.31 VISIT FOR SCREENING MAMMOGRAM: ICD-10-CM

## 2020-07-10 PROCEDURE — 77063 BREAST TOMOSYNTHESIS BI: CPT

## 2020-10-10 ENCOUNTER — TRANSCRIBE ORDER (OUTPATIENT)
Dept: REGISTRATION | Age: 80
End: 2020-10-10

## 2020-10-10 ENCOUNTER — HOSPITAL ENCOUNTER (OUTPATIENT)
Dept: PREADMISSION TESTING | Age: 80
Discharge: HOME | End: 2020-10-10
Payer: MEDICARE

## 2020-10-10 DIAGNOSIS — Z01.812 PRE-PROCEDURE LAB EXAM: Primary | ICD-10-CM

## 2020-10-10 DIAGNOSIS — Z01.812 PRE-PROCEDURE LAB EXAM: ICD-10-CM

## 2020-10-10 PROCEDURE — 87635 SARS-COV-2 COVID-19 AMP PRB: CPT

## 2020-10-11 LAB — SARS-COV-2, COV2NT: NOT DETECTED

## 2020-10-14 ENCOUNTER — HOSPITAL ENCOUNTER (OUTPATIENT)
Age: 80
Setting detail: OUTPATIENT SURGERY
Discharge: HOME | End: 2020-10-14
Attending: INTERNAL MEDICINE | Admitting: INTERNAL MEDICINE
Payer: MEDICARE

## 2020-10-14 ENCOUNTER — ANESTHESIA (OUTPATIENT)
Dept: ENDOSCOPY | Age: 80
End: 2020-10-14
Payer: MEDICARE

## 2020-10-14 ENCOUNTER — ANESTHESIA EVENT (OUTPATIENT)
Dept: ENDOSCOPY | Age: 80
End: 2020-10-14
Payer: MEDICARE

## 2020-10-14 VITALS
SYSTOLIC BLOOD PRESSURE: 186 MMHG | HEIGHT: 68 IN | TEMPERATURE: 97.8 F | OXYGEN SATURATION: 100 % | HEART RATE: 61 BPM | BODY MASS INDEX: 31.37 KG/M2 | DIASTOLIC BLOOD PRESSURE: 96 MMHG | WEIGHT: 207 LBS | RESPIRATION RATE: 19 BRPM

## 2020-10-14 PROCEDURE — 76040000019: Performed by: INTERNAL MEDICINE

## 2020-10-14 PROCEDURE — 2709999900 HC NON-CHARGEABLE SUPPLY: Performed by: INTERNAL MEDICINE

## 2020-10-14 PROCEDURE — 74011000250 HC RX REV CODE- 250: Performed by: NURSE ANESTHETIST, CERTIFIED REGISTERED

## 2020-10-14 PROCEDURE — 76060000031 HC ANESTHESIA FIRST 0.5 HR: Performed by: INTERNAL MEDICINE

## 2020-10-14 PROCEDURE — 88305 TISSUE EXAM BY PATHOLOGIST: CPT

## 2020-10-14 PROCEDURE — 77030029384 HC SNR POLYP CAPTVR II BSC -B: Performed by: INTERNAL MEDICINE

## 2020-10-14 PROCEDURE — 74011250636 HC RX REV CODE- 250/636: Performed by: NURSE ANESTHETIST, CERTIFIED REGISTERED

## 2020-10-14 RX ORDER — PROPOFOL 10 MG/ML
INJECTION, EMULSION INTRAVENOUS AS NEEDED
Status: DISCONTINUED | OUTPATIENT
Start: 2020-10-14 | End: 2020-10-14 | Stop reason: HOSPADM

## 2020-10-14 RX ORDER — SODIUM CHLORIDE 9 MG/ML
150 INJECTION, SOLUTION INTRAVENOUS CONTINUOUS
Status: DISCONTINUED | OUTPATIENT
Start: 2020-10-14 | End: 2020-10-14 | Stop reason: HOSPADM

## 2020-10-14 RX ORDER — DEXTROMETHORPHAN/PSEUDOEPHED 2.5-7.5/.8
1.2 DROPS ORAL
Status: DISCONTINUED | OUTPATIENT
Start: 2020-10-14 | End: 2020-10-14 | Stop reason: HOSPADM

## 2020-10-14 RX ORDER — MIDAZOLAM HYDROCHLORIDE 1 MG/ML
.25-5 INJECTION, SOLUTION INTRAMUSCULAR; INTRAVENOUS
Status: DISCONTINUED | OUTPATIENT
Start: 2020-10-14 | End: 2020-10-14 | Stop reason: HOSPADM

## 2020-10-14 RX ORDER — SODIUM CHLORIDE 9 MG/ML
INJECTION, SOLUTION INTRAVENOUS
Status: DISCONTINUED | OUTPATIENT
Start: 2020-10-14 | End: 2020-10-14 | Stop reason: HOSPADM

## 2020-10-14 RX ORDER — FENTANYL CITRATE 50 UG/ML
200 INJECTION, SOLUTION INTRAMUSCULAR; INTRAVENOUS
Status: DISCONTINUED | OUTPATIENT
Start: 2020-10-14 | End: 2020-10-14 | Stop reason: HOSPADM

## 2020-10-14 RX ORDER — SODIUM CHLORIDE 0.9 % (FLUSH) 0.9 %
5-40 SYRINGE (ML) INJECTION EVERY 8 HOURS
Status: DISCONTINUED | OUTPATIENT
Start: 2020-10-14 | End: 2020-10-14 | Stop reason: HOSPADM

## 2020-10-14 RX ORDER — SODIUM CHLORIDE 0.9 % (FLUSH) 0.9 %
5-40 SYRINGE (ML) INJECTION AS NEEDED
Status: DISCONTINUED | OUTPATIENT
Start: 2020-10-14 | End: 2020-10-14 | Stop reason: HOSPADM

## 2020-10-14 RX ORDER — ATROPINE SULFATE 0.1 MG/ML
0.5 INJECTION INTRAVENOUS
Status: DISCONTINUED | OUTPATIENT
Start: 2020-10-14 | End: 2020-10-14 | Stop reason: HOSPADM

## 2020-10-14 RX ORDER — LIDOCAINE HYDROCHLORIDE 20 MG/ML
INJECTION, SOLUTION EPIDURAL; INFILTRATION; INTRACAUDAL; PERINEURAL AS NEEDED
Status: DISCONTINUED | OUTPATIENT
Start: 2020-10-14 | End: 2020-10-14 | Stop reason: HOSPADM

## 2020-10-14 RX ORDER — EPINEPHRINE 0.1 MG/ML
1 INJECTION INTRACARDIAC; INTRAVENOUS
Status: DISCONTINUED | OUTPATIENT
Start: 2020-10-14 | End: 2020-10-14 | Stop reason: HOSPADM

## 2020-10-14 RX ADMIN — PROPOFOL 30 MG: 10 INJECTION, EMULSION INTRAVENOUS at 13:33

## 2020-10-14 RX ADMIN — PROPOFOL 40 MG: 10 INJECTION, EMULSION INTRAVENOUS at 13:17

## 2020-10-14 RX ADMIN — PROPOFOL 40 MG: 10 INJECTION, EMULSION INTRAVENOUS at 13:20

## 2020-10-14 RX ADMIN — PROPOFOL 30 MG: 10 INJECTION, EMULSION INTRAVENOUS at 13:21

## 2020-10-14 RX ADMIN — PROPOFOL 20 MG: 10 INJECTION, EMULSION INTRAVENOUS at 13:18

## 2020-10-14 RX ADMIN — LIDOCAINE HYDROCHLORIDE 60 MG: 20 INJECTION, SOLUTION EPIDURAL; INFILTRATION; INTRACAUDAL; PERINEURAL at 13:16

## 2020-10-14 RX ADMIN — PROPOFOL 30 MG: 10 INJECTION, EMULSION INTRAVENOUS at 13:29

## 2020-10-14 RX ADMIN — PROPOFOL 40 MG: 10 INJECTION, EMULSION INTRAVENOUS at 13:16

## 2020-10-14 RX ADMIN — PROPOFOL 30 MG: 10 INJECTION, EMULSION INTRAVENOUS at 13:26

## 2020-10-14 RX ADMIN — SODIUM CHLORIDE: 900 INJECTION, SOLUTION INTRAVENOUS at 13:16

## 2020-10-14 RX ADMIN — PROPOFOL 30 MG: 10 INJECTION, EMULSION INTRAVENOUS at 13:24

## 2020-10-14 NOTE — ANESTHESIA POSTPROCEDURE EVALUATION
Post-Anesthesia Evaluation and Assessment    Patient: Marcia Bustillos MRN: [id number]  SSN: [id number]    YOB: 1940  Age: 68 y.o. Sex: female      I have evaluated the patient and they are stable and ready for discharge from the PACU. Cardiovascular Function/Vital Signs  Visit Vitals  /77   Pulse 63   Temp 36.6 °C (97.8 °F)   Resp 12   Ht 5' 8\" (1.727 m)   Wt 93.9 kg (207 lb)   SpO2 100%   BMI 31.47 kg/m²       Patient is status post MAC anesthesia for Procedure(s):  COLONOSCOPY    :-  ENDOSCOPIC POLYPECTOMY. Nausea/Vomiting: None    Postoperative hydration reviewed and adequate. Pain:  Pain Scale 1: Numeric (0 - 10) (10/14/20 1228)  Pain Intensity 1: 0 (10/14/20 1228)   Managed    Neurological Status: At baseline    Mental Status, Level of Consciousness: Alert and  oriented to person, place, and time    Pulmonary Status:   O2 Device: CO2 nasal cannula (10/14/20 1338)   Adequate oxygenation and airway patent    Complications related to anesthesia: None    Post-anesthesia assessment completed. No concerns    Signed By: Venecia Vizcarra MD     October 14, 2020              Procedure(s):  COLONOSCOPY    :-  ENDOSCOPIC POLYPECTOMY. MAC    <BSHSIANPOST>    INITIAL Post-op Vital signs: No vitals data found for the desired time range.

## 2020-10-14 NOTE — ANESTHESIA PREPROCEDURE EVALUATION
Relevant Problems   No relevant active problems       Anesthetic History   No history of anesthetic complications            Review of Systems / Medical History  Patient summary reviewed, nursing notes reviewed and pertinent labs reviewed    Pulmonary  Within defined limits                 Neuro/Psych   Within defined limits           Cardiovascular  Within defined limits  Hypertension: well controlled              Exercise tolerance: >4 METS  Comments: LBBB  Cath/echo 2019: EF 30%   GI/Hepatic/Renal  Within defined limits   GERD: well controlled           Endo/Other  Within defined limits    Hypothyroidism: well controlled  Arthritis     Other Findings              Physical Exam    Airway  Mallampati: II  TM Distance: > 6 cm  Neck ROM: normal range of motion   Mouth opening: Normal     Cardiovascular          Murmur: Grade 2, Mitral area     Dental  No notable dental hx       Pulmonary  Breath sounds clear to auscultation               Abdominal  GI exam deferred       Other Findings            Anesthetic Plan    ASA: 3  Anesthesia type: MAC          Induction: Intravenous  Anesthetic plan and risks discussed with: Patient

## 2020-10-14 NOTE — ROUTINE PROCESS
Demian Friend 1940 
[id number] Situation: 
Verbal report received from: [facility] Procedure: Procedure(s): 
COLONOSCOPY    :- 
ENDOSCOPIC POLYPECTOMY Background: 
 
Preoperative diagnosis: COLON POLYPS Postoperative diagnosis: diverticulosis 
colon polyps :  Dr. Jade Hernandez Assistant(s): Endoscopy Technician-1: Lenny Zhou Endoscopy RN-1: Rodrick Tolliver RN Specimens:  
ID Type Source Tests Collected by Time Destination 1 : Ascending polyp Preservative Colon, Ascending  Patel Perez MD 10/14/2020 1326 Pathology 2 : Transverse polyp Preservative Colon, Transverse  Patel Perez MD 10/14/2020 1331 Pathology H. Pylori Assessment: 
Intra-procedure medications Anesthesia gave intra-procedure sedation and medications, see anesthesia flow sheet yes Intravenous fluids: NS@ Daralyn Sears Vital signs stable yes Abdominal assessment: round and soft yes Recommendation: 
Discharge patient per MD order   Return to floor Family or Friend yes Permission to share finding with family or friend yes

## 2020-10-14 NOTE — PROCEDURES
Isaías Hubbard [facility] 9[address] Tess Barrientos M.D.  [facility] [address] [facility]  [phone]               Colonoscopy Procedure Note    NAME: Fernandez Miller  :  1940  MRN:  [id number]    Indications:   Personal history of colon polyps (screening only)     : Johnie Martins MD    Referring Provider:  Chintan Parada MD    Staff: Endoscopy Radha [place]: Carlos Da Silva  Endoscopy RN-1: Dillan Wallis RN    Prosthetic devices, grafts, tissues, transplant, or devices implanted: none    Medicines:  MAC anesthesia      Procedure Details:  After informed consent was obtained with all risks and benefits of the procedure explained and preprocedure exam completed, the patient was placed in the left lateral decubitus position. Universal protocol for patient identification was performed and documented in the nursing notes. Throughout the procedure, the patient's blood pressure was monitored at least every five minutes; pulse, and oxygen saturations were monitored continuously. All vital signs were documented in the nursing notes. A digital rectal exam was performed and was normal.  The Olympus videocolonoscope  was inserted in the rectum and carefully advanced to the cecum, which was identified by the ileocecal valve and appendiceal orifice. The colonoscope was slowly withdrawn with careful evaluation between folds. Retroflexion in the rectum was performed; findings and interventions are described below. Procedure start time, extent reached time/cecum time, and procedure end time are documented in the nursing notes. The quality of preparation was good.        Findings:   Rectum: normal  Sigmoid:     - Diverticulosis  Descending Colon:     - Diverticulosis  Transverse Colon:     - Diverticulosis; 8 mm sessile polyp s/p cold snare polypectomy  Ascending Colon:     - Diverticulosis; 5 mm sessile polyp s/p cold snare polypectomy  Cecum:     - Diverticulum    Interventions:    2 complete polypectomy were performed using cold snare  and the polyps were  retrieved    Specimens:   ID Type Source Tests Collected by Time Destination   1 : Ascending polyp Preservative Colon, Ascending  David Bueno MD 10/14/2020 1326 Pathology   2 : Transverse polyp Preservative Colon, Transverse  David Bueno MD 10/14/2020 1331 Pathology       EBL:  None. Complications:   No immediate complications     Impression:  -See post-procedure diagnoses. Recommendations:   -Repeat colonoscopy in 5 years. If < 10 years, reason:  above average risk patient    Resume Eliquis tomorrow      Signed by:  Malu Gardner MD          10/14/2020  1:40 PM

## 2020-10-14 NOTE — H&P
[address] [place] [address] [address]          Pre-procedure History and Physical       NAME:  Dennys Reed   :   1940   MRN:   [id number]     CHIEF COMPLAINT/HPI: history colon polyps    PMH:  Past Medical History:   Diagnosis Date    Arrhythmia     attempted ablation    Arthritis     GERD (gastroesophageal reflux disease)     High cholesterol     History of seasonal allergies     Hypertension     Sleep apnea     mild-does not use CPAP    Thyroid disease        PSH:  Past Surgical History:   Procedure Laterality Date    BREAST SURGERY PROCEDURE UNLISTED      breast biopsy-local - benign    CARDIAC SURG PROCEDURE UNLIST      attempted ablation    HX BREAST BIOPSY Right Years ago    Negative    HX CATARACT REMOVAL      bilateral    HX OTHER SURGICAL      cyst removed from left cheek       Allergies:  No Known Allergies    Home Medications:  Prior to Admission Medications   Prescriptions Last Dose Informant Patient Reported? Taking?   atorvastatin (LIPITOR) 10 mg tablet 10/13/2020 at Unknown time  Yes Yes   Sig: Take 20 mg by mouth daily. bumetanide (BUMEX) 1 mg tablet Not Taking at Unknown time  Yes No   Sig: Take 1 mg by mouth daily. carvedilol (COREG) 3.125 mg tablet Not Taking at Unknown time  No No   Sig: Take 1 Tab by mouth two (2) times daily (with meals). Indications: chronic heart failure   co-enzyme Q-10 (COQ-10) 100 mg capsule 10/13/2020 at Unknown time  Yes Yes   Sig: Take 100 mg by mouth daily. levothyroxine (SYNTHROID) 75 mcg tablet 10/14/2020 at Unknown time  Yes Yes   Sig: Take 75 mcg by mouth Daily (before breakfast). lisinopril (PRINIVIL, ZESTRIL) 5 mg tablet Not Taking at Unknown time  No No   Sig: Take 1 Tab by mouth daily. loratadine (CLARITIN) 10 mg tablet Not Taking at Unknown time  Yes No   Sig: Take 10 mg by mouth daily.    metoprolol tartrate (LOPRESSOR) 25 mg tablet 10/13/2020 at Unknown time  Yes Yes   Sig: Take  by mouth two (2) times a day. pravastatin (PRAVACHOL) 20 mg tablet Not Taking at Unknown time  Yes No   Sig: Take 20 mg by mouth daily. ramipril (ALTACE) 1.25 mg capsule 10/14/2020 at Unknown time  Yes Yes   Sig: Take  by mouth daily. Facility-Administered Medications: None       Hospital Medications:  Current Facility-Administered Medications   Medication Dose Route Frequency    0.9% sodium chloride infusion  150 mL/hr IntraVENous CONTINUOUS    sodium chloride (NS) flush 5-40 mL  5-40 mL IntraVENous Q8H    sodium chloride (NS) flush 5-40 mL  5-40 mL IntraVENous PRN    midazolam (VERSED) injection 0.25-5 mg  0.25-5 mg IntraVENous Multiple    fentaNYL citrate (PF) injection 200 mcg  200 mcg IntraVENous Multiple    simethicone (MYLICON) 80EW/8.0MF oral drops 80 mg  1.2 mL Oral Multiple    atropine injection 0.5 mg  0.5 mg IntraVENous ONCE PRN    EPINEPHrine (ADRENALIN) 0.1 mg/mL syringe 1 mg  1 mg Endoscopically ONCE PRN       Family History:  Family History   Problem Relation Age of Onset    Other Mother         multiple myeloma    Stroke Father     Heart Disease Brother         ablation       Social History:  Social History     Tobacco Use    Smoking status: Former Smoker     Years: 10.00     Last attempt to quit: 1974     Years since quittin.4    Smokeless tobacco: Never Used   Substance Use Topics    Alcohol use: Yes     Alcohol/week: 0.0 standard drinks     Types: 3 - 4 Glasses of wine per week       The patient was counseled at length about the risks of martha Covid-19 in the jackeline-operative and post-operative states including the recovery window of their procedure. The patient was made aware that martha Covid-19 after a surgical procedure may worsen their prognosis for recovering from the virus and lend to a higher morbidity and or mortality risk. The patient was given the options of postponing their procedure. All of the risks, benefits, and alternatives were discussed.  The patient does  wish to proceed with the procedure. PHYSICAL EXAM PRIOR TO SEDATION:  General: Alert, in no acute distress    Lungs:            CTA bilaterally  Heart:  Normal S1, S2    Abdomen: Soft, Non distended, Non tender. Normoactive bowel sounds. Assessment:   Stable for sedation administration.   Date of last colonoscopy: 5 yrs, Polyps  Yes    Plan:     · Endoscopic procedure with sedation     Signed By: Malu Gardner MD     10/14/2020  1:11 PM

## 2020-10-14 NOTE — DISCHARGE INSTRUCTIONS
Patient Education        Diverticulosis: Care Instructions  Your Care Instructions  In diverticulosis, pouches called diverticula form in the wall of the large intestine (colon). The pouches do not cause any pain or other symptoms. Most people who have diverticulosis do not know they have it. But the pouches sometimes bleed, and if they become infected, they can cause pain and other symptoms. When this happens, it is called diverticulitis. Diverticula form when pressure pushes the wall of the colon outward at certain weak points. A diet that is too low in fiber can cause diverticula. Follow-up care is a key part of your treatment and safety. Be sure to make and go to all appointments, and call your doctor if you are having problems. It's also a good idea to know your test results and keep a list of the medicines you take. How can you care for yourself at home? · Include fruits, leafy green vegetables, beans, and whole grains in your diet each day. These foods are high in fiber. · Take a fiber supplement, such as Citrucel or Metamucil, every day if needed. Read and follow all instructions on the label. · Drink plenty of fluids, enough so that your urine is light yellow or clear like water. If you have kidney, heart, or liver disease and have to limit fluids, talk with your doctor before you increase the amount of fluids you drink. · Get at least 30 minutes of exercise on most days of the week. Walking is a good choice. You also may want to do other activities, such as running, swimming, cycling, or playing tennis or team sports. · Cut out foods that cause gas, pain, or other symptoms. When should you call for help?    Call your doctor now or seek immediate medical care if:    · You have belly pain.     · You pass maroon or very bloody stools.     · You have a fever.     · You have nausea and vomiting.     · You have unusual changes in your bowel movements or abdominal swelling.     · You have burning pain when you urinate.     · You have abnormal vaginal discharge.     · You have shoulder pain.     · You have cramping pain that does not get better when you have a bowel movement or pass gas.     · You pass gas or stool from your urethra while urinating. Watch closely for changes in your health, and be sure to contact your doctor if you have any problems. Where can you learn more? Go to http://www.gray.com/  Enter [id number] in the search box to learn more about \"Diverticulosis: Care Instructions. \"  Current as of: April 15, 2020               Content Version: 12.6  © 6980-7667 [employer]. Care instructions adapted under license by [employer] (which disclaims liability or warranty for this information). If you have questions about a medical condition or this instruction, always ask your healthcare professional. [place] [address] any warranty or liability for your use of this information. Isaías Wills 9[address] Davi Lee M.D.  Drea Brunner, [address]  [phone]          [address] [place] [address]  [id number]  1940    DISCOMFORT:  Redness at IV site- apply warm compress to area; if redness or soreness persist- contact your physician  There may be a slight amount of blood passed from the rectum  Gaseous discomfort- walking, belching will help relieve any discomfort  You may not operate a vehicle for 12 hours  You may not engage in an occupation involving machinery or appliances for the  rest of today  You may not drink alcoholic beverages for at least 12 hours  Avoid making any critical decisions for at least 24 hours    DIET:   You may resume your normal diet, but some patients find that heavy or large  meals may lead to indigestion or vomiting. I suggest a light meal as first food  intake. I recommend a whole food, plant-based diet for your overall health.     ACTIVITY:  You may resume your normal daily activities. It is recommended that you spend the remainder of the day resting - avoid any strenuous activity. CALL M.D. IF ANY SIGN OF:   Increasing pain, nausea, vomiting  Abdominal distension (swelling)  Significant bleeding (oral or rectal)  Fever   Pain in chest area  Shortness of breath    Additional Instructions:   Call Dr. Vita Callahan if any questions or problems at [phone]   You should receive the biopsy results by phone or mail within 3 weeks, if not, call  my office for the results      Should have a repeat colonoscopy in 5 years. Colonoscopy showed 2 polyps removed and pan-diverticulosis. Resume Eliquis tomorrow. Learning About Coronavirus [phone])  Coronavirus [phone]): Overview  What is coronavirus (COVID-19)? The coronavirus disease (COVID-19) is caused by a virus. It is an illness that was first found in [place], [place], in December 2019. It has since spread worldwide. The virus can cause fever, cough, and trouble breathing. In severe cases, it can cause pneumonia and make it hard to breathe without help. It can cause death. Coronaviruses are a large group of viruses. They cause the common cold. They also cause more serious illnesses like Middle East respiratory syndrome (MERS) and severe acute respiratory syndrome (SARS). COVID-19 is caused by a novel coronavirus. That means it's a new type that has not been seen in people before. This virus spreads person-to-person through droplets from coughing and sneezing. It can also spread when you are close to someone who is infected. And it can spread when you touch something that has the virus on it, such as a doorknob or a tabletop. What can you do to protect yourself from coronavirus (COVID-19)? The best way to protect yourself from getting sick is to:  · Avoid areas where there is an outbreak. · Avoid contact with people who may be infected.   · Wash your hands often with soap or alcohol-based hand sanitizers. · Avoid crowds and try to stay at least 6 feet away from other people. · Wash your hands often, especially after you cough or sneeze. Use soap and water, and scrub for at least 20 seconds. If soap and water aren't available, use an alcohol-based hand . · Avoid touching your mouth, nose, and eyes. What can you do to avoid spreading the virus to others? To help avoid spreading the virus to others:  · Cover your mouth with a tissue when you cough or sneeze. Then throw the tissue in the trash. · Use a disinfectant to clean things that you touch often. · Stay home if you are sick or have been exposed to the virus. Don't go to school, work, or public areas. And don't use public transportation. · If you are sick:  ? Leave your home only if you need to get medical care. But call the doctor's office first so they know you're coming. And wear a face mask, if you have one.  ? If you have a face mask, wear it whenever you're around other people. It can help stop the spread of the virus when you cough or sneeze. ? Clean and disinfect your home every day. Use household  and disinfectant wipes or sprays. Take special care to clean things that you grab with your hands. These include doorknobs, remote controls, phones, and handles on your refrigerator and microwave. And don't forget countertops, tabletops, bathrooms, and computer keyboards. When to call for help  Call 911 anytime you think you may need emergency care. For example, call if:  · You have severe trouble breathing. (You can't talk at all.)  · You have constant chest pain or pressure. · You are severely dizzy or lightheaded. · You are confused or can't think clearly. · Your face and lips have a blue color. · You pass out (lose consciousness) or are very hard to wake up. Call your doctor now if you develop symptoms such as:  · Shortness of breath. · Fever. · Cough.   If you need to get care, call ahead to the doctor's office for instructions before you go. Make sure you wear a face mask, if you have one, to prevent exposing other people to the virus. Where can you get the latest information? The following health organizations are tracking and studying this virus. Their websites contain the most up-to-date information. Tyra Schumacher also learn what to do if you think you may have been exposed to the virus. · U.S. Centers for Disease Control and Prevention (CDC): The CDC provides updated news about the disease and travel advice. The website also tells you how to prevent the spread of infection. www.cdc.gov  · World Health Organization [facility]): WHO offers information about the virus outbreaks. WHO also has travel advice. www.who.int  Current as of: April 1, 2020               Content Version: 12.4  © 2006-2020 [employer]. Care instructions adapted under license by your healthcare professional. If you have questions about a medical condition or this instruction, always ask your healthcare professional. [address] any warranty or liability for your use of this information.

## 2020-10-14 NOTE — PERIOP NOTES

## 2021-02-09 ENCOUNTER — HOSPITAL ENCOUNTER (OUTPATIENT)
Dept: MAMMOGRAPHY | Age: 81
Discharge: HOME | End: 2021-02-09
Attending: FAMILY MEDICINE
Payer: MEDICARE

## 2021-02-09 DIAGNOSIS — N64.4 MASTODYNIA: ICD-10-CM

## 2021-02-09 PROCEDURE — 77065 DX MAMMO INCL CAD UNI: CPT

## 2021-08-17 ENCOUNTER — TRANSCRIBE ORDER (OUTPATIENT)
Dept: SCHEDULING | Age: 81
End: 2021-08-17

## 2021-08-17 DIAGNOSIS — I70.211 ATHEROSCLEROSIS OF NATIVE ARTERY OF RIGHT LOWER EXTREMITY WITH INTERMITTENT CLAUDICATION (HCC): Primary | ICD-10-CM

## 2021-08-17 DIAGNOSIS — I70.212 ATHEROSCLEROSIS OF NATIVE ARTERY OF LEFT LOWER EXTREMITY WITH INTERMITTENT CLAUDICATION (HCC): ICD-10-CM

## 2021-08-27 ENCOUNTER — HOSPITAL ENCOUNTER (OUTPATIENT)
Dept: VASCULAR SURGERY | Age: 81
Discharge: HOME | End: 2021-08-27
Attending: PODIATRIST
Payer: MEDICARE

## 2021-08-27 DIAGNOSIS — I70.211 ATHEROSCLEROSIS OF NATIVE ARTERY OF RIGHT LOWER EXTREMITY WITH INTERMITTENT CLAUDICATION (HCC): ICD-10-CM

## 2021-08-27 DIAGNOSIS — I70.212 ATHEROSCLEROSIS OF NATIVE ARTERY OF LEFT LOWER EXTREMITY WITH INTERMITTENT CLAUDICATION (HCC): ICD-10-CM

## 2021-08-27 PROCEDURE — 93922 UPR/L XTREMITY ART 2 LEVELS: CPT

## 2021-08-29 LAB
LEFT ABI: 1.07
LEFT ANTERIOR TIBIAL: 187 MMHG
LEFT ARM BP: 183 MMHG
LEFT POSTERIOR TIBIAL: 201 MMHG
LEFT TBI: 0
LEFT TOE PRESSURE: 0 MMHG
RIGHT ABI: 1.12
RIGHT ANTERIOR TIBIAL: 190 MMHG
RIGHT ARM BP: 188 MMHG
RIGHT POSTERIOR TIBIAL: 210 MMHG
RIGHT TBI: 0
RIGHT TOE PRESSURE: 0 MMHG

## 2021-10-14 ENCOUNTER — TRANSCRIBE ORDER (OUTPATIENT)
Dept: SCHEDULING | Age: 81
End: 2021-10-14

## 2021-10-14 DIAGNOSIS — M51.36 DDD (DEGENERATIVE DISC DISEASE), LUMBAR: ICD-10-CM

## 2021-10-14 DIAGNOSIS — M54.16 LEFT LUMBAR RADICULOPATHY: Primary | ICD-10-CM

## 2021-10-22 ENCOUNTER — HOSPITAL ENCOUNTER (OUTPATIENT)
Dept: MRI IMAGING | Age: 81
Discharge: HOME | End: 2021-10-22
Attending: FAMILY MEDICINE
Payer: MEDICARE

## 2021-10-22 DIAGNOSIS — M54.16 LEFT LUMBAR RADICULOPATHY: ICD-10-CM

## 2021-10-22 DIAGNOSIS — M51.36 DDD (DEGENERATIVE DISC DISEASE), LUMBAR: ICD-10-CM

## 2021-10-22 PROCEDURE — 72148 MRI LUMBAR SPINE W/O DYE: CPT

## 2021-12-16 ENCOUNTER — HOSPITAL ENCOUNTER (EMERGENCY)
Age: 81
Discharge: HOME | End: 2021-12-17
Attending: EMERGENCY MEDICINE
Payer: MEDICARE

## 2021-12-16 DIAGNOSIS — R60.0 BILATERAL LEG EDEMA: ICD-10-CM

## 2021-12-16 DIAGNOSIS — I10 PRIMARY HYPERTENSION: Primary | ICD-10-CM

## 2021-12-16 LAB
BASOPHILS # BLD: 0 K/UL (ref 0–0.1)
BASOPHILS NFR BLD: 1 % (ref 0–1)
COMMENT, HOLDF: NORMAL
DIFFERENTIAL METHOD BLD: ABNORMAL
EOSINOPHIL # BLD: 0.1 K/UL (ref 0–0.4)
EOSINOPHIL NFR BLD: 2 % (ref 0–7)
ERYTHROCYTE [DISTWIDTH] IN BLOOD BY AUTOMATED COUNT: 12.9 % (ref 11.5–14.5)
HCT VFR BLD AUTO: 38 % (ref 35–47)
HGB BLD-MCNC: 12.5 G/DL (ref 11.5–16)
IMM GRANULOCYTES # BLD AUTO: 0 K/UL (ref 0–0.04)
IMM GRANULOCYTES NFR BLD AUTO: 0 % (ref 0–0.5)
LYMPHOCYTES # BLD: 2.1 K/UL (ref 0.8–3.5)
LYMPHOCYTES NFR BLD: 24 % (ref 12–49)
MCH RBC QN AUTO: 31.2 PG (ref 26–34)
MCHC RBC AUTO-ENTMCNC: 32.9 G/DL (ref 30–36.5)
MCV RBC AUTO: 94.8 FL (ref 80–99)
MONOCYTES # BLD: 1.1 K/UL (ref 0–1)
MONOCYTES NFR BLD: 12 % (ref 5–13)
NEUTS SEG # BLD: 5.3 K/UL (ref 1.8–8)
NEUTS SEG NFR BLD: 62 % (ref 32–75)
NRBC # BLD: 0 K/UL (ref 0–0.01)
NRBC BLD-RTO: 0 PER 100 WBC
PLATELET # BLD AUTO: 270 K/UL (ref 150–400)
PMV BLD AUTO: 8.6 FL (ref 8.9–12.9)
RBC # BLD AUTO: 4.01 M/UL (ref 3.8–5.2)
SAMPLES BEING HELD,HOLD: NORMAL
WBC # BLD AUTO: 8.6 K/UL (ref 3.6–11)

## 2021-12-16 PROCEDURE — 83880 ASSAY OF NATRIURETIC PEPTIDE: CPT

## 2021-12-16 PROCEDURE — 99285 EMERGENCY DEPT VISIT HI MDM: CPT

## 2021-12-16 PROCEDURE — 96374 THER/PROPH/DIAG INJ IV PUSH: CPT

## 2021-12-16 PROCEDURE — 96375 TX/PRO/DX INJ NEW DRUG ADDON: CPT

## 2021-12-16 PROCEDURE — 93005 ELECTROCARDIOGRAM TRACING: CPT

## 2021-12-16 PROCEDURE — 85025 COMPLETE CBC W/AUTO DIFF WBC: CPT

## 2021-12-16 PROCEDURE — 36415 COLL VENOUS BLD VENIPUNCTURE: CPT

## 2021-12-16 PROCEDURE — 80053 COMPREHEN METABOLIC PANEL: CPT

## 2021-12-16 PROCEDURE — 84484 ASSAY OF TROPONIN QUANT: CPT

## 2021-12-16 RX ORDER — TRAMADOL HYDROCHLORIDE 50 MG/1
50 TABLET ORAL
COMMUNITY

## 2021-12-17 ENCOUNTER — APPOINTMENT (OUTPATIENT)
Dept: GENERAL RADIOLOGY | Age: 81
End: 2021-12-17
Attending: EMERGENCY MEDICINE
Payer: MEDICARE

## 2021-12-17 VITALS
HEART RATE: 66 BPM | OXYGEN SATURATION: 97 % | SYSTOLIC BLOOD PRESSURE: 185 MMHG | RESPIRATION RATE: 17 BRPM | HEIGHT: 68 IN | WEIGHT: 213.41 LBS | BODY MASS INDEX: 32.34 KG/M2 | TEMPERATURE: 98.1 F | DIASTOLIC BLOOD PRESSURE: 96 MMHG

## 2021-12-17 LAB
ALBUMIN SERPL-MCNC: 3.5 G/DL (ref 3.5–5)
ALBUMIN/GLOB SERPL: 0.9 {RATIO} (ref 1.1–2.2)
ALP SERPL-CCNC: 86 U/L (ref 45–117)
ALT SERPL-CCNC: 19 U/L (ref 12–78)
ANION GAP SERPL CALC-SCNC: 8 MMOL/L (ref 5–15)
AST SERPL-CCNC: 10 U/L (ref 15–37)
BILIRUB SERPL-MCNC: 0.3 MG/DL (ref 0.2–1)
BNP SERPL-MCNC: 954 PG/ML (ref 0–450)
BUN SERPL-MCNC: 23 MG/DL (ref 6–20)
BUN/CREAT SERPL: 23 (ref 12–20)
CALCIUM SERPL-MCNC: 9 MG/DL (ref 8.5–10.1)
CHLORIDE SERPL-SCNC: 99 MMOL/L (ref 97–108)
CO2 SERPL-SCNC: 27 MMOL/L (ref 21–32)
CREAT SERPL-MCNC: 1 MG/DL (ref 0.55–1.02)
GLOBULIN SER CALC-MCNC: 4 G/DL (ref 2–4)
GLUCOSE SERPL-MCNC: 108 MG/DL (ref 65–100)
POTASSIUM SERPL-SCNC: 4.2 MMOL/L (ref 3.5–5.1)
PROT SERPL-MCNC: 7.5 G/DL (ref 6.4–8.2)
SODIUM SERPL-SCNC: 134 MMOL/L (ref 136–145)
TROPONIN-HIGH SENSITIVITY: 15 NG/L (ref 0–51)

## 2021-12-17 PROCEDURE — 74011250636 HC RX REV CODE- 250/636: Performed by: EMERGENCY MEDICINE

## 2021-12-17 PROCEDURE — 96375 TX/PRO/DX INJ NEW DRUG ADDON: CPT

## 2021-12-17 PROCEDURE — 71046 X-RAY EXAM CHEST 2 VIEWS: CPT

## 2021-12-17 PROCEDURE — 96374 THER/PROPH/DIAG INJ IV PUSH: CPT

## 2021-12-17 RX ORDER — FUROSEMIDE 10 MG/ML
20 INJECTION INTRAMUSCULAR; INTRAVENOUS
Status: COMPLETED | OUTPATIENT
Start: 2021-12-17 | End: 2021-12-17

## 2021-12-17 RX ORDER — HYDRALAZINE HYDROCHLORIDE 20 MG/ML
10 INJECTION INTRAMUSCULAR; INTRAVENOUS
Status: COMPLETED | OUTPATIENT
Start: 2021-12-17 | End: 2021-12-17

## 2021-12-17 RX ORDER — FUROSEMIDE 20 MG/1
20 TABLET ORAL DAILY
Qty: 15 TABLET | Refills: 0 | Status: SHIPPED | OUTPATIENT
Start: 2021-12-17 | End: 2021-12-29

## 2021-12-17 RX ADMIN — FUROSEMIDE 20 MG: 10 INJECTION, SOLUTION INTRAMUSCULAR; INTRAVENOUS at 02:33

## 2021-12-17 RX ADMIN — HYDRALAZINE HYDROCHLORIDE 10 MG: 20 INJECTION INTRAMUSCULAR; INTRAVENOUS at 02:33

## 2021-12-17 NOTE — ED NOTES
Pt states that she feels comfortable managing her BP at home and will call her PCP and Cardiologist in the morning for follow-up.

## 2021-12-17 NOTE — ED TRIAGE NOTES
Pt c/o HTN at home that has worsened the last few days. Pt denies HA, denies CP, denies SOB. Pt took 50mg Metoprolol at 1850 tonight. Pt states that she has not missed any doses of her BP meds. In triage, pt /116 in her RUE and 194/103 in her LUE.

## 2021-12-17 NOTE — ED PROVIDER NOTES
HPI   79 yo F presents with elevated blood pressure at home this evening. Pt reports systolic over 840. Checks her bp regularly. Denies cp, sob, headache, weakness, numbness, vomiting, diarrhea. Denies any symptoms. Is on metoprolol 50mg (last took at 6:30pm) and takes lisinopril (last took this morning). Is on eliquis for hx of afib. Complains of mild edema bilateral lower extremities  Recently finished keflex for swelling of thumb, now improved. Past Medical History:   Diagnosis Date    Arrhythmia     attempted ablation    Arthritis     GERD (gastroesophageal reflux disease)     High cholesterol     History of seasonal allergies     Hypertension     Sleep apnea     mild-does not use CPAP    Thyroid disease        Past Surgical History:   Procedure Laterality Date    COLONOSCOPY N/A 10/14/2020    COLONOSCOPY    :- performed by Kati Blunt [facility] at [facility] ENDOSCOPY    HX BREAST BIOPSY Right Years ago    Negative    HX CATARACT REMOVAL      bilateral    HX OTHER SURGICAL      cyst removed from left cheek    MA BREAST SURGERY PROCEDURE UNLISTED      breast biopsy-local - benign    MA CARDIAC SURG PROCEDURE UNLIST      attempted ablation         Family History:   Problem Relation Age of Onset    Other Mother         multiple myeloma    Stroke Father     Heart Disease Brother         ablation       Social History     Socioeconomic History    Marital status:      Spouse name: Not on file    Number of children: Not on file    Years of education: Not on file    Highest education level: Not on file   Occupational History    Not on file   Tobacco Use    Smoking status: Former Smoker     Years: 10.00     Quit date: 1974     Years since quittin.6    Smokeless tobacco: Never Used   Substance and Sexual Activity    Alcohol use:  Yes     Alcohol/week: 0.0 standard drinks     Types: 3 - 4 Glasses of wine per week    Drug use: No    Sexual activity: Not on file   Other Topics Concern    Not on file   Social History Narrative    Not on file     Social Determinants of Health     Financial Resource Strain:     Difficulty of Paying Living Expenses: Not on file   Food Insecurity:     Worried About Running Out of Food in the Last Year: Not on file    Geoff of Food in the Last Year: Not on file   Transportation Needs:     Lack of Transportation (Medical): Not on file    Lack of Transportation (Non-Medical): Not on file   Physical Activity:     Days of Exercise per Week: Not on file    Minutes of Exercise per Session: Not on file   Stress:     Feeling of Stress : Not on file   Social Connections:     Frequency of Communication with Friends and Family: Not on file    Frequency of Social Gatherings with Friends and Family: Not on file    Attends Voodoo Services: Not on file    Active Member of [address] [place] [address] [employer] or Organizations: Not on file    Attends Club or Organization Meetings: Not on file    Marital Status: Not on file   Intimate Partner Violence:     Fear of Current or Ex-Partner: Not on file    Emotionally Abused: Not on file    Physically Abused: Not on file    Sexually Abused: Not on file   Housing Stability:     Unable to Pay for Housing in the Last Year: Not on file    Number of [place] in the Last Year: Not on file    Unstable Housing in the Last Year: Not on file         ALLERGIES: Patient has no known allergies. Review of Systems   Constitutional: Negative for chills and fever. Respiratory: Negative for cough and shortness of breath. Cardiovascular: Negative for chest pain. Gastrointestinal: Negative for abdominal pain. Musculoskeletal: Negative for neck pain and neck stiffness. Neurological: Negative for weakness, numbness and headaches. All other systems reviewed and are negative.       Vitals:    12/16/21 2313 12/16/21 2314 12/16/21 2330   BP: (!) 226/116 (!) 194/103 (!) 183/92   Pulse: 65  60   Resp: 18     Temp: 98.1 °F (36.7 °C)     SpO2: 98%  95% Weight: 96.8 kg (213 lb 6.5 oz)     Height: 5' 8\" (1.727 m)              Physical Exam   Physical Examination: General appearance - alert, well appearing, and in no distress, oriented to person, place, and time and normal appearing weight  Eyes - pupils equal and reactive, extraocular eye movements intact  Neck - supple, no significant adenopathy  Chest - clear to auscultation, no wheezes, rales or rhonchi, symmetric air entry  Heart - normal rate, regular rhythm, normal S1, S2, no murmurs, rubs, clicks or gallops  Abdomen - soft, nontender, nondistended, no masses or organomegaly  Back exam - full range of motion, no tenderness, palpable spasm or pain on motion  Neurological - alert, oriented, normal speech, no focal findings or movement disorder noted, normal finger-nose-finger, no nystagmus, no pronator drift  Musculoskeletal - no joint tenderness, deformity or swelling  Extremities - peripheral pulses normal, trace edema b/l LE, no clubbing or cyanosis  Skin - normal coloration and turgor, no rashes, no suspicious skin lesions noted  MDM  Number of Diagnoses or Management Options     Amount and/or Complexity of Data Reviewed  Clinical lab tests: ordered and reviewed  Decide to obtain previous medical records or to obtain history from someone other than the patient: yes  Obtain history from someone other than the patient: yes (spouse)  Review and summarize past medical records: yes  Independent visualization of images, tracings, or specimens: yes    Patient Progress  Patient progress: stable         Procedures  ED EKG interpretation:  Rhythm: sinus bradycardia; and irregular. Rate (approx.): 59; Axis: left axis deviation; P wave: normal; QRS interval: normal ; ST/T wave: non-specific changes; sinus arrhythmia  EKG documented by Yue Gutierrez MD    Patient presents with elevated blood pressure. She denies any symptoms other than mild edema in her lower extremities.   She denies chest pain, shortness of breath. Oxygen saturations 97% on room air lungs clear on exam and x-ray. Given elevated BNP and trace edema with elevated blood pressure will start patient on Lasix. Patient agrees to follow-up with her cardiologist, Dr. Marbella Yarbrough and to return to ED for worsening symptoms.

## 2021-12-17 NOTE — DISCHARGE INSTRUCTIONS
We hope that we have addressed all of your medical concerns. The examination and treatment you received in the Emergency Department were for an emergent problem and were not intended as complete care. It is important that you follow up with your healthcare provider(s) for ongoing care. If your symptoms worsen or do not improve as expected, and you are unable to reach your usual health care provider(s), you should return to the Emergency Department. Today's healthcare is undergoing tremendous change, and patient satisfaction surveys are one of the many tools to assess the quality of medical care. You may receive a survey from the [employer] regarding your experience in the Emergency Department. I hope that your experience has been completely positive, particularly the medical care that I provided. As such, please participate in the survey; anything less than excellent does not meet my expectations or intentions. [facility] and [address] [place] [address] participate in nationally recognized quality of care measures. If your blood pressure is greater than 120/80, as reported below, we urge that you seek medical care to address the potential of high blood pressure, commonly known as hypertension. Hypertension can be hereditary or can be caused by certain medical conditions, pain, stress, or \"white coat syndrome. \"       Please make an appointment with your health care provider(s) for follow up of your Emergency Department visit. VITALS:   Patient Vitals for the past 8 hrs:   Temp Pulse Resp BP SpO2   12/16/21 2330 -- 60 -- (!) 183/92 95 %   12/16/21 2314 -- -- -- (!) 194/103 --   12/16/21 2313 98.1 °F (36.7 °C) 65 18 (!) 226/116 98 %          Thank you for allowing us to provide you with medical care today. We realize that you have many choices for your emergency care needs. Please choose us in the future for any continued health care needs. Carlos Stoll [facility], [address] [place], [place] [address]y 20.   Office: [phone]            Recent Results (from the past 24 hour(s))   SAMPLES BEING HELD    Collection Time: 12/16/21 11:29 PM   Result Value Ref Range    SAMPLES BEING HELD RED,GREEN,LAV     COMMENT        Add-on orders for these samples will be processed based on acceptable specimen integrity and analyte stability, which may vary by analyte. TROPONIN-HIGH SENSITIVITY    Collection Time: 12/16/21 11:29 PM   Result Value Ref Range    Troponin-High Sensitivity 15 0 - 51 ng/L   NT-PRO BNP    Collection Time: 12/16/21 11:29 PM   Result Value Ref Range    NT pro- (H) 0 - 450 PG/ML   CBC WITH AUTOMATED DIFF    Collection Time: 12/16/21 11:29 PM   Result Value Ref Range    WBC 8.6 3.6 - 11.0 K/uL    RBC 4.01 3.80 - 5.20 M/uL    HGB 12.5 11.5 - 16.0 g/dL    HCT 38.0 35.0 - 47.0 %    MCV 94.8 80.0 - 99.0 FL    MCH 31.2 26.0 - 34.0 PG    MCHC 32.9 30.0 - 36.5 g/dL    RDW 12.9 11.5 - 14.5 %    PLATELET 832 047 - 821 K/uL    MPV 8.6 (L) 8.9 - 12.9 FL    NRBC 0.0 0  WBC    ABSOLUTE NRBC 0.00 0.00 - 0.01 K/uL    NEUTROPHILS 62 32 - 75 %    LYMPHOCYTES 24 12 - 49 %    MONOCYTES 12 5 - 13 %    EOSINOPHILS 2 0 - 7 %    BASOPHILS 1 0 - 1 %    IMMATURE GRANULOCYTES 0 0.0 - 0.5 %    ABS. NEUTROPHILS 5.3 1.8 - 8.0 K/UL    ABS. LYMPHOCYTES 2.1 0.8 - 3.5 K/UL    ABS. MONOCYTES 1.1 (H) 0.0 - 1.0 K/UL    ABS. EOSINOPHILS 0.1 0.0 - 0.4 K/UL    ABS. BASOPHILS 0.0 0.0 - 0.1 K/UL    ABS. IMM.  GRANS. 0.0 0.00 - 0.04 K/UL    DF AUTOMATED     METABOLIC PANEL, COMPREHENSIVE    Collection Time: 12/16/21 11:29 PM   Result Value Ref Range    Sodium 134 (L) 136 - 145 mmol/L    Potassium 4.2 3.5 - 5.1 mmol/L    Chloride 99 97 - 108 mmol/L    CO2 27 21 - 32 mmol/L    Anion gap 8 5 - 15 mmol/L    Glucose 108 (H) 65 - 100 mg/dL    BUN 23 (H) 6 - 20 MG/DL    Creatinine 1.00 0.55 - 1.02 MG/DL    BUN/Creatinine ratio 23 (H) 12 - 20      GFR est AA >60 >60 ml/min/1.73m2    GFR est non-AA 53 (L) >60 ml/min/1.73m2    Calcium 9.0 8.5 - 10.1 MG/DL    Bilirubin, total 0.3 0.2 - 1.0 MG/DL    ALT (SGPT) 19 12 - 78 U/L    AST (SGOT) 10 (L) 15 - 37 U/L    Alk. phosphatase 86 45 - 117 U/L    Protein, total 7.5 6.4 - 8.2 g/dL    Albumin 3.5 3.5 - 5.0 g/dL    Globulin 4.0 2.0 - 4.0 g/dL    A-G Ratio 0.9 (L) 1.1 - 2.2         XR CHEST PA LAT    Result Date: 12/17/2021  History: Leg swelling. Frontal and lateral views of the chest show clear lungs. The heart, mediastinum and pulmonary vasculature are normal. There is atherosclerosis of the aorta appear There are mild degenerative changes of the spine. No acute findings.

## 2021-12-18 LAB
ATRIAL RATE: 59 BPM
CALCULATED P AXIS, ECG09: 57 DEGREES
CALCULATED R AXIS, ECG10: -12 DEGREES
CALCULATED T AXIS, ECG11: 31 DEGREES
DIAGNOSIS, 93000: NORMAL
P-R INTERVAL, ECG05: 162 MS
Q-T INTERVAL, ECG07: 418 MS
QRS DURATION, ECG06: 94 MS
QTC CALCULATION (BEZET), ECG08: 413 MS
VENTRICULAR RATE, ECG03: 59 BPM

## 2021-12-19 ENCOUNTER — APPOINTMENT (OUTPATIENT)
Dept: GENERAL RADIOLOGY | Age: 81
DRG: 305 | End: 2021-12-19
Attending: EMERGENCY MEDICINE
Payer: MEDICARE

## 2021-12-19 ENCOUNTER — HOSPITAL ENCOUNTER (INPATIENT)
Age: 81
LOS: 8 days | Discharge: HOME HEALTH | DRG: 305 | End: 2021-12-29
Attending: EMERGENCY MEDICINE | Admitting: INTERNAL MEDICINE
Payer: MEDICARE

## 2021-12-19 DIAGNOSIS — I95.1 ORTHOSTATIC HYPOTENSION: ICD-10-CM

## 2021-12-19 DIAGNOSIS — R07.9 CHEST PAIN, UNSPECIFIED TYPE: Primary | ICD-10-CM

## 2021-12-19 DIAGNOSIS — R00.2 PALPITATIONS: ICD-10-CM

## 2021-12-19 DIAGNOSIS — R55 VASOVAGAL SYNCOPE: ICD-10-CM

## 2021-12-19 DIAGNOSIS — R93.0 ABNORMAL CT OF THE HEAD: ICD-10-CM

## 2021-12-19 LAB
ALBUMIN SERPL-MCNC: 3.6 G/DL (ref 3.5–5)
ALBUMIN/GLOB SERPL: 0.9 {RATIO} (ref 1.1–2.2)
ALP SERPL-CCNC: 81 U/L (ref 45–117)
ALT SERPL-CCNC: 24 U/L (ref 12–78)
ANION GAP SERPL CALC-SCNC: 9 MMOL/L (ref 5–15)
AST SERPL-CCNC: 19 U/L (ref 15–37)
ATRIAL RATE: 73 BPM
ATRIAL RATE: 84 BPM
BASOPHILS # BLD: 0 K/UL (ref 0–0.1)
BASOPHILS NFR BLD: 0 % (ref 0–1)
BILIRUB SERPL-MCNC: 0.6 MG/DL (ref 0.2–1)
BUN SERPL-MCNC: 20 MG/DL (ref 6–20)
BUN/CREAT SERPL: 21 (ref 12–20)
CALCIUM SERPL-MCNC: 9 MG/DL (ref 8.5–10.1)
CALCULATED P AXIS, ECG09: 48 DEGREES
CALCULATED P AXIS, ECG09: 73 DEGREES
CALCULATED R AXIS, ECG10: -19 DEGREES
CALCULATED R AXIS, ECG10: -22 DEGREES
CALCULATED T AXIS, ECG11: 56 DEGREES
CALCULATED T AXIS, ECG11: 64 DEGREES
CHLORIDE SERPL-SCNC: 95 MMOL/L (ref 97–108)
CO2 SERPL-SCNC: 28 MMOL/L (ref 21–32)
COMMENT, HOLDF: NORMAL
CREAT SERPL-MCNC: 0.96 MG/DL (ref 0.55–1.02)
DIAGNOSIS, 93000: NORMAL
DIAGNOSIS, 93000: NORMAL
DIFFERENTIAL METHOD BLD: ABNORMAL
EOSINOPHIL # BLD: 0.1 K/UL (ref 0–0.4)
EOSINOPHIL NFR BLD: 1 % (ref 0–7)
ERYTHROCYTE [DISTWIDTH] IN BLOOD BY AUTOMATED COUNT: 12.9 % (ref 11.5–14.5)
GLOBULIN SER CALC-MCNC: 4.2 G/DL (ref 2–4)
GLUCOSE SERPL-MCNC: 114 MG/DL (ref 65–100)
HCT VFR BLD AUTO: 41.4 % (ref 35–47)
HGB BLD-MCNC: 13.4 G/DL (ref 11.5–16)
IMM GRANULOCYTES # BLD AUTO: 0 K/UL (ref 0–0.04)
IMM GRANULOCYTES NFR BLD AUTO: 0 % (ref 0–0.5)
LYMPHOCYTES # BLD: 2.2 K/UL (ref 0.8–3.5)
LYMPHOCYTES NFR BLD: 24 % (ref 12–49)
MAGNESIUM SERPL-MCNC: 1.8 MG/DL (ref 1.6–2.4)
MCH RBC QN AUTO: 30.2 PG (ref 26–34)
MCHC RBC AUTO-ENTMCNC: 32.4 G/DL (ref 30–36.5)
MCV RBC AUTO: 93.5 FL (ref 80–99)
MONOCYTES # BLD: 1.1 K/UL (ref 0–1)
MONOCYTES NFR BLD: 12 % (ref 5–13)
NEUTS SEG # BLD: 5.6 K/UL (ref 1.8–8)
NEUTS SEG NFR BLD: 63 % (ref 32–75)
NRBC # BLD: 0 K/UL (ref 0–0.01)
NRBC BLD-RTO: 0 PER 100 WBC
P-R INTERVAL, ECG05: 150 MS
P-R INTERVAL, ECG05: 162 MS
PLATELET # BLD AUTO: 291 K/UL (ref 150–400)
PMV BLD AUTO: 8.6 FL (ref 8.9–12.9)
POTASSIUM SERPL-SCNC: 3.7 MMOL/L (ref 3.5–5.1)
PROT SERPL-MCNC: 7.8 G/DL (ref 6.4–8.2)
Q-T INTERVAL, ECG07: 366 MS
Q-T INTERVAL, ECG07: 378 MS
QRS DURATION, ECG06: 108 MS
QRS DURATION, ECG06: 96 MS
QTC CALCULATION (BEZET), ECG08: 446 MS
QTC CALCULATION (BEZET), ECG08: 481 MS
RBC # BLD AUTO: 4.43 M/UL (ref 3.8–5.2)
SAMPLES BEING HELD,HOLD: NORMAL
SODIUM SERPL-SCNC: 132 MMOL/L (ref 136–145)
TROPONIN-HIGH SENSITIVITY: 19 NG/L (ref 0–51)
TROPONIN-HIGH SENSITIVITY: 26 NG/L (ref 0–51)
TROPONIN-HIGH SENSITIVITY: 28 NG/L (ref 0–51)
TROPONIN-HIGH SENSITIVITY: 34 NG/L (ref 0–51)
TSH SERPL DL<=0.05 MIU/L-ACNC: 1.49 UIU/ML (ref 0.36–3.74)
VENTRICULAR RATE, ECG03: 104 BPM
VENTRICULAR RATE, ECG03: 84 BPM
WBC # BLD AUTO: 9.2 K/UL (ref 3.6–11)

## 2021-12-19 PROCEDURE — 74011250637 HC RX REV CODE- 250/637: Performed by: EMERGENCY MEDICINE

## 2021-12-19 PROCEDURE — 85025 COMPLETE CBC W/AUTO DIFF WBC: CPT

## 2021-12-19 PROCEDURE — 84484 ASSAY OF TROPONIN QUANT: CPT

## 2021-12-19 PROCEDURE — 80053 COMPREHEN METABOLIC PANEL: CPT

## 2021-12-19 PROCEDURE — 74011250637 HC RX REV CODE- 250/637: Performed by: INTERNAL MEDICINE

## 2021-12-19 PROCEDURE — 71045 X-RAY EXAM CHEST 1 VIEW: CPT

## 2021-12-19 PROCEDURE — G0378 HOSPITAL OBSERVATION PER HR: HCPCS

## 2021-12-19 PROCEDURE — 74011250637 HC RX REV CODE- 250/637: Performed by: FAMILY MEDICINE

## 2021-12-19 PROCEDURE — 84443 ASSAY THYROID STIM HORMONE: CPT

## 2021-12-19 PROCEDURE — 83735 ASSAY OF MAGNESIUM: CPT

## 2021-12-19 PROCEDURE — 99285 EMERGENCY DEPT VISIT HI MDM: CPT

## 2021-12-19 PROCEDURE — 36415 COLL VENOUS BLD VENIPUNCTURE: CPT

## 2021-12-19 PROCEDURE — 93005 ELECTROCARDIOGRAM TRACING: CPT

## 2021-12-19 RX ORDER — TRAMADOL HYDROCHLORIDE 50 MG/1
50 TABLET ORAL
Status: DISCONTINUED | OUTPATIENT
Start: 2021-12-19 | End: 2021-12-29 | Stop reason: HOSPADM

## 2021-12-19 RX ORDER — ATORVASTATIN CALCIUM 40 MG/1
40 TABLET, FILM COATED ORAL
Status: DISCONTINUED | OUTPATIENT
Start: 2021-12-20 | End: 2021-12-29 | Stop reason: HOSPADM

## 2021-12-19 RX ORDER — LISINOPRIL 5 MG/1
5 TABLET ORAL
Status: DISCONTINUED | OUTPATIENT
Start: 2021-12-19 | End: 2021-12-19

## 2021-12-19 RX ORDER — ACETAMINOPHEN 325 MG/1
650 TABLET ORAL
Status: DISCONTINUED | OUTPATIENT
Start: 2021-12-19 | End: 2021-12-29 | Stop reason: HOSPADM

## 2021-12-19 RX ORDER — ONDANSETRON 4 MG/1
4 TABLET, ORALLY DISINTEGRATING ORAL
Status: DISCONTINUED | OUTPATIENT
Start: 2021-12-19 | End: 2021-12-29 | Stop reason: HOSPADM

## 2021-12-19 RX ORDER — POLYETHYLENE GLYCOL 3350 17 G/17G
17 POWDER, FOR SOLUTION ORAL DAILY PRN
Status: DISCONTINUED | OUTPATIENT
Start: 2021-12-19 | End: 2021-12-29 | Stop reason: HOSPADM

## 2021-12-19 RX ORDER — HYDRALAZINE HYDROCHLORIDE 25 MG/1
25 TABLET, FILM COATED ORAL
Status: COMPLETED | OUTPATIENT
Start: 2021-12-19 | End: 2021-12-19

## 2021-12-19 RX ORDER — LISINOPRIL 20 MG/1
20 TABLET ORAL DAILY
COMMUNITY
End: 2021-12-29

## 2021-12-19 RX ORDER — NITROGLYCERIN 0.4 MG/1
0.4 TABLET SUBLINGUAL AS NEEDED
Status: DISCONTINUED | OUTPATIENT
Start: 2021-12-19 | End: 2021-12-29 | Stop reason: HOSPADM

## 2021-12-19 RX ORDER — ATORVASTATIN CALCIUM 10 MG/1
10 TABLET, FILM COATED ORAL
Status: DISCONTINUED | OUTPATIENT
Start: 2021-12-19 | End: 2021-12-19

## 2021-12-19 RX ORDER — ONDANSETRON 2 MG/ML
4 INJECTION INTRAMUSCULAR; INTRAVENOUS
Status: DISCONTINUED | OUTPATIENT
Start: 2021-12-19 | End: 2021-12-29 | Stop reason: HOSPADM

## 2021-12-19 RX ORDER — LISINOPRIL 10 MG/1
20 TABLET ORAL
Status: COMPLETED | OUTPATIENT
Start: 2021-12-19 | End: 2021-12-19

## 2021-12-19 RX ORDER — ASPIRIN 325 MG
325 TABLET ORAL
Status: COMPLETED | OUTPATIENT
Start: 2021-12-19 | End: 2021-12-19

## 2021-12-19 RX ORDER — LOSARTAN POTASSIUM 50 MG/1
50 TABLET ORAL EVERY EVENING
Status: DISCONTINUED | OUTPATIENT
Start: 2021-12-19 | End: 2021-12-19

## 2021-12-19 RX ORDER — CARVEDILOL 12.5 MG/1
12.5 TABLET ORAL 2 TIMES DAILY WITH MEALS
Status: DISCONTINUED | OUTPATIENT
Start: 2021-12-19 | End: 2021-12-20

## 2021-12-19 RX ORDER — ACETAMINOPHEN 650 MG/1
650 SUPPOSITORY RECTAL
Status: DISCONTINUED | OUTPATIENT
Start: 2021-12-19 | End: 2021-12-29 | Stop reason: HOSPADM

## 2021-12-19 RX ORDER — SODIUM CHLORIDE 0.9 % (FLUSH) 0.9 %
5-40 SYRINGE (ML) INJECTION EVERY 8 HOURS
Status: DISCONTINUED | OUTPATIENT
Start: 2021-12-19 | End: 2021-12-29 | Stop reason: HOSPADM

## 2021-12-19 RX ORDER — LEVOTHYROXINE SODIUM 75 UG/1
75 TABLET ORAL
Status: ACTIVE | OUTPATIENT
Start: 2021-12-19 | End: 2021-12-19

## 2021-12-19 RX ORDER — SODIUM CHLORIDE 0.9 % (FLUSH) 0.9 %
5-40 SYRINGE (ML) INJECTION AS NEEDED
Status: DISCONTINUED | OUTPATIENT
Start: 2021-12-19 | End: 2021-12-29 | Stop reason: HOSPADM

## 2021-12-19 RX ORDER — PANTOPRAZOLE SODIUM 40 MG/1
40 TABLET, DELAYED RELEASE ORAL
Status: DISCONTINUED | OUTPATIENT
Start: 2021-12-19 | End: 2021-12-29 | Stop reason: HOSPADM

## 2021-12-19 RX ORDER — ATORVASTATIN CALCIUM 10 MG/1
10 TABLET, FILM COATED ORAL DAILY
Status: DISCONTINUED | OUTPATIENT
Start: 2021-12-19 | End: 2021-12-19

## 2021-12-19 RX ORDER — LOSARTAN POTASSIUM 50 MG/1
100 TABLET ORAL EVERY EVENING
Status: DISCONTINUED | OUTPATIENT
Start: 2021-12-19 | End: 2021-12-21

## 2021-12-19 RX ORDER — FUROSEMIDE 40 MG/1
20 TABLET ORAL
Status: COMPLETED | OUTPATIENT
Start: 2021-12-19 | End: 2021-12-19

## 2021-12-19 RX ADMIN — TRAMADOL HYDROCHLORIDE 50 MG: 50 TABLET, COATED ORAL at 23:37

## 2021-12-19 RX ADMIN — LISINOPRIL 20 MG: 10 TABLET ORAL at 10:11

## 2021-12-19 RX ADMIN — LOSARTAN POTASSIUM 100 MG: 50 TABLET, FILM COATED ORAL at 17:11

## 2021-12-19 RX ADMIN — APIXABAN 5 MG: 5 TABLET, FILM COATED ORAL at 10:11

## 2021-12-19 RX ADMIN — Medication 10 ML: at 18:00

## 2021-12-19 RX ADMIN — ASPIRIN 325 MG ORAL TABLET 325 MG: 325 PILL ORAL at 07:28

## 2021-12-19 RX ADMIN — FUROSEMIDE 20 MG: 40 TABLET ORAL at 10:11

## 2021-12-19 RX ADMIN — CARVEDILOL 12.5 MG: 12.5 TABLET, FILM COATED ORAL at 17:11

## 2021-12-19 RX ADMIN — Medication 10 ML: at 22:00

## 2021-12-19 RX ADMIN — PANTOPRAZOLE SODIUM 40 MG: 40 TABLET, DELAYED RELEASE ORAL at 17:53

## 2021-12-19 RX ADMIN — HYDRALAZINE HYDROCHLORIDE 25 MG: 25 TABLET, FILM COATED ORAL at 14:50

## 2021-12-19 NOTE — ED NOTES
Report from [facility], [address] [facility]. Pt. Resting with eyes closed.

## 2021-12-19 NOTE — ED NOTES
TRANSFER - OUT REPORT:    Verbal report given to THAI Kelly (name) on Hal Litten  being transferred to [place] [address](unit) for routine progression of care       Report consisted of patients Situation, Background, Assessment and   Recommendations(SBAR). Information from the following report(s) ED Summary was reviewed with the receiving nurse. Lines:   Peripheral IV 12/19/21 Right Hand (Active)   Site Assessment Clean, dry, & intact 12/19/21 0216   Phlebitis Assessment 0 12/19/21 0216   Infiltration Assessment 0 12/19/21 0216   Dressing Status Clean, dry, & intact 12/19/21 0216   Dressing Type Transparent 12/19/21 0216   Hub Color/Line Status Pink;Patent; Flushed 12/19/21 0216   Action Taken Blood drawn 12/19/21 0216   Alcohol Cap Used Yes 12/19/21 0216       Peripheral IV 12/19/21 Left Antecubital (Active)   Site Assessment Clean, dry, & intact 12/19/21 0218   Phlebitis Assessment 0 12/19/21 0218   Infiltration Assessment 0 12/19/21 0218   Dressing Status Clean, dry, & intact 12/19/21 0218   Action Taken Blood drawn 12/19/21 8883        Opportunity for questions and clarification was provided. Patient transported with: Anton

## 2021-12-19 NOTE — ED PROVIDER NOTES
81-year-old female presenting ER with report of chest pain and palpitations. Patient reports symptoms started approximately 2 hours prior to arrival.  Patient denied any associated symptoms of shortness of breath dizziness lightheadedness diaphoresis or nausea vomiting. Patient does have history of Takotsubo's, cardiomyopathy and history of A. fib with history of ablation. Patient currently on Lasix and takes metoprolol and lisinopril and eliquis. Upon arrival patient reporting symptoms resolving and shortly after arrival patient's symptoms did resolve. Initial EKG showed a run of wide-complex QRSs 7 beats which could be PVCs versus A. fib with aberrancy as it was irregular in rhythm.              Past Medical History:   Diagnosis Date    Arrhythmia     attempted ablation    Arthritis     GERD (gastroesophageal reflux disease)     High cholesterol     History of seasonal allergies     Hypertension     Sleep apnea     mild-does not use CPAP    Thyroid disease        Past Surgical History:   Procedure Laterality Date    COLONOSCOPY N/A 10/14/2020    COLONOSCOPY    :- [facility] S[facility] at [facility] END[facility]COPY    HX BREAST BIOPSY Right Years ago    Negative    HX CATARACT REMOVAL      bilateral    HX OTHER SURGICAL      cyst removed from left cheek    WI BREAST SURGERY PROCEDURE UNLISTED      breast biopsy-local - benign    [place] CARDIAC SURG PROCEDURE UNLIST      attempted ablation         Family History:   Problem Relation Age of Onset    Other Mother         multiple myeloma    Stroke Father     Heart Disease Brother         ablation       Social History     Socioeconomic History    Marital status:      Spouse name: Not on file    Number of children: Not on file    Years of education: Not on file    Highest education level: Not on file   Occupational History    Not on file   Tobacco Use    Smoking status: Former Smoker     Years: 10.00     Quit date: 5/23/1974     Years since quittin.6    Smokeless tobacco: Never Used   Substance and Sexual Activity    Alcohol use: Yes     Alcohol/week: 0.0 standard drinks     Types: 3 - 4 Glasses of wine per week    Drug use: No    Sexual activity: Not on file   Other Topics Concern    Not on file   Social History Narrative    Not on file     Social Determinants of Health     Financial Resource Strain:     Difficulty of Paying Living Expenses: Not on file   Food Insecurity:     Worried About Running Out of Food in the Last Year: Not on file    Geoff of Food in the Last Year: Not on file   Transportation Needs:     Lack of Transportation (Medical): Not on file    Lack of Transportation (Non-Medical): Not on file   Physical Activity:     Days of Exercise per Week: Not on file    Minutes of Exercise per Session: Not on file   Stress:     Feeling of Stress : Not on file   Social Connections:     Frequency of Communication with Friends and Family: Not on file    Frequency of Social Gatherings with Friends and Family: Not on file    Attends Hoahaoism Services: Not on file    Active Member of [address] [place] [address] or Organizations: Not on file    Attends Club or Organization Meetings: Not on file    Marital Status: Not on file   Intimate Partner Violence:     Fear of Current or Ex-Partner: Not on file    Emotionally Abused: Not on file    Physically Abused: Not on file    Sexually Abused: Not on file   Housing Stability:     Unable to Pay for Housing in the Last Year: Not on file    Number of [place] in the Last Year: Not on file    Unstable Housing in the Last Year: Not on file         ALLERGIES: Patient has no known allergies. Review of Systems   Constitutional: Negative for chills and fever. HENT: Negative for congestion and sore throat. Eyes: Negative for pain. Respiratory: Negative for shortness of breath. Cardiovascular: Positive for chest pain and palpitations.    Gastrointestinal: Negative for abdominal pain, diarrhea, nausea and vomiting. Genitourinary: Negative for dysuria and flank pain. Musculoskeletal: Negative for back pain and neck pain. Skin: Negative for rash. Neurological: Negative for dizziness, light-headedness and headaches. All other systems reviewed and are negative. Vitals:    12/19/21 0506 12/19/21 0521 12/19/21 0536 12/19/21 0551   BP: (!) 170/99 (!) 154/82 (!) 151/83 (!) 173/88   Pulse: 63 (!) 59 61 65   Resp:       Temp:       SpO2: 95% 93% 94% 96%   Weight:       Height:                Physical Exam  Constitutional:       Appearance: She is well-developed. HENT:      Head: Normocephalic. Eyes:      Conjunctiva/sclera: Conjunctivae normal.   Cardiovascular:      Rate and Rhythm: Normal rate and regular rhythm. Heart sounds: No murmur heard. Pulmonary:      Effort: Pulmonary effort is normal. No respiratory distress. Breath sounds: Normal breath sounds. Abdominal:      General: Bowel sounds are normal.      Palpations: Abdomen is soft. Tenderness: There is no abdominal tenderness. Musculoskeletal:         General: Normal range of motion. Cervical back: Normal range of motion and neck supple. Skin:     General: Skin is warm. Capillary Refill: Capillary refill takes less than 2 seconds. Findings: No rash. Neurological:      Mental Status: She is alert and oriented to person, place, and time. Comments: No gross motor or sensory deficits          MDM  Number of Diagnoses or Management Options  Chest pain, unspecified type  Palpitations  Diagnosis management comments: Patient presenting ER with complaint of chest pain and palpitations. EKG had a 7 beat episode of wide-complex QRS that was irregular in rhythm. A. fib with aberrancy versus short run of V. Tach/PVCs. Since that time patient has been in normal sinus rhythm. Currently chest pain-free. Presented here with over the blood pressure. Blood pressure is improved.   Troponin continuing to trend upwards. Spoke with patient's cardiologist recommending hospitalization due to patient's continued troponin increase. Given aspirin. Total critical care time spent exclusive of procedures:  30min         Amount and/or Complexity of Data Reviewed  Clinical lab tests: reviewed  Tests in the radiology section of CPT®: reviewed  Tests in the medicine section of CPT®: reviewed  Decide to obtain previous medical records or to obtain history from someone other than the patient: yes      ED Course as of 12/19/21 0623   Sun Dec 19, 2021   0200 EKG sinus tachycardia rate of 104 beats per minute, with part of the rhythm showing normal sinus rhythm and beginning part showing an irregular rhythm with a run of wide complex QRS, 7 beats of PVCs versus A. fib with aberrancy. No ST elevations or depressions. Normal QTc interval.  EKG interpreted by Eric De La Cruz MD   [ZD]   0192 Spoke with patient's cardiologist on-call regarding patient's history and symptoms and troponin levels. Recommending repeat troponin if no significant increase or unchanged can be safely discharged home with follow-up. [ZD]   9796 Troponin-High Sensitivity: 34  Patient's troponin continued to rise. Based on conversation with cardiology recommending hospitalization [ZD]      ED Course User Index  [ZD] Cheryl Gautam MD       Procedures        Perfect Serve Consult for Admission  6:18 AM    ED Room Number: SER06/06  Patient Name and age:  Shannon Stafford 80 y.o.  female  Working Diagnosis:   1. Chest pain, unspecified type    2. Palpitations        COVID-19 Suspicion:  no  Sepsis present:  no  Reassessment needed: no  Code Status:  Full Code  Readmission: no  Isolation Requirements:  no  Recommended Level of Care:  telemetry  Department:[place] [facility] - [phone]  Other: Troponin continue to trend upward. Currently chest pain-free.   Spoke with patient's cardiologist, recommending hospitalization since patient troponin continues to trend upwards. Recent Results (from the past 24 hour(s))   EKG, 12 LEAD, INITIAL    Collection Time: 12/19/21  1:52 AM   Result Value Ref Range    Ventricular Rate 84 BPM    Atrial Rate 84 BPM    P-R Interval 150 ms    QRS Duration 108 ms    Q-T Interval 378 ms    QTC Calculation (Bezet) 446 ms    Calculated P Axis 73 degrees    Calculated R Axis -22 degrees    Calculated T Axis 56 degrees    Diagnosis       Normal sinus rhythm  Normal ECG  When compared with ECG of 16-DEC-2021 23:36,  No significant change was found     CBC WITH AUTOMATED DIFF    Collection Time: 12/19/21  1:57 AM   Result Value Ref Range    WBC 9.2 3.6 - 11.0 K/uL    RBC 4.43 3.80 - 5.20 M/uL    HGB 13.4 11.5 - 16.0 g/dL    HCT 41.4 35.0 - 47.0 %    MCV 93.5 80.0 - 99.0 FL    MCH 30.2 26.0 - 34.0 PG    MCHC 32.4 30.0 - 36.5 g/dL    RDW 12.9 11.5 - 14.5 %    PLATELET 190 641 - 563 K/uL    MPV 8.6 (L) 8.9 - 12.9 FL    NRBC 0.0 0  WBC    ABSOLUTE NRBC 0.00 0.00 - 0.01 K/uL    NEUTROPHILS 63 32 - 75 %    LYMPHOCYTES 24 12 - 49 %    MONOCYTES 12 5 - 13 %    EOSINOPHILS 1 0 - 7 %    BASOPHILS 0 0 - 1 %    IMMATURE GRANULOCYTES 0 0.0 - 0.5 %    ABS. NEUTROPHILS 5.6 1.8 - 8.0 K/UL    ABS. LYMPHOCYTES 2.2 0.8 - 3.5 K/UL    ABS. MONOCYTES 1.1 (H) 0.0 - 1.0 K/UL    ABS. EOSINOPHILS 0.1 0.0 - 0.4 K/UL    ABS. BASOPHILS 0.0 0.0 - 0.1 K/UL    ABS. IMM.  GRANS. 0.0 0.00 - 0.04 K/UL    DF AUTOMATED     METABOLIC PANEL, COMPREHENSIVE    Collection Time: 12/19/21  1:57 AM   Result Value Ref Range    Sodium 132 (L) 136 - 145 mmol/L    Potassium 3.7 3.5 - 5.1 mmol/L    Chloride 95 (L) 97 - 108 mmol/L    CO2 28 21 - 32 mmol/L    Anion gap 9 5 - 15 mmol/L    Glucose 114 (H) 65 - 100 mg/dL    BUN 20 6 - 20 MG/DL    Creatinine 0.96 0.55 - 1.02 MG/DL    BUN/Creatinine ratio 21 (H) 12 - 20      GFR est AA >60 >60 ml/min/1.73m2    GFR est non-AA 56 (L) >60 ml/min/1.73m2    Calcium 9.0 8.5 - 10.1 MG/DL    Bilirubin, total 0.6 0.2 - 1.0 MG/DL    ALT (SGPT) 24 12 - 78 U/L    AST (SGOT) 19 15 - 37 U/L    Alk. phosphatase 81 45 - 117 U/L    Protein, total 7.8 6.4 - 8.2 g/dL    Albumin 3.6 3.5 - 5.0 g/dL    Globulin 4.2 (H) 2.0 - 4.0 g/dL    A-G Ratio 0.9 (L) 1.1 - 2.2     TROPONIN-HIGH SENSITIVITY    Collection Time: 12/19/21  1:57 AM   Result Value Ref Range    Troponin-High Sensitivity 19 0 - 51 ng/L   MAGNESIUM    Collection Time: 12/19/21  1:57 AM   Result Value Ref Range    Magnesium 1.8 1.6 - 2.4 mg/dL   SAMPLES BEING HELD    Collection Time: 12/19/21  1:58 AM   Result Value Ref Range    SAMPLES BEING HELD red,blue     COMMENT        Add-on orders for these samples will be processed based on acceptable specimen integrity and analyte stability, which may vary by analyte. TROPONIN-HIGH SENSITIVITY    Collection Time: 12/19/21  3:52 AM   Result Value Ref Range    Troponin-High Sensitivity 26 0 - 51 ng/L   TROPONIN-HIGH SENSITIVITY    Collection Time: 12/19/21  5:44 AM   Result Value Ref Range    Troponin-High Sensitivity 34 0 - 51 ng/L       XR CHEST PORT    Result Date: 12/19/2021  INDICATION: CP EXAM:  AP CHEST RADIOGRAPH COMPARISON: December 17, 2021 FINDINGS: AP portable view of the chest demonstrates a normal cardiomediastinal silhouette. There is no edema, effusion, consolidation, or pneumothorax. The osseous structures are unremarkable. No acute process.

## 2021-12-19 NOTE — ED TRIAGE NOTES
Triage note:intermittent mid chest pain x 2 hours felt her heart was racing. symptoms have resolved now.

## 2021-12-19 NOTE — CONSULTS
[address] [facility] Cardiology Consultation    Date of Consult:  12/19/21  Date of Admission: 12/19/2021  Primary Cardiologist: Dr. Samina Mosley  Physician Requesting consult: Dr. Aleisha Sales    Chief Complaint / Reason for Consult:   Chest pain    History of Present Illness:  Miquel Gibbs is a 80 y.o. female with the below listed medical history who was admitted with chest pain. She presented to [facility] with complaints of chest pain and palpitations. Symptoms started approximately 2 hours prior to arrival to [facility]. No associated dyspnea, LH, diaphoresis, nausea. Her symptoms were already improving on arrival and resolved completely spontaneously while in the ED. ECG showed sinus rhythm with PVCs and likely consecutive PVCs. HS troponin trended from 15 to 19 to 26 to 34. She remained asymptomatic, however, due to the increasing HS troponin, she was admitted for observation. On arrival to [facility], she remained symptom free. She was also in the ED a few days ago due to uncontrolled HTN. Has had some mild ankle edema over the last few days. H/o Takotsubo cardiomyopathy in 4/2019. I personally reviewed her echo and cath films. These showed minimal non-obstructive CAD with wall motion c/w classical Takotsubo cardiomyopathy. Last TTE was at [facility] 8/2/19 showed EF 50-55%, mild MR. Past Medical History:   Diagnosis Date    Arrhythmia     attempted ablation    Arthritis     GERD (gastroesophageal reflux disease)     High cholesterol     History of seasonal allergies     Hypertension     Sleep apnea     mild-does not use CPAP    Thyroid disease        Prior to Admission medications    Medication Sig Start Date End Date Taking? Authorizing Provider   furosemide (Lasix) 20 mg tablet Take 1 Tablet by mouth daily. 12/17/21  Yes Taryn Hester MD   apixaban (Eliquis) 5 mg tablet Take 5 mg by mouth two (2) times a day.    Yes Rosa Wall MD   traMADoL (ULTRAM) 50 mg tablet Take 50 mg by mouth every six (6) hours as needed for Pain. Yes Other, MD Rosa   metoprolol tartrate (LOPRESSOR) 25 mg tablet Take 50 mg by mouth every evening. Yes Mae, MD Rosa   atorvastatin (LIPITOR) 10 mg tablet Take 40 mg by mouth daily. Yes Mae, MD Rosa   lisinopril (PRINIVIL, ZESTRIL) 5 mg tablet Take 1 Tab by mouth daily. Patient taking differently: Take 20 mg by mouth daily. 4/10/19  Yes Nic Castaneda MD   levothyroxine (SYNTHROID) 75 mcg tablet Take 75 mcg by mouth Daily (before breakfast). Yes Provider, Historical   co-enzyme Q-10 (COQ-10) 100 mg capsule Take 100 mg by mouth daily. Yes Provider, Historical       Current Facility-Administered Medications   Medication Dose Route Frequency    nitroglycerin (NITROSTAT) tablet 0.4 mg  0.4 mg SubLINGual PRN    levothyroxine (SYNTHROID) tablet 75 mcg  75 mcg Oral NOW    atorvastatin (LIPITOR) tablet 10 mg  10 mg Oral DAILY       Family History   Problem Relation Age of Onset    Other Mother         multiple myeloma    Stroke Father     Heart Disease Brother         ablation       Social History     Socioeconomic History    Marital status:      Spouse name: Not on file    Number of children: Not on file    Years of education: Not on file    Highest education level: Not on file   Occupational History    Not on file   Tobacco Use    Smoking status: Former Smoker     Years: 10.00     Quit date: 1974     Years since quittin.6    Smokeless tobacco: Never Used   Substance and Sexual Activity    Alcohol use:  Yes     Alcohol/week: 0.0 standard drinks     Types: 3 - 4 Glasses of wine per week    Drug use: No    Sexual activity: Not on file   Other Topics Concern    Not on file   Social History Narrative    Not on file     Social Determinants of Health     Financial Resource Strain:     Difficulty of Paying Living Expenses: Not on file   Food Insecurity:     Worried About Running Out of Food in the Last Year: Not on file    [address] in the Last Year: Not on file   Transportation Needs:     Lack of Transportation (Medical): Not on file    Lack of Transportation (Non-Medical): Not on file   Physical Activity:     Days of Exercise per Week: Not on file    Minutes of Exercise per Session: Not on file   Stress:     Feeling of Stress : Not on file   Social Connections:     Frequency of Communication with Friends and Family: Not on file    Frequency of Social Gatherings with Friends and Family: Not on file    Attends Zoroastrianism Services: Not on file    Active Member of [address] [place] [address] [email or web address] or Organizations: Not on file    Attends Club or Organization Meetings: Not on file    Marital Status: Not on file   Intimate Partner Violence:     Fear of Current or Ex-Partner: Not on file    Emotionally Abused: Not on file    Physically Abused: Not on file    Sexually Abused: Not on file   Housing Stability:     Unable to Pay for Housing in the Last Year: Not on file    Number of [place] in the Last Year: Not on file    Unstable Housing in the Last Year: Not on file       Review of Systems   All other systems reviewed and are negative.       Visit Vitals  BP (!) 184/103   Pulse 67   Temp 98 °F (36.7 °C)   Resp 18   Ht 5' 8\" (1.727 m)   Wt 95.6 kg (210 lb 12.2 oz)   SpO2 98%   BMI 32.05 kg/m²       No intake or output data in the 24 hours ending 12/19/21 1630     Physical Exam  GEN: NAD, appears stated age  [de-identified]: EOMI, MMM, OP clear  NECK: Normal JVP, carotids 2+ b/l and symmetrical  CV: RRR, normal S1 and S2, no M/R/G  LUNGS: CTAB, no W/R/R  ABD: NABS, soft, NT/ND  EXT: No edema, 2+ and symmetrical radial pulses b/l  PSYCH: Mood and affect normal  NEURO: AAO, MAEW, face symmetrical, speech intact    Lab Review:  BMP:   Lab Results   Component Value Date/Time     (L) 12/19/2021 01:57 AM    K 3.7 12/19/2021 01:57 AM    CL 95 (L) 12/19/2021 01:57 AM    CO2 28 12/19/2021 01:57 AM    AGAP 9 12/19/2021 01:57 AM     (H) 12/19/2021 01:57 AM    BUN 20 12/19/2021 01:57 AM    CREA 0.96 12/19/2021 01:57 AM    GFRAA >60 12/19/2021 01:57 AM    GFRNA 56 (L) 12/19/2021 01:57 AM        CBC:  Lab Results   Component Value Date/Time    WBC 9.2 12/19/2021 01:57 AM    HGB 13.4 12/19/2021 01:57 AM    HCT 41.4 12/19/2021 01:57 AM    PLATELET 154 86/26/0916 01:57 AM    MCV 93.5 12/19/2021 01:57 AM       All Cardiac Markers in the last 24 hours:  No results found for: CPK, CK, CKMMB, CKMB, RCK3, CKMBT, CKMBPOC, CKNDX, CKND1, PIO, TROPT, TROIQ, HEATHER, TROPT, TNIPOC, BNP, BNPP, BNPNT    No results found for: CHOL, CHOLPOCT, CHOLX, CHLST, CHOLV, HDL, HDLPOC, HDLP, LDL, LDLCPOC, LDLC, DLDLP, VLDLC, VLDL, TGLX, TRIGL, TRIGP, TGLPOCT, CHHD, CHHDX     Data Review:  ECG tracing personally reviewed: As above    Echocardiogram:  04/07/19    ECHO ADULT COMPLETE 04/08/2019 4/8/2019    Interpretation Summary  · Left ventricular moderate-to-severely decreased segmental systolic function. Estimated left ventricular ejection fraction is 31 - 35%. Left ventricular cavity size is moderately dilated. Abnormal left ventricular strain. · Mild aortic valve sclerosis. · Mild pulmonary hypertension is present. Signed by: Beckie Morgan MD on 4/8/2019  3:09 PM    Other cardiac testing: Cath as above    Other imaging:  CXR: \"No acute process\"    Assessment:    1. Chest pain   - Likely related to uncontrolled HTN   - Will do stress test to risk stratify  2. Palpitations  3. Troponin normal, but trending up   - Increased >20%  4. H/o Takotsubo cardiomyopathy   - EF back to 50-55% on 8/2/19 TTE (VCS)  5. PVCs  6. pAF   - Says she went for AF ablation with Dr. Francisco Javier Valdez in the 1990s, but no AF could be induced so she says the ablation was not done   - No history of major bleeding or TIA/CVA  7.  Hypertensive urgency     Recommendations / Plan:  - Given troponin trend and initial presentation with several hours of chest pain and frequent PVCs, recommend Lexiscan MPI -- ordered for tomorrow  - Repeat TTE; if can't be completed in a timely fashion inpatient, then can do as an outpatient  - Change lisinopril to losartan 100 mg qPM  - Change metoprolol to carvedilol 12.5 mg q12h  - Renal Duplex U/S to evaluate for LETICIA  - Continue Eliquis; ok to hold for 4 days in the future for injection (for pain)  - Trend troponin to peak  - Telemetry  - Please keep NPO after MN  - Continue outpatient CV medications  - Discussed with Dr. Katherine Callahan, hospitalist physician  - Will sign out to primary cardiologist, Dr. Ana Cristina Heath, who will assume CV care tomorrow    Thank you for the opportunity to participate in the care of Teddy Grove and please do not hesitate to contact us should you have any questions. Signed:  Victoria Rice, [address] [place] [address] / [address] [place] [address] [facility]  12/19/21

## 2021-12-19 NOTE — H&P
[facility]  History and Physical    Primary Care Provider: Bharath Norris MD  Date of Service:  12/19/2021  Patient's cardiologist is Dr. Bello Hill at [address] [facility]  Chief Complaint: Chest pain    Subjective:     Cam Canales is a 80 y.o. female who presents a second episode of chest pain. Her first episode of chest pain was on 12/16/2021-she was evaluated in [facility] found to have mild lower extremity swelling and uncontrolled hypertension with systolic blood pressures in the 200s-at that time her heart enzymes were unremarkable her EKG negative for MI and patient was sent home on Lasix. She took Lasix Friday and Saturday-and woke up again at 1 AM this morning with severe chest pain-8 out of 10 midsternal in location nonradiating--this pain lasted for about 15 or 20 minutes she did not  have any nitroglycerin to try-she did take some Tylenol and tramadol-she returned to the [facility]-her systolic blood pressures were again in the 200s and although her troponins were in the normal range because of the rate of increase so she was referred to [facility] for hospitalist admission and cardiology evaluation. Patient denies any fevers and chills she denies any respiratory symptoms she denies any GI symptoms-no known sick contacts-   She has not had any other recent medication changes other than the 2 days of Lasix. She does admit to some issues with acid reflux both recently and in the past she takes an over-the-counter acid  suppressant medication-she has never had an endoscopy and has no personal or family history of peptic ulcer disease. Review of Systems:    A comprehensive review of systems was negative except for that written in the History of Present Illness.      Past Medical History:   Diagnosis Date    Arrhythmia     attempted ablation    Arthritis     GERD (gastroesophageal reflux disease)     High cholesterol     History of seasonal allergies     Hypertension     Sleep apnea     mild-does not use CPAP    Thyroid disease    Patient also has a history of degenerative joint disease, paroxysmal SVT, paroxysmal A. Fib, Takotsubo's cardiomyopathy-  Spinal stenosis,     Past Surgical History:   Procedure Laterality Date    COLONOSCOPY N/A 10/14/2020    COLONOSCOPY    :- performed by Sarai Santizo [facility] at [facility] ENDOSCOPY    HX BREAST BIOPSY Right Years ago    Negative    HX CATARACT REMOVAL      bilateral    HX OTHER SURGICAL      cyst removed from left cheek    DE BREAST SURGERY PROCEDURE UNLISTED      breast biopsy-local - benign    DE CARDIAC SURG PROCEDURE UNLIST      attempted ablation   Patient has had numerous steroid joint injections the most recent of which was approximately 3 weeks ago    Prior to Admission medications    Medication Sig Start Date End Date Taking? Authorizing Provider   lisinopriL (PRINIVIL, ZESTRIL) 20 mg tablet Take 20 mg by mouth daily. Yes Provider, Historical   furosemide (Lasix) 20 mg tablet Take 1 Tablet by mouth daily. 12/17/21  Yes Tara Dee MD   apixaban (Eliquis) 5 mg tablet Take 5 mg by mouth two (2) times a day. Yes Other, MD Rosa   traMADoL (ULTRAM) 50 mg tablet Take 50 mg by mouth every six (6) hours as needed for Pain. Yes Other, MD Rosa   metoprolol tartrate (LOPRESSOR) 50 mg tablet Take 50 mg by mouth every evening. Yes Other, MD Rosa   atorvastatin (LIPITOR) 40 mg tablet Take 40 mg by mouth daily. Yes Other, MD Rosa   levothyroxine (SYNTHROID) 75 mcg tablet Take 75 mcg by mouth Daily (before breakfast). Yes Provider, Historical   co-enzyme Q-10 (COQ-10) 100 mg capsule Take 100 mg by mouth daily.    Yes Provider, Historical       No Known Allergies   Family History   Problem Relation Age of Onset    Other Mother         multiple myeloma    Stroke Father     Heart Disease Brother         ablation        SOCIAL HISTORY:  Patient resides at home with her  who is cognitively impaired-she is a retired school nurse  Patient ambulates independently  Smoking history: Essentially none-patient admits smoking cigarettes were short time in 1974  Alcohol history: None        Objective:     Patient Vitals for the past 24 hrs:   Temp Pulse Resp BP SpO2   12/19/21 1800  71      12/19/21 1525 97.9 °F (36.6 °C) 67 18 (!) 151/83 98 %   12/19/21 1347 98 °F (36.7 °C) 65  (!) 184/103 96 %   12/19/21 1151  62  (!) 190/72 94 %   12/19/21 0721  60  (!) 199/80 97 %   12/19/21 0706  (!) 55  (!) 163/77 93 %   12/19/21 0651  (!) 58  (!) 162/77 94 %   12/19/21 0636  (!) 59  (!) 174/81 94 %   12/19/21 0621    (!) 179/92    12/19/21 0551  65  (!) 173/88 96 %   12/19/21 0536  61  (!) 151/83 94 %   12/19/21 0521  (!) 59  (!) 154/82 93 %   12/19/21 0506  63  (!) 170/99 95 %   12/19/21 0451  65  (!) 179/86 96 %   12/19/21 0436  64  (!) 172/82 97 %   12/19/21 0421  61  (!) 162/76 97 %   12/19/21 0406  (!) 58   95 %   12/19/21 0329  (!) 59 17 (!) 145/70 97 %   12/19/21 0259  61 23 125/80    12/19/21 0229  67 17 131/87    12/19/21 0208  70  (!) 161/105    12/19/21 0151 97.7 °F (36.5 °C) 84 16 (!) 185/110 96 %     Physical Exam:   Visit Vitals  BP (!) 151/83 (BP 1 Location: Left upper arm, BP Patient Position: At rest)   Pulse 71   Temp 97.9 °F (36.6 °C)   Resp 18   Ht 5' 8\" (1.727 m)   Wt 95.6 kg (210 lb 12.2 oz)   SpO2 98%   BMI 32.05 kg/m²     General:  Alert, cooperative, no distress, appears stated age. Head:  Normocephalic, without obvious abnormality, atraumatic. Throat: Lips, mucosa, and tongue normal.    Neck: Supple, symmetrical, trachea midline, no adenopathy, thyroid: no enlargement/tenderness/nodules, no carotid bruit and no JVD. Lungs:   Clear to auscultation bilaterally. Chest wall:  No tenderness or deformity. Heart:  Regular rate and rhythm, S1, S2 normal, no murmur,    Abdomen:   Soft, non-tender.  Bowel sounds normal. No masses,     Extremities:  Trace edema , no cyanosis or clubbing, warm to the touch   Skin: Skin color, texture, turgor normal. No rashes or lesions. Lymph nodes: Cervical, supraclavicular  nodes normal.   Neurologic: CNII-XII intact. No focal neuro deficits   Psych: normal mood and affect. Data Review: All diagnostic labs and studies have been reviewed.     Recent Results (from the past 24 hour(s))   EKG, 12 LEAD, INITIAL    Collection Time: 12/19/21  1:52 AM   Result Value Ref Range    Ventricular Rate 84 BPM    Atrial Rate 84 BPM    P-R Interval 150 ms    QRS Duration 108 ms    Q-T Interval 378 ms    QTC Calculation (Bezet) 446 ms    Calculated P Axis 73 degrees    Calculated R Axis -22 degrees    Calculated T Axis 56 degrees    Diagnosis       Normal sinus rhythm  Normal ECG  When compared with ECG of 16-DEC-2021 23:36,  No significant change was found  Confirmed by Mary Majorr ([id number]) on 12/19/2021 3:37:08 PM     EKG, 12 LEAD, INITIAL    Collection Time: 12/19/21  1:52 AM   Result Value Ref Range    Ventricular Rate 104 BPM    Atrial Rate 73 BPM    P-R Interval 162 ms    QRS Duration 96 ms    Q-T Interval 366 ms    QTC Calculation (Bezet) 481 ms    Calculated P Axis 48 degrees    Calculated R Axis -19 degrees    Calculated T Axis 64 degrees    Diagnosis       Sinus rhythm with frequent and consecutive premature ventricular complexes  When compared with ECG of 19-DEC-2021 01:52,  premature ventricular complexes is a new finding  Confirmed by Mary Majorr ([id number]) on 12/19/2021 3:38:25 PM     CBC WITH AUTOMATED DIFF    Collection Time: 12/19/21  1:57 AM   Result Value Ref Range    WBC 9.2 3.6 - 11.0 K/uL    RBC 4.43 3.80 - 5.20 M/uL    HGB 13.4 11.5 - 16.0 g/dL    HCT 41.4 35.0 - 47.0 %    MCV 93.5 80.0 - 99.0 FL    MCH 30.2 26.0 - 34.0 PG    MCHC 32.4 30.0 - 36.5 g/dL    RDW 12.9 11.5 - 14.5 %    PLATELET 099 301 - 924 K/uL    MPV 8.6 (L) 8.9 - 12.9 FL    NRBC 0.0 0  WBC    ABSOLUTE NRBC 0.00 0.00 - 0.01 K/uL    NEUTROPHILS 63 32 - 75 %    LYMPHOCYTES 24 12 - 49 %    MONOCYTES 12 5 - 13 %    EOSINOPHILS 1 0 - 7 %    BASOPHILS 0 0 - 1 %    IMMATURE GRANULOCYTES 0 0.0 - 0.5 %    ABS. NEUTROPHILS 5.6 1.8 - 8.0 K/UL    ABS. LYMPHOCYTES 2.2 0.8 - 3.5 K/UL    ABS. MONOCYTES 1.1 (H) 0.0 - 1.0 K/UL    ABS. EOSINOPHILS 0.1 0.0 - 0.4 K/UL    ABS. BASOPHILS 0.0 0.0 - 0.1 K/UL    ABS. IMM. GRANS. 0.0 0.00 - 0.04 K/UL    DF AUTOMATED     METABOLIC PANEL, COMPREHENSIVE    Collection Time: 12/19/21  1:57 AM   Result Value Ref Range    Sodium 132 (L) 136 - 145 mmol/L    Potassium 3.7 3.5 - 5.1 mmol/L    Chloride 95 (L) 97 - 108 mmol/L    CO2 28 21 - 32 mmol/L    Anion gap 9 5 - 15 mmol/L    Glucose 114 (H) 65 - 100 mg/dL    BUN 20 6 - 20 MG/DL    Creatinine 0.96 0.55 - 1.02 MG/DL    BUN/Creatinine ratio 21 (H) 12 - 20      GFR est AA >60 >60 ml/min/1.73m2    GFR est non-AA 56 (L) >60 ml/min/1.73m2    Calcium 9.0 8.5 - 10.1 MG/DL    Bilirubin, total 0.6 0.2 - 1.0 MG/DL    ALT (SGPT) 24 12 - 78 U/L    AST (SGOT) 19 15 - 37 U/L    Alk. phosphatase 81 45 - 117 U/L    Protein, total 7.8 6.4 - 8.2 g/dL    Albumin 3.6 3.5 - 5.0 g/dL    Globulin 4.2 (H) 2.0 - 4.0 g/dL    A-G Ratio 0.9 (L) 1.1 - 2.2     TROPONIN-HIGH SENSITIVITY    Collection Time: 12/19/21  1:57 AM   Result Value Ref Range    Troponin-High Sensitivity 19 0 - 51 ng/L   MAGNESIUM    Collection Time: 12/19/21  1:57 AM   Result Value Ref Range    Magnesium 1.8 1.6 - 2.4 mg/dL   SAMPLES BEING HELD    Collection Time: 12/19/21  1:58 AM   Result Value Ref Range    SAMPLES BEING HELD red,blue     COMMENT        Add-on orders for these samples will be processed based on acceptable specimen integrity and analyte stability, which may vary by analyte.    TROPONIN-HIGH SENSITIVITY    Collection Time: 12/19/21  3:52 AM   Result Value Ref Range    Troponin-High Sensitivity 26 0 - 51 ng/L   TROPONIN-HIGH SENSITIVITY    Collection Time: 12/19/21  5:44 AM   Result Value Ref Range Troponin-High Sensitivity 34 0 - 51 ng/L   TROPONIN-HIGH SENSITIVITY    Collection Time: 12/19/21  5:04 PM   Result Value Ref Range    Troponin-High Sensitivity 28 0 - 51 ng/L   TSH 3RD GENERATION    Collection Time: 12/19/21  5:04 PM   Result Value Ref Range    TSH 1.49 0.36 - 3.74 uIU/mL     CXR Results  (Last 48 hours)               12/19/21 0209  XR CHEST PORT Final result    Impression:  No acute process. Narrative:  INDICATION: CP       EXAM:  AP CHEST RADIOGRAPH       COMPARISON: December 17, 2021       FINDINGS:       AP portable view of the chest demonstrates a normal cardiomediastinal   silhouette. There is no edema, effusion, consolidation, or pneumothorax. The   osseous structures are unremarkable. Assessment:     Active Problems:    Palpitations (12/19/2021)      Chest pain (12/19/2021)        Plan:     1.-Acute onset chest pain-rule out cardiac ischemia; EKGs and troponin labs reviewed, chest x-ray unremarkable, no recent history of trauma  -Patient normally followed by Dr. Concepcion Najera will consult Dr. Rosa Berry from [address] [place] [address] for evaluation and treatment recommendations  Hypertension currently uncontrolled-renal vascular ultrasound ordered by cardiology, and cardiology adjusting medications  Patient has a history of cardiac arrhythmias-monitor on telemetry; echocardiogram and likely stress test in a.m. 2.  Hyperlipidemia-check fasting lipid panel in a.m. restart home statin medication  3. Hypothyroidism check TSH and restart patient's home levothyroxine  4. History of chronic pain from degenerative joint disease and spinal stenosis  Patient recently had a steroid joint injection-apparently is scheduled to receive another steroid joint injection in the near future  Resume patient's home dose tramadol  5.   History of acid reflux-GI symptoms as a possible cause of her chest pain  Patient has never had an endoscopy-start Protonix twice daily-patient should likely continue this for 2 weeks and then switch to Protonix once daily to complete 1 month of treatment  And follow-up with GI if symptoms persist  6. Mild hyponatremia-likely related to patient's recent recent starting on Lasix-hold Lasix for now  7.   Patient is a full code-her surrogate decision-maker is her son Shira Walton phone number [phone]      Signed By: Donna Chaudhary MD     December 19, 2021

## 2021-12-20 ENCOUNTER — APPOINTMENT (OUTPATIENT)
Dept: NON INVASIVE DIAGNOSTICS | Age: 81
DRG: 305 | End: 2021-12-20
Attending: INTERNAL MEDICINE
Payer: MEDICARE

## 2021-12-20 ENCOUNTER — APPOINTMENT (OUTPATIENT)
Dept: VASCULAR SURGERY | Age: 81
DRG: 305 | End: 2021-12-20
Attending: INTERNAL MEDICINE
Payer: MEDICARE

## 2021-12-20 ENCOUNTER — APPOINTMENT (OUTPATIENT)
Dept: NUCLEAR MEDICINE | Age: 81
DRG: 305 | End: 2021-12-20
Attending: INTERNAL MEDICINE
Payer: MEDICARE

## 2021-12-20 LAB
STRESS BASELINE DIAS BP: 88 MMHG
STRESS BASELINE HR: 68 BPM
STRESS BASELINE ST DEPRESSION: 0 MM
STRESS BASELINE SYS BP: 156 MMHG
STRESS ESTIMATED WORKLOAD: 1 METS
STRESS EXERCISE DUR MIN: NORMAL
STRESS PEAK DIAS BP: 88 MMHG
STRESS PEAK SYS BP: 156 MMHG
STRESS PERCENT HR ACHIEVED: 55 %
STRESS POST PEAK HR: 78 BPM
STRESS RATE PRESSURE PRODUCT: NORMAL BPM*MMHG
STRESS ST DEPRESSION: 0 MM
STRESS STAGE 1 BP: NORMAL MMHG
STRESS STAGE 1 HR: 77 BPM
STRESS STAGE RECOVERY 1 BP: NORMAL MMHG
STRESS STAGE RECOVERY 1 HR: 74 BPM
STRESS TARGET HR: 141 BPM

## 2021-12-20 PROCEDURE — 93975 VASCULAR STUDY: CPT

## 2021-12-20 PROCEDURE — 78452 HT MUSCLE IMAGE SPECT MULT: CPT

## 2021-12-20 PROCEDURE — A9500 TC99M SESTAMIBI: HCPCS

## 2021-12-20 PROCEDURE — 74011250637 HC RX REV CODE- 250/637: Performed by: INTERNAL MEDICINE

## 2021-12-20 PROCEDURE — 74011250636 HC RX REV CODE- 250/636: Performed by: INTERNAL MEDICINE

## 2021-12-20 PROCEDURE — G0378 HOSPITAL OBSERVATION PER HR: HCPCS

## 2021-12-20 PROCEDURE — 74011250637 HC RX REV CODE- 250/637: Performed by: FAMILY MEDICINE

## 2021-12-20 RX ORDER — PANTOPRAZOLE SODIUM 40 MG/1
40 TABLET, DELAYED RELEASE ORAL
Qty: 28 TABLET | Refills: 0 | Status: SHIPPED | OUTPATIENT
Start: 2021-12-20 | End: 2022-01-03

## 2021-12-20 RX ORDER — TETRAKIS(2-METHOXYISOBUTYLISOCYANIDE)COPPER(I) TETRAFLUOROBORATE 1 MG/ML
30.2 INJECTION, POWDER, LYOPHILIZED, FOR SOLUTION INTRAVENOUS
Status: COMPLETED | OUTPATIENT
Start: 2021-12-20 | End: 2021-12-20

## 2021-12-20 RX ORDER — HYDROCHLOROTHIAZIDE 25 MG/1
25 TABLET ORAL DAILY
Status: DISCONTINUED | OUTPATIENT
Start: 2021-12-20 | End: 2021-12-22

## 2021-12-20 RX ORDER — LEVOTHYROXINE SODIUM 75 UG/1
75 TABLET ORAL
Status: DISCONTINUED | OUTPATIENT
Start: 2021-12-20 | End: 2021-12-29 | Stop reason: HOSPADM

## 2021-12-20 RX ORDER — TETRAKIS(2-METHOXYISOBUTYLISOCYANIDE)COPPER(I) TETRAFLUOROBORATE 1 MG/ML
10.9 INJECTION, POWDER, LYOPHILIZED, FOR SOLUTION INTRAVENOUS
Status: COMPLETED | OUTPATIENT
Start: 2021-12-20 | End: 2021-12-20

## 2021-12-20 RX ORDER — POTASSIUM CHLORIDE 750 MG/1
20 TABLET, FILM COATED, EXTENDED RELEASE ORAL DAILY
Status: COMPLETED | OUTPATIENT
Start: 2021-12-20 | End: 2021-12-22

## 2021-12-20 RX ORDER — CARVEDILOL 12.5 MG/1
25 TABLET ORAL 2 TIMES DAILY WITH MEALS
Status: DISCONTINUED | OUTPATIENT
Start: 2021-12-20 | End: 2021-12-21

## 2021-12-20 RX ORDER — LOSARTAN POTASSIUM 100 MG/1
100 TABLET ORAL EVERY EVENING
Qty: 30 TABLET | Refills: 0 | Status: SHIPPED | OUTPATIENT
Start: 2021-12-20 | End: 2021-12-29

## 2021-12-20 RX ORDER — CARVEDILOL 25 MG/1
25 TABLET ORAL 2 TIMES DAILY WITH MEALS
Qty: 60 TABLET | Refills: 0 | Status: SHIPPED | OUTPATIENT
Start: 2021-12-20 | End: 2021-12-29

## 2021-12-20 RX ORDER — HYDROCHLOROTHIAZIDE 25 MG/1
25 TABLET ORAL DAILY
Qty: 30 TABLET | Refills: 0 | Status: SHIPPED | OUTPATIENT
Start: 2021-12-21 | End: 2021-12-29

## 2021-12-20 RX ORDER — SODIUM CHLORIDE 0.9 % (FLUSH) 0.9 %
20 SYRINGE (ML) INJECTION
Status: COMPLETED | OUTPATIENT
Start: 2021-12-20 | End: 2021-12-20

## 2021-12-20 RX ADMIN — HYDROCHLOROTHIAZIDE 25 MG: 25 TABLET ORAL at 12:32

## 2021-12-20 RX ADMIN — CARVEDILOL 25 MG: 12.5 TABLET, FILM COATED ORAL at 08:46

## 2021-12-20 RX ADMIN — Medication 20 ML: at 10:27

## 2021-12-20 RX ADMIN — Medication 10 ML: at 07:46

## 2021-12-20 RX ADMIN — TRAMADOL HYDROCHLORIDE 50 MG: 50 TABLET, COATED ORAL at 15:26

## 2021-12-20 RX ADMIN — CARVEDILOL 25 MG: 12.5 TABLET, FILM COATED ORAL at 17:16

## 2021-12-20 RX ADMIN — TETRAKIS(2-METHOXYISOBUTYLISOCYANIDE)COPPER(I) TETRAFLUOROBORATE 30.2 MILLICURIE: 1 INJECTION, POWDER, LYOPHILIZED, FOR SOLUTION INTRAVENOUS at 12:00

## 2021-12-20 RX ADMIN — REGADENOSON 0.4 MG: 0.08 INJECTION, SOLUTION INTRAVENOUS at 10:27

## 2021-12-20 RX ADMIN — LEVOTHYROXINE SODIUM 75 MCG: 0.07 TABLET ORAL at 08:46

## 2021-12-20 RX ADMIN — Medication 10 ML: at 15:26

## 2021-12-20 RX ADMIN — ATORVASTATIN CALCIUM 40 MG: 40 TABLET, FILM COATED ORAL at 21:05

## 2021-12-20 RX ADMIN — PANTOPRAZOLE SODIUM 40 MG: 40 TABLET, DELAYED RELEASE ORAL at 07:46

## 2021-12-20 RX ADMIN — LOSARTAN POTASSIUM 100 MG: 50 TABLET, FILM COATED ORAL at 17:16

## 2021-12-20 RX ADMIN — TETRAKIS(2-METHOXYISOBUTYLISOCYANIDE)COPPER(I) TETRAFLUOROBORATE 10.9 MILLICURIE: 1 INJECTION, POWDER, LYOPHILIZED, FOR SOLUTION INTRAVENOUS at 07:50

## 2021-12-20 RX ADMIN — PANTOPRAZOLE SODIUM 40 MG: 40 TABLET, DELAYED RELEASE ORAL at 17:16

## 2021-12-20 RX ADMIN — Medication 10 ML: at 21:06

## 2021-12-20 RX ADMIN — POTASSIUM CHLORIDE 20 MEQ: 750 TABLET, FILM COATED, EXTENDED RELEASE ORAL at 09:00

## 2021-12-20 NOTE — PROGRESS NOTES
1530: TRANSFER - IN REPORT:    Verbal report received from Ray Law RN(name) on Meggan Qureshi  being received from [facility](unit) for routine progression of care      Report consisted of patients Situation, Background, Assessment and   Recommendations(SBAR). Information from the following report(s) SBAR, Kardex, Procedure Summary, Intake/Output, MAR and Recent Results was reviewed with the receiving nurse. Opportunity for questions and clarification was provided. Assessment completed upon patients arrival to unit and care assumed. 1900: Pt had 20 beats Vtach while sleeping. BP stable. Dr. Crawford Needs aware. 1930: Bedside shift change report given to [address] [place] [address] (oncoming nurse) by Heydi Purdy RN (offgoing nurse). Report included the following information SBAR, Kardex, Procedure Summary, Intake/Output, MAR and Recent Results.

## 2021-12-20 NOTE — PROGRESS NOTES
1930:Bedside and Verbal shift change report given to Slimeroderick Edmond and Paoloelymartell Bello (oncoming nurse) by Carly Painter (offgoing nurse). Report included the following information SBAR, Kardex, ED Summary, MAR, Recent Results and Cardiac Rhythm NSR.       0730: Bedside and Verbal shift change report given to Elsa Salazar (oncoming nurse) by Cruz Raphael RN (offgoing nurse). Report included the following information SBAR, Kardex, ED Summary, Accordion, Recent Results and Cardiac Rhythm Sinus Rhythm. Problem: Falls - Risk of  Goal: *Absence of Falls  Description: Document Earma Eli Fall Risk and appropriate interventions in the flowsheet.   Outcome: Progressing Towards Goal  Note: Fall Risk Interventions:            Medication Interventions: Evaluate medications/consider consulting pharmacy                   Problem: Hypertension  Goal: *Blood pressure within specified parameters  Outcome: Progressing Towards Goal  Goal: *Fluid volume balance  Outcome: Progressing Towards Goal

## 2021-12-20 NOTE — PROGRESS NOTES
Charting and patient care of Neymar Cordoba by Suzanne Velez  from 0157* to 0800 was supervised and reviewed by this RN.     Cory Poole RN

## 2021-12-20 NOTE — DISCHARGE SUMMARY
Discharge Summary       PATIENT ID: Elena Medina  MRN: [id number]   YOB: 1940    DATE OF ADMISSION: 12/19/2021  1:45 AM    DATE OF DISCHARGE: 12/20/2021   PRIMARY CARE PROVIDER: Lola Smith MD     DISCHARGING PROVIDER: Juan Antonio Cuevas MD    To contact this individual call [phone] and ask the  to page. If unavailable ask to be transferred the Adult Hospitalist Department. CONSULTATIONS: IP CONSULT TO CARDIOLOGY    PROCEDURES/SURGERIES: * No surgery found *    ADMITTING DIAGNOSES & HOSPITAL COURSE:   Chest pain   HTN emergency     71y/o female with pmh of HLD, HTN who presented with recurrent chest pain in the context of HTN emergency. [facility] with BPs that were elevated and associated with chest pain. Her first visit, she was started on additional BP medications, and discharged after having neg enzymes, and neg EKG. She represented with additionally elevated BPs; and required admission. BP have been stabilized with additional blood pressure agents, metoprolol was switched to coreg, from ACE to losartan, and was also started on hydrochlorothiazide. Pt underwent stress test 12/20 which was negative for reversible ischemia. She underwent renal US with duplex which was negative for stenosis. Echo was not done inpatient, and cardiology will perform as outpatient in the office. Pt otherwise stable for DC, and BP's in acceptable range (~140-150's)    DISCHARGE DIAGNOSES / PLAN:      Chest pain  - Stress test negative  - Troponins negative  - EKG non focal     HTN emergency   - DC metoprolol, lisinopril  - DC on Coreg, hydrochlorothiazide, losartan     GERD - start PPI   Hypothyroidism - levothyroxine  Mild hyponatremia        ADDITIONAL CARE RECOMMENDATIONS:   - Please take all medications as prescribed. Note changes as below.    **STOP metoprolol, and stop lisinopril  **Start coreg, losartan, HCTZ  - Please make sure to follow up with your primary care physician within 1-2 weeks of discharge for hospital follow up. You should also follow up with Dr. Marion Recinos for outpatient echocardiogram   - Please make sure to continue to monitor for: Chest pain, shortness of breath, high fevers, and return to the Emergency Department with any of these symptoms.   - Please get up slowly from a seated or laying position, avoid falls. - Avoid tobacco, alcohol and other illicit drug use. - Diet restrictions: Low salt   - Activity restrictions: None          PENDING TEST RESULTS:   At the time of discharge the following test results are still pending: None    FOLLOW UP APPOINTMENTS:    Follow-up Information     Follow up With Specialties Details Why Contact Info    Devonte Jeffrey MD [facility] Medicine Call   [address] [facility]  [id number] [place] [address]  [phone]      Ryan Montero MD Cardio Vascular Surgery, Clinical Cardiac Electrophysiology Call in 1 week For hospital follow up, and echocardiogram  [address] [address] [facility],[address]  [phone]               DIET: Resume previous diet    ACTIVITY: Activity as tolerated    WOUND CARE: None    EQUIPMENT needed: None      DISCHARGE MEDICATIONS:  Current Discharge Medication List      START taking these medications    Details   carvediloL (COREG) 25 mg tablet Take 1 Tablet by mouth two (2) times daily (with meals) for 30 days. Qty: 60 Tablet, Refills: 0  Start date: 12/20/2021, End date: 1/19/2022      hydroCHLOROthiazide (HYDRODIURIL) 25 mg tablet Take 1 Tablet by mouth daily for 30 days. Qty: 30 Tablet, Refills: 0  Start date: 12/21/2021, End date: 1/20/2022      losartan (COZAAR) 100 mg tablet Take 1 Tablet by mouth every evening for 30 days. Qty: 30 Tablet, Refills: 0  Start date: 12/20/2021, End date: 1/19/2022      pantoprazole (PROTONIX) 40 mg tablet Take 1 Tablet by mouth Before breakfast and dinner for 14 days.   Qty: 28 Tablet, Refills: 0  Start date: 12/20/2021, End date: 1/3/2022         CONTINUE these medications which have NOT CHANGED    Details   apixaban (Eliquis) 5 mg tablet Take 5 mg by mouth two (2) times a day. traMADoL (ULTRAM) 50 mg tablet Take 50 mg by mouth every six (6) hours as needed for Pain. atorvastatin (LIPITOR) 40 mg tablet Take 40 mg by mouth daily. levothyroxine (SYNTHROID) 75 mcg tablet Take 75 mcg by mouth Daily (before breakfast). co-enzyme Q-10 (COQ-10) 100 mg capsule Take 100 mg by mouth daily. STOP taking these medications       lisinopriL (PRINIVIL, ZESTRIL) 20 mg tablet Comments:   Reason for Stopping:         furosemide (Lasix) 20 mg tablet Comments:   Reason for Stopping:         metoprolol tartrate (LOPRESSOR) 50 mg tablet Comments:   Reason for Stopping:                 NOTIFY YOUR PHYSICIAN FOR ANY OF THE FOLLOWING:   Fever over 101 degrees for 24 hours. Chest pain, shortness of breath, fever, chills, nausea, vomiting, diarrhea, change in mentation, falling, weakness, bleeding. Severe pain or pain not relieved by medications. Or, any other signs or symptoms that you may have questions about.     DISPOSITION:  X  Home With:   OT  PT  HH  RN       Long term SNF/Inpatient Rehab    Independent/assisted living    Hospice    Other:       PATIENT CONDITION AT DISCHARGE:     Functional status    Poor     Deconditioned    X Independent      Cognition   X  Lucid     Forgetful     Dementia      Catheters/lines (plus indication)    [place]     PICC     PEG    X None      Code status    X Full code     DNR      PHYSICAL EXAMINATION AT DISCHARGE:  Visit Vitals  BP (!) 162/72   Pulse 63   Temp 98 °F (36.7 °C)   Resp 17   Ht 5' 8\" (1.727 m)   Wt 92 kg (202 lb 13.2 oz)   SpO2 96%   BMI 30.84 kg/m²     Gen: NAD, awake in bed  HEENT: NC/AT, sclera anicteric, PERRL, EOMI  CV: RRR no m/r/g, normal S1 and S2, no pedal edema   Resp: CTA b/l no increased work of breathing, no wheezing or rhonchi, speaking in full sentences   Abd: NT/ND, normal bowel sounds, no rebound or guarding  Ext: 2+ pulses, no edema  Skin: No rashes or lesions      CHRONIC MEDICAL DIAGNOSES:  Problem List as of 12/20/2021 Date Reviewed: 10/14/2020          Codes Class Noted - Resolved    Palpitations ICD-10-CM: R00.2  ICD-9-CM: 785.1  12/19/2021 - Present        Chest pain ICD-10-CM: R07.9  ICD-9-CM: 786.50  12/19/2021 - Present        LBBB (left bundle branch block) ICD-10-CM: I44.7  ICD-9-CM: 426.3  4/8/2019 - Present        Acute chest pain ICD-10-CM: R07.9  ICD-9-CM: 786.50  4/8/2019 - Present        Uncontrolled hypertension ICD-10-CM: I10  ICD-9-CM: 401.9  4/8/2019 - Present        RESOLVED: Chest pain ICD-10-CM: R07.9  ICD-9-CM: 786.50  4/8/2019 - 8/3/2021              Greater than 30 minutes were spent with the patient on counseling and coordination of care    Signed:   Maricruz Borrego MD  12/20/2021  2:37 PM

## 2021-12-20 NOTE — CONSULTS
CARDIOLOGY                 Assessment:     Assessment:       Active Problems:    Palpitations (12/19/2021)      Chest pain (12/19/2021)         Plan:    1. WCT :   New finding   holter 8./2021  <1% pvcs  appears to be focal mechanism and most consistent with aberrancy not VT   rec : Increased dose of coreg ( blocker )    echo   Continue monitoring   2. 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 Hx PAF   4. Uncontrolled htn :  Renal study to eval for LETICIA   5. Non obstructive cad 4.2019   6. Elevated troponin:    lexiscan MPI   7. Back pain               Subjective:    Date of  Admission: 12/19/2021  1:45 AM     Admission type:Emergency    Pritesh Bro is a 80 y.o. female admitted for Chest pain [R07.9]  Palpitations [R00.2]. presents a second episode of chest pain. Her first episode of chest pain was on 12/16/2021-she was [facility] found to have mild lower extremity swelling and uncontrolled hypertension with systolic blood pressures in the 200s-at that time her heart enzymes were unremarkable her EKG negative for MI and patient was sent home on Lasix.   She took Lasix Friday and Saturday-and woke up again at 1 AM this morning with severe chest pain-8 out of 10 midsternal in location nonradiating  Since admission been having recurrent NSVT     Patient Active Problem List    Diagnosis Date Noted    Palpitations 12/19/2021    Chest pain 12/19/2021    LBBB (left bundle branch block) 04/08/2019    Acute chest pain 04/08/2019    Uncontrolled hypertension 04/08/2019      Savanna Rajput MD  Past Medical History:   Diagnosis Date    Arrhythmia     attempted ablation    Arthritis     GERD (gastroesophageal reflux disease)     High cholesterol     History of seasonal allergies     Hypertension     Sleep apnea     mild-does not use CPAP    Thyroid disease       Past Surgical History:   Procedure Laterality Date    COLONOSCOPY N/A 10/14/2020 COLONOSCOPY    :- [facility] ENDOSCOPY    HX BREAST BIOPSY Right Years ago    Negative    HX CATARACT REMOVAL      bilateral    HX OTHER SURGICAL      cyst removed from left cheek    HI BREAST SURGERY PROCEDURE UNLISTED      breast biopsy-local - benign    HI CARDIAC SURG PROCEDURE UNLIST      attempted ablation     No Known Allergies   Family History   Problem Relation Age of Onset    Other Mother         multiple myeloma    Stroke Father     Heart Disease Brother         ablation      Current Facility-Administered Medications   Medication Dose Route Frequency    regadenoson (LEXISCAN) injection 0.4 mg  0.4 mg IntraVENous RAD ONCE    saline peripheral flush soln 20 mL  20 mL InterCATHeter RAD ONCE    nitroglycerin (NITROSTAT) tablet 0.4 mg  0.4 mg SubLINGual PRN    losartan (COZAAR) tablet 100 mg  100 mg Oral QPM    carvediloL (COREG) tablet 12.5 mg  12.5 mg Oral BID WITH MEALS    atorvastatin (LIPITOR) tablet 40 mg  40 mg Oral QHS    pantoprazole (PROTONIX) tablet 40 mg  40 mg Oral ACB&D    traMADoL (ULTRAM) tablet 50 mg  50 mg Oral Q4H PRN    sodium chloride (NS) flush 5-40 mL  5-40 mL IntraVENous Q8H    sodium chloride (NS) flush 5-40 mL  5-40 mL IntraVENous PRN    acetaminophen (TYLENOL) tablet 650 mg  650 mg Oral Q6H PRN    Or    acetaminophen (TYLENOL) suppository 650 mg  650 mg Rectal Q6H PRN    polyethylene glycol (MIRALAX) packet 17 g  17 g Oral DAILY PRN    ondansetron (ZOFRAN ODT) tablet 4 mg  4 mg Oral Q8H PRN    Or    ondansetron (ZOFRAN) injection 4 mg  4 mg IntraVENous Q4H PRN         Review of Symptoms:  A comprehensive review of systems was negative. No hemoptysis, hematemesis, epistaxis, melena, hematuria.   No fevers,  Rashes, seizures, visual disturbances, difficulty walking, no abdominal pain         Physical Exam    Visit Vitals  BP (!) 155/93 (BP 1 Location: Right upper arm)   Pulse (!) 56   Temp 98.6 °F (37 °C)   Resp 19   Ht 5' 8\" (1.727 m)   Wt 202 lb 13.2 oz (92 kg)   SpO2 91%   BMI 30.84 kg/m²     Skin warm and dry  PERRLA, EOMI  Oropharynx without exudate. Mallampati 2  Neck supple, thyroid not enlarged  Lungs clear  PMI non displaced. Normal S1/ S2   No Mummurs, click or Rubs  No S3 or S4  Abdomen soft and non tender, No Hepatosplenomegaly  Pulses 2+ throughout,    Neuro:    normal facial grimace,  Moves all extremities.    AAAO  unanxious      Cardiographics    Telemetry:WCT  20 beat    warmup   ECG:  nsr    nsvt        Labs:   Recent Results (from the past 24 hour(s))   TROPONIN-HIGH SENSITIVITY    Collection Time: 12/19/21  5:04 PM   Result Value Ref Range    Troponin-High Sensitivity 28 0 - 51 ng/L   TSH 3RD GENERATION    Collection Time: 12/19/21  5:04 PM   Result Value Ref Range    TSH 1.49 0.36 - 3.74 uIU/mL   NUCLEAR CARDIAC STRESS TEST    Collection Time: 12/20/21  7:32 AM   Result Value Ref Range    Stress Target  bpm

## 2021-12-20 NOTE — PROGRESS NOTES
0730: Bedside shift change report given to Richard Washington (oncoming nurse) by Cony/Kenneth (offgoing nurse). Report included the following information SBAR, Kardex, MAR, Recent Results and Cardiac Rhythm SR.     0800: Telephones orders received for pt to travel without nurse and monitor. 1845: Pt taken off monitor and IVs removed for discharge. Upon standing, pt stated \"im dizzy\". Pt appeared pale and diaphoretic. Pt placed back on monitor. HR 62 BP 72/40 on retake. MD Nicolas notified. Orders to d/c discharge order and 500 cc bolus. 1900: 103/49 BP. Care plan:     Problem: Falls - Risk of  Goal: *Absence of Falls  Description: Document Mayito Fall Risk and appropriate interventions in the flowsheet.   Outcome: Progressing Towards Goal  Note: Fall Risk Interventions:     Medication Interventions: Teach patient to arise slowly,Patient to call before getting OOB    Problem: Patient Education: Go to Patient Education Activity  Goal: Patient/Family Education  Outcome: Progressing Towards Goal     Problem: Hypertension  Goal: *Blood pressure within specified parameters  Outcome: Progressing Towards Goal  Goal: *Fluid volume balance  Outcome: Progressing Towards Goal

## 2021-12-20 NOTE — PROGRESS NOTES
Transition of Care Plan:            * Disposition- Home with spouse  * PCP, cardiology F/U  * OBS notice provided    Reason for Admission:  Chest pain                     RUR Score:      N/A               Plan for utilizing home health:        None  PCP: First and Last name:  Edin Landeros MD     Name of Practice:    Are you a current patient: Yes/No: Yes   Approximate date of last visit: In last 6 months   Can you participate in a virtual visit with your PCP:                     Current Advanced Directive/Advance Care Plan: Full Code      Healthcare Decision Maker:   Click here to complete [address] including selection of the Healthcare Decision Maker Relationship (ie \"Primary\")                             Transition of Care Plan:           * Disposition- Home with spouse  * PCP, cardiology F/U  * OBS notice provided    Ethan Conti is a 80 yr old female who presented to hospital with chief complaint of chest pain. Medical W/U is in progress. Patient reports full independence with mobility and ADL's. Patient lives with her spouse. Oral and Written notification given to patient and/or caregiver informing them that they are currently an Outpatient receiving care in our facility. Outpatient services include Observation Services. Care Management Interventions  PCP Verified by CM:  Yes  Discharge Durable Medical Equipment: No  Health Maintenance Reviewed: Yes  Physical Therapy Consult: No  Occupational Therapy Consult: No  Speech Therapy Consult: No  Support Systems: Spouse/Significant Other  The Patient and/or Patient Representative was Provided with a Choice of Provider and Agrees with the Discharge Plan?: Yes  Name of the Patient Representative Who was Provided with a Choice of Provider and Agrees with the Discharge Plan: Patient  [address] Provided?: No  Discharge Location  Discharge Placement: Home     Jamia Isidro ACM-SW  Case /[facility]  C: [phone]

## 2021-12-21 PROCEDURE — 65660000000 HC RM CCU STEPDOWN

## 2021-12-21 PROCEDURE — 74011250637 HC RX REV CODE- 250/637: Performed by: INTERNAL MEDICINE

## 2021-12-21 PROCEDURE — G0378 HOSPITAL OBSERVATION PER HR: HCPCS

## 2021-12-21 PROCEDURE — 74011250637 HC RX REV CODE- 250/637: Performed by: FAMILY MEDICINE

## 2021-12-21 RX ORDER — CARVEDILOL 12.5 MG/1
12.5 TABLET ORAL 2 TIMES DAILY WITH MEALS
Status: DISCONTINUED | OUTPATIENT
Start: 2021-12-21 | End: 2021-12-22

## 2021-12-21 RX ORDER — CARVEDILOL 12.5 MG/1
12.5 TABLET ORAL 2 TIMES DAILY WITH MEALS
Status: DISCONTINUED | OUTPATIENT
Start: 2021-12-21 | End: 2021-12-21

## 2021-12-21 RX ORDER — CARVEDILOL 12.5 MG/1
25 TABLET ORAL 2 TIMES DAILY WITH MEALS
Status: DISCONTINUED | OUTPATIENT
Start: 2021-12-21 | End: 2021-12-21

## 2021-12-21 RX ORDER — LOSARTAN POTASSIUM 50 MG/1
50 TABLET ORAL EVERY EVENING
Status: DISCONTINUED | OUTPATIENT
Start: 2021-12-21 | End: 2021-12-22

## 2021-12-21 RX ADMIN — Medication 10 ML: at 07:10

## 2021-12-21 RX ADMIN — ACETAMINOPHEN 650 MG: 325 TABLET ORAL at 00:11

## 2021-12-21 RX ADMIN — POLYETHYLENE GLYCOL 3350 17 G: 17 POWDER, FOR SOLUTION ORAL at 10:33

## 2021-12-21 RX ADMIN — POTASSIUM CHLORIDE 20 MEQ: 750 TABLET, FILM COATED, EXTENDED RELEASE ORAL at 09:01

## 2021-12-21 RX ADMIN — LEVOTHYROXINE SODIUM 75 MCG: 0.07 TABLET ORAL at 07:10

## 2021-12-21 RX ADMIN — LOSARTAN POTASSIUM 50 MG: 50 TABLET, FILM COATED ORAL at 17:09

## 2021-12-21 RX ADMIN — PANTOPRAZOLE SODIUM 40 MG: 40 TABLET, DELAYED RELEASE ORAL at 07:10

## 2021-12-21 RX ADMIN — Medication 10 ML: at 20:38

## 2021-12-21 RX ADMIN — APIXABAN 5 MG: 5 TABLET, FILM COATED ORAL at 09:01

## 2021-12-21 RX ADMIN — TRAMADOL HYDROCHLORIDE 50 MG: 50 TABLET, COATED ORAL at 19:06

## 2021-12-21 RX ADMIN — APIXABAN 5 MG: 5 TABLET, FILM COATED ORAL at 20:38

## 2021-12-21 RX ADMIN — PANTOPRAZOLE SODIUM 40 MG: 40 TABLET, DELAYED RELEASE ORAL at 17:09

## 2021-12-21 RX ADMIN — ATORVASTATIN CALCIUM 40 MG: 40 TABLET, FILM COATED ORAL at 20:38

## 2021-12-21 RX ADMIN — CARVEDILOL 12.5 MG: 12.5 TABLET, FILM COATED ORAL at 13:32

## 2021-12-21 RX ADMIN — ACETAMINOPHEN 650 MG: 325 TABLET ORAL at 10:26

## 2021-12-21 RX ADMIN — HYDROCHLOROTHIAZIDE 25 MG: 25 TABLET ORAL at 09:01

## 2021-12-21 NOTE — PROGRESS NOTES
[address] [facility] Adult  Hospitalist Group                                                                                          Hospitalist Progress Note  Claudetta Grange, MD  Answering service: [phone] OR [phone] from in house phone        Date of Service:  2021  NAME:  Meggan Qureshi  :  1940  MRN:  [id number]      Admission Summary:   71y/o female with pmh of HLD, HTN who presented with recurrent chest pain in the context of HTN emergency. [facility]int [facility] [place] [facility] with BPs that were elevated and associated with chest pain. Her first visit, she was started on additional BP medications, and discharged after having neg enzymes, and neg EKG. She represented with additionally elevated BPs; and required admission. Interval history / Subjective:   Pt was going to DC home yesterday however became hypotensive with standing getting up from chair, required 500cc bolus. BP now back up to 465'S systolic      Assessment & Plan:     Chest pain  - Stress test negative  - Troponins negative  - EKG non focal   - Cardiology following      HTN emergency - with chest pain   - DC metoprolol, lisinopril  - Decrease coreg, losartan and HCTZ to half doses  - Cardiology following    H/o Afib - eliquis, coreg      GERD - start PPI   Hypothyroidism - levothyroxine  Mild hyponatremia     Code status: FULL  DVT prophylaxis: Eliquis     Care Plan discussed with: Patient/Family  Anticipated Disposition: Home w/Family  Anticipated Discharge: 24 hours to 48 hours      Hospital Problems  Date Reviewed: 2021          Codes Class Noted POA    Hypertensive emergency ICD-10-CM: I16.1  ICD-9-CM: 401.9  2021 Unknown        Palpitations ICD-10-CM: R00.2  ICD-9-CM: 785.1  2021 Unknown        Chest pain ICD-10-CM: R07.9  ICD-9-CM: 786.50  2021 Unknown                Review of Systems:   A comprehensive review of systems was negative except for that written in the HPI. Vital Signs:    Last 24hrs VS reviewed since prior progress note. Most recent are:  Visit Vitals  BP (!) 216/95   Pulse 64   Temp 97.7 °F (36.5 °C)   Resp 16   Ht 5' 8\" (1.727 m)   Wt 93.8 kg (206 lb 12.7 oz)   SpO2 95%   BMI 31.44 kg/m²         Intake/Output Summary (Last 24 hours) at 12/21/2021 1443  Last data filed at 12/21/2021 0430  Gross per 24 hour   Intake 630 ml   Output 0 ml   Net 630 ml        Physical Examination:     I had a face to face encounter with this patient and independently examined them on 12/21/2021 as outlined below:          Constitutional:  No acute distress, cooperative, pleasant    ENT:  Oral mucosa moist, oropharynx benign. Resp:  CTA bilaterally. No wheezing/rhonchi/rales. No accessory muscle use   CV:  Regular rhythm, normal rate, no murmurs, gallops, rubs    GI:  Soft, non distended, non tender. normoactive bowel sounds, no hepatosplenomegaly     Musculoskeletal:  No edema, warm, 2+ pulses throughout     Neurologic:  Moves all extremities. AAOx3, CN II-XII reviewed             Data Review:    Review and/or order of clinical lab test  Review and/or order of tests in the radiology section of CPT  Review and/or order of tests in the medicine section of CPT      Labs:     Recent Labs     12/19/21  0157   WBC 9.2   HGB 13.4   HCT 41.4        Recent Labs     12/19/21  0157   *   K 3.7   CL 95*   CO2 28   BUN 20   CREA 0.96   *   CA 9.0   MG 1.8     Recent Labs     12/19/21  0157   ALT 24   AP 81   TBILI 0.6   TP 7.8   ALB 3.6   GLOB 4.2*     No results for input(s): INR, PTP, APTT, INREXT in the last 72 hours. No results for input(s): FE, TIBC, PSAT, FERR in the last 72 hours. No results found for: FOL, RBCF   No results for input(s): PH, PCO2, PO2 in the last 72 hours. No results for input(s): CPK, CKNDX, TROIQ in the last 72 hours.     No lab exists for component: CPKMB  No results found for: CHOL, CHOLX, CHLST, CHOLV, HDL, HDLP, LDL, LDLC, DLDLP, TGLX, TRIGL, TRIGP, CHHD, CHHDX  No results found for: GLUCPOC  No results found for: COLOR, APPRN, SPGRU, REFSG, EDWARD, PROTU, GLUCU, KETU, BILU, UROU, EARL, LEUKU, GLUKE, EPSU, BACTU, WBCU, RBCU, CASTS, UCRY      Medications Reviewed:     Current Facility-Administered Medications   Medication Dose Route Frequency    losartan (COZAAR) tablet 50 mg  50 mg Oral QPM    apixaban (ELIQUIS) tablet 5 mg  5 mg Oral BID    carvediloL (COREG) tablet 12.5 mg  12.5 mg Oral BID WITH MEALS    levothyroxine (SYNTHROID) tablet 75 mcg  75 mcg Oral 6am    hydroCHLOROthiazide (HYDRODIURIL) tablet 25 mg  25 mg Oral DAILY    potassium chloride SR (KLOR-CON 10) tablet 20 mEq  20 mEq Oral DAILY    nitroglycerin (NITROSTAT) tablet 0.4 mg  0.4 mg SubLINGual PRN    atorvastatin (LIPITOR) tablet 40 mg  40 mg Oral QHS    pantoprazole (PROTONIX) tablet 40 mg  40 mg Oral ACB&D    traMADoL (ULTRAM) tablet 50 mg  50 mg Oral Q4H PRN    sodium chloride (NS) flush 5-40 mL  5-40 mL IntraVENous Q8H    sodium chloride (NS) flush 5-40 mL  5-40 mL IntraVENous PRN    acetaminophen (TYLENOL) tablet 650 mg  650 mg Oral Q6H PRN    Or    acetaminophen (TYLENOL) suppository 650 mg  650 mg Rectal Q6H PRN    polyethylene glycol (MIRALAX) packet 17 g  17 g Oral DAILY PRN    ondansetron (ZOFRAN ODT) tablet 4 mg  4 mg Oral Q8H PRN    Or    ondansetron (ZOFRAN) injection 4 mg  4 mg IntraVENous Q4H PRN     ______________________________________________________________________  EXPECTED LENGTH OF STAY: - - -  ACTUAL LENGTH OF STAY:          0                 Venessa Bryant MD

## 2021-12-21 NOTE — PROGRESS NOTES
1930: Bedside and Verbal shift change report given to Sloane RN (oncoming nurse) by Mercy Fuller RN (offgoing nurse). Report included the following information SBAR, Kardex, ED Summary, Intake/Output, MAR, Recent Results and Cardiac Rhythm SR.     0730: Bedside and Verbal shift change report given to Winifred Medrano RN (oncoming nurse) by Shorty Shea RN (offgoing nurse). Report included the following information SBAR, Kardex, Intake/Output, MAR and Recent Results.

## 2021-12-21 NOTE — PROGRESS NOTES
Cardiology Progress Note                                        Admit Date: 12/19/2021    Assessment/Plan:     1. WCT :   New finding   holter 8./2021  <1% pvcs  appears to be focal mechanism and most consistent with aberrancy not VT    echo pending   Continue monitoring   2. 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 Hx PAF :  Resume eliquis was med PTA   4. Uncontrolled htn :  Renal study to eval for LETICIA   ·   Consistent with borderline renal parenchymal disease in the left kidney. · No evidence of hemodynamically significant stenosis of the renal arteries or superior mesenteric artery. 5. Non obstructive cad 4.2019   6. Elevated troponin:    lexiscan MPI     Normal gated rest/stress myocardial perfusion imaging study. No evidence of myocardial ischemia or infarction. Left ventricular ejection fraction is 65 %    7. Back pain   8. Hypotension with bradycardial:  Symptomatic  cw vasovagal response    rec :  Restarting bp meds at half dose.          Thu Greenwood is a 80 y.o. female with      PROBLEM LIST:  Patient Active Problem List    Diagnosis Date Noted    Hypertensive emergency 12/20/2021    Palpitations 12/19/2021    Chest pain 12/19/2021    LBBB (left bundle branch block) 04/08/2019    Acute chest pain 04/08/2019    Uncontrolled hypertension 04/08/2019         Subjective:     Thu Greenwood denies chest pain. Visit Vitals  BP (!) 212/95 (BP 1 Location: Right arm, BP Patient Position: At rest)   Pulse 63   Temp 97.6 °F (36.4 °C)   Resp 18   Ht 5' 8\" (1.727 m)   Wt 206 lb 12.7 oz (93.8 kg)   SpO2 94%   BMI 31.44 kg/m²       Intake/Output Summary (Last 24 hours) at 12/21/2021 0736  Last data filed at 12/21/2021 0430  Gross per 24 hour   Intake 1110 ml   Output 0 ml   Net 1110 ml       Objective:      Physical Exam:  HEENT: Perrla, EOMI  Neck: No JVD,  No thyroidmegaly  Resp: CTA bilaterally;  No wheezes or rales  CV: RRR s1s2 No murmur no s3  Abd:Soft, Nontender  Ext: No edema  Neuro: Alert and oriented; Nonfocal  Skin: Warm, Dry, Intact  Pulses: 2+ DP/PT/Rad      Telemetry: normal sinus rhythm    Current Facility-Administered Medications   Medication Dose Route Frequency    [Held by provider] carvediloL (COREG) tablet 25 mg  25 mg Oral BID WITH MEALS    levothyroxine (SYNTHROID) tablet 75 mcg  75 mcg Oral 6am    [Held by provider] hydroCHLOROthiazide (HYDRODIURIL) tablet 25 mg  25 mg Oral DAILY    potassium chloride SR (KLOR-CON 10) tablet 20 mEq  20 mEq Oral DAILY    nitroglycerin (NITROSTAT) tablet 0.4 mg  0.4 mg SubLINGual PRN    [Held by provider] losartan (COZAAR) tablet 100 mg  100 mg Oral QPM    atorvastatin (LIPITOR) tablet 40 mg  40 mg Oral QHS    pantoprazole (PROTONIX) tablet 40 mg  40 mg Oral ACB&D    traMADoL (ULTRAM) tablet 50 mg  50 mg Oral Q4H PRN    sodium chloride (NS) flush 5-40 mL  5-40 mL IntraVENous Q8H    sodium chloride (NS) flush 5-40 mL  5-40 mL IntraVENous PRN    acetaminophen (TYLENOL) tablet 650 mg  650 mg Oral Q6H PRN    Or    acetaminophen (TYLENOL) suppository 650 mg  650 mg Rectal Q6H PRN    polyethylene glycol (MIRALAX) packet 17 g  17 g Oral DAILY PRN    ondansetron (ZOFRAN ODT) tablet 4 mg  4 mg Oral Q8H PRN    Or    ondansetron (ZOFRAN) injection 4 mg  4 mg IntraVENous Q4H PRN         Data Review:   Labs:    Recent Results (from the past 24 hour(s))   DUPLEX RENAL ART/ARTHUR BILATERAL    Collection Time: 12/20/21 11:38 AM   Result Value Ref Range    Prox aortic AP 1.95 cm    Prox SMA .6 cm/s    Prox SMA EDV 18.3 cm/s    Right kidney length 9.98 cm    Right kidney width 5.33 cm    Left kidney length 9.03 cm    Left kidney width 5.34 cm    Right renal prox sys 81.8 cm/s    Right renal prox mcwilliams 15.9 cm/s    Right renal mid sys 74.7 cm/s    Right renal mid mcwilliams 21.7 cm/s    Right renal dist sys 60.2 cm/s    Right renal dist mcwilliams 11.8 cm/s    Right renal upper parenchyma max 21.2 cm/s    Right renal upper parenchyma min 7.1 cm/s Right renal middle parenchyma max 21.2 cm/s    Right renal middle parenchyma min 6.5 cm/s    Right renal lower parenchyma max 18.8 cm/s    Right renal lower parenchyma min 6.5 cm/s    Left renal prox sys 93.8 cm/s    Left renal prox mcwilliams 18.8 cm/s    Left renal mid sys 77.8 cm/s    Left renal mid mcwilliams 12.8 cm/s    Left renal dist sys 61.9 cm/s    Left renal dist mcwilliams 13.0 cm/s    Left renal upper parenchyma max 26.6 cm/s    Left renal upper parenchyma min 7.6 cm/s    Left renal middle parenchyma max 24.1 cm/s    Left renal middle parenchyma min 7.6 cm/s    Left renal lower parenchyma max 29.1 cm/s    Left renal lower parenchyma min 6.9 cm/s    Abdominal prox aorta raman 107.2 cm/s    Right renal prox rar 0.76     Right renal mid rar 0.70     Right renal dist rar 0.56     Right renal upper parenchyma RI 0.67     Right renal middle parenchyma RI 0.69     Right renal lower parenchyma RI 0.65     Left renal prox rar 0.88     Left renal mid rar 0.73     Left renal dist rar 0.58     Left renal upper parenchyma RI 0.71     Left renal middle parenchyma RI 0.68     Left renal lower parenchyma RI 0.76     Right Renal Prox RI 0.81     Right Renal Mid RI 0.71     Right Renal Dist RI 0.80     Left Renal Prox RI 0.80     Left Renal Mid RI 0.84     Left Renal Dist RI 0.79    NUCLEAR CARDIAC STRESS TEST    Collection Time: 12/20/21 11:57 AM   Result Value Ref Range    Stress Target  bpm    Exercise Duration Time 00:03:00     Stress Systolic  mmHg    Stress Diastolic BP 88 mmHg    Stress Peak HR 78 BPM    Baseline HR 68 BPM    Stress Estimated Workload 1.0 METS    Stress Rate Pressure Product 12,168 BPM*mmHg    Stress Percent HR Achieved 55 %    Baseline Systolic  mmHg    Baseline Diastolic BP 88 mmHg    Stress Stage 1 HR 77 bpm    Stress Stage 1 BP 97/58 mmHg    Recovery Stage 1 HR 74 bpm    Recovery Stage 1 /71 mmHg    Baseline ST Depression 0 mm    Stress ST Depression 0 mm

## 2021-12-22 LAB
ANION GAP SERPL CALC-SCNC: 9 MMOL/L (ref 5–15)
BASOPHILS # BLD: 0 K/UL (ref 0–0.1)
BASOPHILS NFR BLD: 0 % (ref 0–1)
BUN SERPL-MCNC: 16 MG/DL (ref 6–20)
BUN/CREAT SERPL: 19 (ref 12–20)
CALCIUM SERPL-MCNC: 8.9 MG/DL (ref 8.5–10.1)
CHLORIDE SERPL-SCNC: 95 MMOL/L (ref 97–108)
CO2 SERPL-SCNC: 24 MMOL/L (ref 21–32)
CREAT SERPL-MCNC: 0.84 MG/DL (ref 0.55–1.02)
DIFFERENTIAL METHOD BLD: ABNORMAL
EOSINOPHIL # BLD: 0.1 K/UL (ref 0–0.4)
EOSINOPHIL NFR BLD: 1 % (ref 0–7)
ERYTHROCYTE [DISTWIDTH] IN BLOOD BY AUTOMATED COUNT: 12.2 % (ref 11.5–14.5)
GLUCOSE SERPL-MCNC: 108 MG/DL (ref 65–100)
HCT VFR BLD AUTO: 37.8 % (ref 35–47)
HGB BLD-MCNC: 12.7 G/DL (ref 11.5–16)
IMM GRANULOCYTES # BLD AUTO: 0 K/UL (ref 0–0.04)
IMM GRANULOCYTES NFR BLD AUTO: 0 % (ref 0–0.5)
LYMPHOCYTES # BLD: 2.2 K/UL (ref 0.8–3.5)
LYMPHOCYTES NFR BLD: 24 % (ref 12–49)
MAGNESIUM SERPL-MCNC: 1.8 MG/DL (ref 1.6–2.4)
MCH RBC QN AUTO: 30.8 PG (ref 26–34)
MCHC RBC AUTO-ENTMCNC: 33.6 G/DL (ref 30–36.5)
MCV RBC AUTO: 91.5 FL (ref 80–99)
MONOCYTES # BLD: 1.2 K/UL (ref 0–1)
MONOCYTES NFR BLD: 13 % (ref 5–13)
NEUTS SEG # BLD: 5.5 K/UL (ref 1.8–8)
NEUTS SEG NFR BLD: 62 % (ref 32–75)
NRBC # BLD: 0 K/UL (ref 0–0.01)
NRBC BLD-RTO: 0 PER 100 WBC
PHOSPHATE SERPL-MCNC: 3.4 MG/DL (ref 2.6–4.7)
PLATELET # BLD AUTO: 288 K/UL (ref 150–400)
PMV BLD AUTO: 8.5 FL (ref 8.9–12.9)
POTASSIUM SERPL-SCNC: 3.9 MMOL/L (ref 3.5–5.1)
RBC # BLD AUTO: 4.13 M/UL (ref 3.8–5.2)
SODIUM SERPL-SCNC: 128 MMOL/L (ref 136–145)
WBC # BLD AUTO: 9.1 K/UL (ref 3.6–11)

## 2021-12-22 PROCEDURE — 74011250637 HC RX REV CODE- 250/637: Performed by: INTERNAL MEDICINE

## 2021-12-22 PROCEDURE — 83735 ASSAY OF MAGNESIUM: CPT

## 2021-12-22 PROCEDURE — 74011250637 HC RX REV CODE- 250/637: Performed by: FAMILY MEDICINE

## 2021-12-22 PROCEDURE — 80048 BASIC METABOLIC PNL TOTAL CA: CPT

## 2021-12-22 PROCEDURE — 84100 ASSAY OF PHOSPHORUS: CPT

## 2021-12-22 PROCEDURE — 85025 COMPLETE CBC W/AUTO DIFF WBC: CPT

## 2021-12-22 PROCEDURE — 36415 COLL VENOUS BLD VENIPUNCTURE: CPT

## 2021-12-22 PROCEDURE — 65660000000 HC RM CCU STEPDOWN

## 2021-12-22 RX ORDER — SPIRONOLACTONE 25 MG/1
50 TABLET ORAL DAILY
Status: DISCONTINUED | OUTPATIENT
Start: 2021-12-22 | End: 2021-12-22

## 2021-12-22 RX ORDER — CARVEDILOL 12.5 MG/1
12.5 TABLET ORAL 2 TIMES DAILY WITH MEALS
Status: DISCONTINUED | OUTPATIENT
Start: 2021-12-22 | End: 2021-12-24

## 2021-12-22 RX ORDER — CHLORTHALIDONE 25 MG/1
50 TABLET ORAL DAILY
Status: DISCONTINUED | OUTPATIENT
Start: 2021-12-23 | End: 2021-12-22

## 2021-12-22 RX ORDER — LOSARTAN POTASSIUM 50 MG/1
100 TABLET ORAL EVERY EVENING
Status: DISCONTINUED | OUTPATIENT
Start: 2021-12-22 | End: 2021-12-27

## 2021-12-22 RX ORDER — SPIRONOLACTONE 25 MG/1
25 TABLET ORAL DAILY
Status: DISCONTINUED | OUTPATIENT
Start: 2021-12-22 | End: 2021-12-23

## 2021-12-22 RX ORDER — SPIRONOLACTONE 25 MG/1
50 TABLET ORAL DAILY
Status: DISCONTINUED | OUTPATIENT
Start: 2021-12-23 | End: 2021-12-22

## 2021-12-22 RX ORDER — CARVEDILOL 12.5 MG/1
25 TABLET ORAL 2 TIMES DAILY WITH MEALS
Status: DISCONTINUED | OUTPATIENT
Start: 2021-12-22 | End: 2021-12-22

## 2021-12-22 RX ADMIN — ACETAMINOPHEN 650 MG: 325 TABLET ORAL at 15:20

## 2021-12-22 RX ADMIN — Medication 10 ML: at 21:36

## 2021-12-22 RX ADMIN — POTASSIUM CHLORIDE 20 MEQ: 750 TABLET, FILM COATED, EXTENDED RELEASE ORAL at 09:40

## 2021-12-22 RX ADMIN — CARVEDILOL 12.5 MG: 12.5 TABLET, FILM COATED ORAL at 09:40

## 2021-12-22 RX ADMIN — ACETAMINOPHEN 650 MG: 325 TABLET ORAL at 21:34

## 2021-12-22 RX ADMIN — ACETAMINOPHEN 650 MG: 325 TABLET ORAL at 00:38

## 2021-12-22 RX ADMIN — ATORVASTATIN CALCIUM 40 MG: 40 TABLET, FILM COATED ORAL at 21:34

## 2021-12-22 RX ADMIN — CARVEDILOL 12.5 MG: 12.5 TABLET, FILM COATED ORAL at 17:52

## 2021-12-22 RX ADMIN — PANTOPRAZOLE SODIUM 40 MG: 40 TABLET, DELAYED RELEASE ORAL at 06:53

## 2021-12-22 RX ADMIN — Medication 10 ML: at 15:00

## 2021-12-22 RX ADMIN — APIXABAN 5 MG: 5 TABLET, FILM COATED ORAL at 09:40

## 2021-12-22 RX ADMIN — PANTOPRAZOLE SODIUM 40 MG: 40 TABLET, DELAYED RELEASE ORAL at 17:52

## 2021-12-22 RX ADMIN — SPIRONOLACTONE 25 MG: 25 TABLET ORAL at 15:00

## 2021-12-22 RX ADMIN — LEVOTHYROXINE SODIUM 75 MCG: 0.07 TABLET ORAL at 06:53

## 2021-12-22 RX ADMIN — LOSARTAN POTASSIUM 100 MG: 50 TABLET, FILM COATED ORAL at 17:52

## 2021-12-22 RX ADMIN — Medication 10 ML: at 06:53

## 2021-12-22 RX ADMIN — APIXABAN 5 MG: 5 TABLET, FILM COATED ORAL at 21:34

## 2021-12-22 RX ADMIN — HYDROCHLOROTHIAZIDE 25 MG: 25 TABLET ORAL at 09:40

## 2021-12-22 NOTE — PROGRESS NOTES
0730: Bedside shift change report given to Leidy Barrios (oncoming nurse) by Camille Seth (offgoing nurse). Report included the following information SBAR, Kardex, ED Summary, Procedure Summary, Intake/Output, MAR and Recent Results. 1510: Orthostatics charted in [address] [place] [address]. 1930: Bedside shift change report given to Marguerite Gonzalez (oncoming nurse) by Leidy Barrios (offgoing nurse). Report included the following information SBAR, Kardex, ED Summary, Procedure Summary, Intake/Output, MAR and Recent Results.

## 2021-12-22 NOTE — PROGRESS NOTES
Cardiology Progress Note                                        Admit Date: 12/19/2021    Assessment/Plan:     1. WCT :   most recent event irreg irregular most consistent with Paroxysmal afib with aberancy . Will increase coreg to 25 bid as before    echo pending   2. 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 Hx PAF :  Resume eliquis was med PTA   4. Uncontrolled htn :  Renal study to eval for LETICIA   ·   Consistent with borderline renal parenchymal disease in the left kidney. · No evidence of hemodynamically significant stenosis of the renal arteries or superior mesenteric artery. 5. Non obstructive cad 4.2019   6. Elevated troponin:    lexiscan MPI     Normal gated rest/stress myocardial perfusion imaging study. No evidence of myocardial ischemia or infarction. Left ventricular ejection fraction is 65 %    7. Back pain   8. Hypotension with bradycardial:  Symptomatic  cw vasovagal response  May have to accept this intermittently to get better bp control :  Increased coreg to 25 bid today   Consider clonidine 0.1 bid              Kary Vargas is a 80 y.o. female with      PROBLEM LIST:  Patient Active Problem List    Diagnosis Date Noted    Hypertensive emergency 12/20/2021    Palpitations 12/19/2021    Chest pain 12/19/2021    LBBB (left bundle branch block) 04/08/2019    Acute chest pain 04/08/2019    Uncontrolled hypertension 04/08/2019         Subjective:     Preston Salvage denies chest pain.     Visit Vitals  BP (!) 209/97 (BP 1 Location: Left upper arm, BP Patient Position: Lying right side)   Pulse 69   Temp 98.1 °F (36.7 °C)   Resp 18   Ht 5' 8\" (1.727 m)   Wt 205 lb 14.6 oz (93.4 kg)   SpO2 95%   BMI 31.31 kg/m²       Intake/Output Summary (Last 24 hours) at 12/22/2021 0946  Last data filed at 12/22/2021 0425  Gross per 24 hour   Intake 500 ml   Output 0 ml   Net 500 ml       Objective:      Physical Exam:  HEENT: Perrla, EOMI  Neck: No JVD,  No thyroidmegaly  Resp: CTA bilaterally;  No wheezes or rales  CV: RRR s1s2 No murmur no s3  Abd:Soft, Nontender  Ext: No edema  Neuro: Alert and oriented; Nonfocal  Skin: Warm, Dry, Intact  Pulses: 2+ DP/PT/Rad      Telemetry: normal sinus rhythm    Current Facility-Administered Medications   Medication Dose Route Frequency    losartan (COZAAR) tablet 50 mg  50 mg Oral QPM    apixaban (ELIQUIS) tablet 5 mg  5 mg Oral BID    carvediloL (COREG) tablet 12.5 mg  12.5 mg Oral BID WITH MEALS    levothyroxine (SYNTHROID) tablet 75 mcg  75 mcg Oral 6am    hydroCHLOROthiazide (HYDRODIURIL) tablet 25 mg  25 mg Oral DAILY    nitroglycerin (NITROSTAT) tablet 0.4 mg  0.4 mg SubLINGual PRN    atorvastatin (LIPITOR) tablet 40 mg  40 mg Oral QHS    pantoprazole (PROTONIX) tablet 40 mg  40 mg Oral ACB&D    traMADoL (ULTRAM) tablet 50 mg  50 mg Oral Q4H PRN    sodium chloride (NS) flush 5-40 mL  5-40 mL IntraVENous Q8H    sodium chloride (NS) flush 5-40 mL  5-40 mL IntraVENous PRN    acetaminophen (TYLENOL) tablet 650 mg  650 mg Oral Q6H PRN    Or    acetaminophen (TYLENOL) suppository 650 mg  650 mg Rectal Q6H PRN    polyethylene glycol (MIRALAX) packet 17 g  17 g Oral DAILY PRN    ondansetron (ZOFRAN ODT) tablet 4 mg  4 mg Oral Q8H PRN    Or    ondansetron (ZOFRAN) injection 4 mg  4 mg IntraVENous Q4H PRN         Data Review:   Labs:    Recent Results (from the past 24 hour(s))   MAGNESIUM    Collection Time: 12/22/21 12:35 AM   Result Value Ref Range    Magnesium 1.8 1.6 - 2.4 mg/dL   METABOLIC PANEL, BASIC    Collection Time: 12/22/21 12:35 AM   Result Value Ref Range    Sodium 128 (L) 136 - 145 mmol/L    Potassium 3.9 3.5 - 5.1 mmol/L    Chloride 95 (L) 97 - 108 mmol/L    CO2 24 21 - 32 mmol/L    Anion gap 9 5 - 15 mmol/L    Glucose 108 (H) 65 - 100 mg/dL    BUN 16 6 - 20 MG/DL    Creatinine 0.84 0.55 - 1.02 MG/DL    BUN/Creatinine ratio 19 12 - 20      GFR est AA >60 >60 ml/min/1.73m2    GFR est non-AA >60 >60 ml/min/1.73m2    Calcium 8.9 8.5 - 10.1 MG/DL   PHOSPHORUS    Collection Time: 12/22/21 12:35 AM   Result Value Ref Range    Phosphorus 3.4 2.6 - 4.7 MG/DL   CBC WITH AUTOMATED DIFF    Collection Time: 12/22/21 12:35 AM   Result Value Ref Range    WBC 9.1 3.6 - 11.0 K/uL    RBC 4.13 3.80 - 5.20 M/uL    HGB 12.7 11.5 - 16.0 g/dL    HCT 37.8 35.0 - 47.0 %    MCV 91.5 80.0 - 99.0 FL    MCH 30.8 26.0 - 34.0 PG    MCHC 33.6 30.0 - 36.5 g/dL    RDW 12.2 11.5 - 14.5 %    PLATELET 574 861 - 752 K/uL    MPV 8.5 (L) 8.9 - 12.9 FL    NRBC 0.0 0  WBC    ABSOLUTE NRBC 0.00 0.00 - 0.01 K/uL    NEUTROPHILS 62 32 - 75 %    LYMPHOCYTES 24 12 - 49 %    MONOCYTES 13 5 - 13 %    EOSINOPHILS 1 0 - 7 %    BASOPHILS 0 0 - 1 %    IMMATURE GRANULOCYTES 0 0.0 - 0.5 %    ABS. NEUTROPHILS 5.5 1.8 - 8.0 K/UL    ABS. LYMPHOCYTES 2.2 0.8 - 3.5 K/UL    ABS. MONOCYTES 1.2 (H) 0.0 - 1.0 K/UL    ABS. EOSINOPHILS 0.1 0.0 - 0.4 K/UL    ABS. BASOPHILS 0.0 0.0 - 0.1 K/UL    ABS. IMM.  GRANS. 0.0 0.00 - 0.04 K/UL    DF AUTOMATED

## 2021-12-22 NOTE — PROGRESS NOTES
1930: Bedside and Verbal shift change report given to THAI Anton (oncoming nurse) by Maryam Enriquez RN (offgoing nurse). Report included the following information SBAR, Kardex, Intake/Output, MAR, Recent Results and Cardiac Rhythm SR.     0024: [facility] called about pt having 3 min of v tach. Assessed pt at this time -- she was sitting on toilet said she felt fine with just \"a little funny feeling\" in her chest. Obtained vitals and labs at this time.  Contacted the hospitalist and he reviewed the strip from monitor and said it looked more like wide complex tachycardia and said to contact cards in the AM.

## 2021-12-22 NOTE — PROGRESS NOTES
CARDIOLOGY NOTE    Reviewed chart. Received sign-out from Dr. Tatyana Pinto. BP remains uncontrolled. But having some orthostatic changes. Change regimen to as follows:  - Carvedilol 12.5 mg BID  - Losartan 100 mg qhs  - Spironolactone 25 mg daily    Thank you for the opportunity to participate in the care of [facility] and please do not hesitate to contact us should you have any questions. Darion Jerez  [facility], [address] [place] [address] [facility]  12/22/21

## 2021-12-22 NOTE — ADT AUTH CERT NOTES
Comments  Comment            Patient Demographics    Patient Name   Nitish Domingo   [id number] Legal Sex   Female    1940 Address   [address] [address] Phone   [phone] (Home)   [phone] (Mobile)     All ADT Orders (ADT stands for Admission Discharge Transfer)  Collapse  Hide  (From admission, onward)  Start   Ordered   21 1600  INITIAL PHYSICIAN ORDER: INPATIENT Telemetry; Yes; 4. Patient requires ICU level of care interventions (further clarification in H&P documentation)  (ADMISSION ORDERS)  ONE TIME        Authorizing Provider: Candelario Call MD    User who entered the order: Candelario Call MD       References:    CLICK HERE-** FAQ-NEW CMS RULES FOR INPATIENT CERTIFICATION **   Question Answer Comment   Status: INPATIENT    Type of Bed Telemetry    Cardiac Monitoring Required? Yes    Inpatient Hospitalization Certified Necessary for the Following Reasons 4. Patient requires ICU level of care interventions (further clarification in H&P documentation)    Admitting Diagnosis Hypertensive emergency    Admitting Physician Parminder Rapp    Attending Physician Niko Llanes    Estimated Length of Stay 3-4 Midnights    Discharge Plan: Home with Office Follow-up        21 1555   21 0645  INITIAL PHYSICIAN ORDER: OBSERVATION/OUTPATIENT IN A BED OBSERVATION; Telemetry; Yes; chest pain, palpitations  (ADMISSION ORDERS)  ONE TIME        Authorizing Provider: Giselle Apodaca MD    User who entered the order: Giselle Apodaca MD       Question Answer Comment   Patient Class: OBSERVATION    Type of Bed Telemetry    Cardiac Monitoring Required?  Yes    Reason for Observation chest pain, palpitations    Admitting Diagnosis Chest pain    Admitting Diagnosis Palpitations    Admitting Physician Jesica Parra    Attending Physician Rafita Steven        21 7941

## 2021-12-22 NOTE — PROGRESS NOTES
Physician Progress Note      PATIENT:               Lois Jain  CSN #:                  [id number]  :                       1940  ADMIT DATE:       2021 1:45 AM  DISCH DATE:  RESPONDING  PROVIDER #:        Rafita Mar MD          QUERY TEXT:    Patient admitted with chest pain, palpitations. Noted documentation of HTN urgency by Fili Calvo in  CN and HTN emergency by Laura Mayen in  PN. This patient received no IV anti-hypertensives that I can see. If possible, please document in progress notes and discharge summary if you are evaluating and /or treating any of the following: The medical record reflects the following:  Risk Factors: HTN    Clinical Indicators:    BPs- 236/92, 209/97, 190/72, 199/80, 185/110    Jonny CN -  Hypertensive urgency  - Change lisinopril to losartan 100 mg qPM  - Change metoprolol to carvedilol 12.5 mg q12h    Blanca Sanchzedave PN -  HTN emergency - with chest pain  - DC metoprolol, lisinopril  - Decrease coreg, losartan and HCTZ to half doses  - Cardiology following    Treatment: Hydralazine 25mg PO; Lisinopril 20mg PO; Lasix 20mg PO; Losartan 100mg PO; Coreg 12.5mg PO    Hypertensive Urgency: Defined as SBP of >180 OR DBP >120 w/o associated organ damage. S/s may or may not be present, but can include severe headache, SOB, epistaxis, severe anxiety. Tx: adjustment of oral BP medication; IV meds not usually required. Hypertensive Emergency: SBP of >180 OR DBP >120 w/ associated organ damage (CVA, MI, acute CHF, CARRILLO, encephalopathy, pulmonary edema, and unstable angina). Documentation should include specific organ affected. Requires immediate treatment, usually with IV meds. Hypertensive Crisis, unspecified: SBP of > 180 OR DBP > 120 and includes damage to blood vessels, including inflammation, leakage of fluid or blood and can cause stroke, headache, heart failure and eclampsia.     Source: [employer] and Quick Reference Guide      Thank you,  Kit Elizabeth RN, BSN, [address] [place] [address]  Clinical Documentation  [phone] or [phone]  Options provided:  -- Hypertensive Emergency  -- Hypertensive Urgency  -- Other - I will add my own diagnosis  -- Disagree - Not applicable / Not valid  -- Disagree - Clinically unable to determine / Unknown  -- Refer to Clinical Documentation Reviewer    PROVIDER RESPONSE TEXT:    This patient is in hypertensive emergency.     Query created by: Sebastien Owen on 12/22/2021 8:33 AM      Electronically signed by:  Ruth Crow MD 12/22/2021 5:02 PM

## 2021-12-22 NOTE — PROGRESS NOTES
[facility]  Hospitalist Group                                                                                          Hospitalist Progress Note  Raffi Cantu MD  Answering service: [phone] OR [phone] from in house phone        Date of Service:  2021  NAME:  Priscila Key  :  1940  MRN:  [id number]      Admission Summary:   69y/o female with pmh of HLD, HTN who presented with recurrent chest pain in the context of HTN emergency. Pateint presented to [facility] ER twice with BPs that were elevated and associated with chest pain. Her first visit, she was started on additional BP medications, and discharged after having neg enzymes, and neg EKG. She represented with additionally elevated BPs; and required admission. Interval history / Subjective:   Pt with systolic in 224 this am prior to meds, repeated afterwards. Denies any chest pain or discomfort      Assessment & Plan:     Chest pain  - Stress test negative  - Troponins negative  - EKG non focal   - Cardiology following      HTN emergency - with chest pain   Orthostatic hypotension   - DC home metoprolol, lisinopril  - Decrease coreg 12.5mg BID, losartan increased back to 100, and on spironolactone 25mg.   - Repeat orthostatic later this afternoon. We may need to accept a higher supine BP, so the patient can tolerate being in standing position.   - Cardiology following    H/o Afib - eliquis, coreg      GERD - start PPI   Hypothyroidism - levothyroxine  Mild hyponatremia     Code status: FULL  DVT prophylaxis: Eliquis     Care Plan discussed with: Patient/Family  Anticipated Disposition: Home w/Family  Anticipated Discharge: Less than 24 hours, If bp reasonably controlled, and pt not orthostatic.        Hospital Problems  Date Reviewed: 2021          Codes Class Noted POA    Hypertensive emergency ICD-10-CM: I16.1  ICD-9-CM: 401.9  2021 Unknown        Palpitations ICD-10-CM: R00.2  ICD-9-CM: 785.1 12/19/2021 Unknown        Chest pain ICD-10-CM: R07.9  ICD-9-CM: 786.50  12/19/2021 Unknown                Review of Systems:   A comprehensive review of systems was negative except for that written in the HPI. Vital Signs:    Last 24hrs VS reviewed since prior progress note. Most recent are:  Visit Vitals  BP (!) 161/98 (BP 1 Location: Left upper arm, BP Patient Position: Sitting)   Pulse 61   Temp 97.6 °F (36.4 °C)   Resp 16   Ht 5' 8\" (1.727 m)   Wt 93.4 kg (205 lb 14.6 oz)   SpO2 100%   BMI 31.31 kg/m²         Intake/Output Summary (Last 24 hours) at 12/22/2021 1454  Last data filed at 12/22/2021 0425  Gross per 24 hour   Intake 500 ml   Output 0 ml   Net 500 ml        Physical Examination:     I had a face to face encounter with this patient and independently examined them on 12/22/2021 as outlined below:          Constitutional:  No acute distress, cooperative, pleasant    ENT:  Oral mucosa moist, oropharynx benign. Resp:  CTA bilaterally. No wheezing/rhonchi/rales. No accessory muscle use   CV:  Regular rhythm, normal rate, no murmurs, gallops, rubs    GI:  Soft, non distended, non tender. normoactive bowel sounds, no hepatosplenomegaly     Musculoskeletal:  No edema, warm, 2+ pulses throughout     Neurologic:  Moves all extremities. AAOx3, CN II-XII reviewed             Data Review:    Review and/or order of clinical lab test  Review and/or order of tests in the radiology section of CPT  Review and/or order of tests in the medicine section of CPT      Labs:     Recent Labs     12/22/21  0035   WBC 9.1   HGB 12.7   HCT 37.8        Recent Labs     12/22/21  0035   *   K 3.9   CL 95*   CO2 24   BUN 16   CREA 0.84   *   CA 8.9   MG 1.8   PHOS 3.4     No results for input(s): ALT, AP, TBIL, TBILI, TP, ALB, GLOB, GGT, AML, LPSE in the last 72 hours. No lab exists for component: SGOT, GPT, AMYP, HLPSE  No results for input(s): INR, PTP, APTT, INREXT, INREXT in the last 72 hours. No results for input(s): FE, TIBC, PSAT, FERR in the last 72 hours. No results found for: FOL, RBCF   No results for input(s): PH, PCO2, PO2 in the last 72 hours. No results for input(s): CPK, CKNDX, TROIQ in the last 72 hours.     No lab exists for component: CPKMB  No results found for: CHOL, CHOLX, CHLST, CHOLV, HDL, HDLP, LDL, LDLC, DLDLP, TGLX, TRIGL, TRIGP, CHHD, CHHDX  No results found for: GLUCPOC  No results found for: COLOR, APPRN, SPGRU, REFSG, EDWARD, PROTU, GLUCU, KETU, BILU, UROU, EARL, LEUKU, GLUKE, EPSU, BACTU, WBCU, RBCU, CASTS, UCRY      Medications Reviewed:     Current Facility-Administered Medications   Medication Dose Route Frequency    losartan (COZAAR) tablet 100 mg  100 mg Oral QPM    carvediloL (COREG) tablet 12.5 mg  12.5 mg Oral BID WITH MEALS    spironolactone (ALDACTONE) tablet 25 mg  25 mg Oral DAILY    apixaban (ELIQUIS) tablet 5 mg  5 mg Oral BID    levothyroxine (SYNTHROID) tablet 75 mcg  75 mcg Oral 6am    nitroglycerin (NITROSTAT) tablet 0.4 mg  0.4 mg SubLINGual PRN    atorvastatin (LIPITOR) tablet 40 mg  40 mg Oral QHS    pantoprazole (PROTONIX) tablet 40 mg  40 mg Oral ACB&D    traMADoL (ULTRAM) tablet 50 mg  50 mg Oral Q4H PRN    sodium chloride (NS) flush 5-40 mL  5-40 mL IntraVENous Q8H    sodium chloride (NS) flush 5-40 mL  5-40 mL IntraVENous PRN    acetaminophen (TYLENOL) tablet 650 mg  650 mg Oral Q6H PRN    Or    acetaminophen (TYLENOL) suppository 650 mg  650 mg Rectal Q6H PRN    polyethylene glycol (MIRALAX) packet 17 g  17 g Oral DAILY PRN    ondansetron (ZOFRAN ODT) tablet 4 mg  4 mg Oral Q8H PRN    Or    ondansetron (ZOFRAN) injection 4 mg  4 mg IntraVENous Q4H PRN     ______________________________________________________________________  EXPECTED LENGTH OF STAY: 2d 4h  ACTUAL LENGTH OF STAY:          1                 Beanene MD Vinayak

## 2021-12-23 ENCOUNTER — APPOINTMENT (OUTPATIENT)
Dept: GENERAL RADIOLOGY | Age: 81
DRG: 305 | End: 2021-12-23
Attending: STUDENT IN AN ORGANIZED HEALTH CARE EDUCATION/TRAINING PROGRAM
Payer: MEDICARE

## 2021-12-23 ENCOUNTER — APPOINTMENT (OUTPATIENT)
Dept: NON INVASIVE DIAGNOSTICS | Age: 81
DRG: 305 | End: 2021-12-23
Attending: INTERNAL MEDICINE
Payer: MEDICARE

## 2021-12-23 LAB
ABDOMINAL PROX AORTA VEL: 107.2 CM/S
ANION GAP SERPL CALC-SCNC: 8 MMOL/L (ref 5–15)
BASOPHILS # BLD: 0 K/UL (ref 0–0.1)
BASOPHILS NFR BLD: 0 % (ref 0–1)
BUN SERPL-MCNC: 15 MG/DL (ref 6–20)
BUN/CREAT SERPL: 16 (ref 12–20)
CALCIUM SERPL-MCNC: 9.2 MG/DL (ref 8.5–10.1)
CHLORIDE SERPL-SCNC: 92 MMOL/L (ref 97–108)
CO2 SERPL-SCNC: 24 MMOL/L (ref 21–32)
CREAT SERPL-MCNC: 0.95 MG/DL (ref 0.55–1.02)
DIFFERENTIAL METHOD BLD: ABNORMAL
EOSINOPHIL # BLD: 0.1 K/UL (ref 0–0.4)
EOSINOPHIL NFR BLD: 1 % (ref 0–7)
ERYTHROCYTE [DISTWIDTH] IN BLOOD BY AUTOMATED COUNT: 12.2 % (ref 11.5–14.5)
GLUCOSE SERPL-MCNC: 119 MG/DL (ref 65–100)
HCT VFR BLD AUTO: 37.7 % (ref 35–47)
HGB BLD-MCNC: 12.8 G/DL (ref 11.5–16)
IMM GRANULOCYTES # BLD AUTO: 0 K/UL (ref 0–0.04)
IMM GRANULOCYTES NFR BLD AUTO: 0 % (ref 0–0.5)
LEFT KIDNEY LENGTH: 9.03 CM
LEFT KIDNEY WIDTH: 5.34 CM
LEFT RENAL DIST DIAS: 13 CM/S
LEFT RENAL DIST RAR: 0.58
LEFT RENAL DIST RI: 0.79
LEFT RENAL DIST SYS: 61.9 CM/S
LEFT RENAL LOWER PARENCHYMA MAX: 29.1 CM/S
LEFT RENAL LOWER PARENCHYMA MIN: 6.9 CM/S
LEFT RENAL LOWER PARENCHYMA RI: 0.76
LEFT RENAL MID DIAS: 12.8 CM/S
LEFT RENAL MID RAR: 0.73
LEFT RENAL MID RI: 0.84
LEFT RENAL MID SYS: 77.8 CM/S
LEFT RENAL MIDDLE PARENCHYMA MAX: 24.1 CM/S
LEFT RENAL MIDDLE PARENCHYMA MIN: 7.6 CM/S
LEFT RENAL MIDDLE PARENCHYMA RI: 0.68
LEFT RENAL PROX DIAS: 18.8 CM/S
LEFT RENAL PROX RAR: 0.88
LEFT RENAL PROX RI: 0.8
LEFT RENAL PROX SYS: 93.8 CM/S
LEFT RENAL UPPER PARENCHYMA MAX: 26.6 CM/S
LEFT RENAL UPPER PARENCHYMA MIN: 7.6 CM/S
LEFT RENAL UPPER PARENCHYMA RI: 0.71
LYMPHOCYTES # BLD: 1.9 K/UL (ref 0.8–3.5)
LYMPHOCYTES NFR BLD: 20 % (ref 12–49)
MAGNESIUM SERPL-MCNC: 1.6 MG/DL (ref 1.6–2.4)
MCH RBC QN AUTO: 30.8 PG (ref 26–34)
MCHC RBC AUTO-ENTMCNC: 34 G/DL (ref 30–36.5)
MCV RBC AUTO: 90.6 FL (ref 80–99)
MONOCYTES # BLD: 1.2 K/UL (ref 0–1)
MONOCYTES NFR BLD: 13 % (ref 5–13)
NEUTS SEG # BLD: 6.2 K/UL (ref 1.8–8)
NEUTS SEG NFR BLD: 66 % (ref 32–75)
NRBC # BLD: 0 K/UL (ref 0–0.01)
NRBC BLD-RTO: 0 PER 100 WBC
PHOSPHATE SERPL-MCNC: 3 MG/DL (ref 2.6–4.7)
PLATELET # BLD AUTO: 309 K/UL (ref 150–400)
PMV BLD AUTO: 8.5 FL (ref 8.9–12.9)
POTASSIUM SERPL-SCNC: 3.6 MMOL/L (ref 3.5–5.1)
PROX AORTIC AP: 1.95 CM
PROX SMA EDV: 18.3 CM/S
PROX SMA PSV: 191.6 CM/S
RBC # BLD AUTO: 4.16 M/UL (ref 3.8–5.2)
RIGHT KIDNEY LENGTH: 9.98 CM
RIGHT KIDNEY WIDTH: 5.33 CM
RIGHT RENAL DIST DIAS: 11.8 CM/S
RIGHT RENAL DIST RAR: 0.56
RIGHT RENAL DIST RI: 0.8
RIGHT RENAL DIST SYS: 60.2 CM/S
RIGHT RENAL LOWER PARENCHYMA MAX: 18.8 CM/S
RIGHT RENAL LOWER PARENCHYMA MIN: 6.5 CM/S
RIGHT RENAL LOWER PARENCHYMA RI: 0.65
RIGHT RENAL MID DIAS: 21.7 CM/S
RIGHT RENAL MID RAR: 0.7
RIGHT RENAL MID RI: 0.71
RIGHT RENAL MID SYS: 74.7 CM/S
RIGHT RENAL MIDDLE PARENCHYMA MAX: 21.2 CM/S
RIGHT RENAL MIDDLE PARENCHYMA MIN: 6.5 CM/S
RIGHT RENAL MIDDLE PARENCHYMA RI: 0.69
RIGHT RENAL PROX DIAS: 15.9 CM/S
RIGHT RENAL PROX RAR: 0.76
RIGHT RENAL PROX RI: 0.81
RIGHT RENAL PROX SYS: 81.8 CM/S
RIGHT RENAL UPPER PARENCHYMA MAX: 21.2 CM/S
RIGHT RENAL UPPER PARENCHYMA MIN: 7.1 CM/S
RIGHT RENAL UPPER PARENCHYMA RI: 0.67
SODIUM SERPL-SCNC: 124 MMOL/L (ref 136–145)
TROPONIN-HIGH SENSITIVITY: 101 NG/L (ref 0–51)
TROPONIN-HIGH SENSITIVITY: 17 NG/L (ref 0–51)
WBC # BLD AUTO: 9.4 K/UL (ref 3.6–11)

## 2021-12-23 PROCEDURE — 80048 BASIC METABOLIC PNL TOTAL CA: CPT

## 2021-12-23 PROCEDURE — 74011250637 HC RX REV CODE- 250/637: Performed by: INTERNAL MEDICINE

## 2021-12-23 PROCEDURE — 65660000000 HC RM CCU STEPDOWN

## 2021-12-23 PROCEDURE — 74011000258 HC RX REV CODE- 258: Performed by: INTERNAL MEDICINE

## 2021-12-23 PROCEDURE — 97530 THERAPEUTIC ACTIVITIES: CPT

## 2021-12-23 PROCEDURE — 93005 ELECTROCARDIOGRAM TRACING: CPT

## 2021-12-23 PROCEDURE — 71045 X-RAY EXAM CHEST 1 VIEW: CPT

## 2021-12-23 PROCEDURE — 85025 COMPLETE CBC W/AUTO DIFF WBC: CPT

## 2021-12-23 PROCEDURE — 83735 ASSAY OF MAGNESIUM: CPT

## 2021-12-23 PROCEDURE — 36415 COLL VENOUS BLD VENIPUNCTURE: CPT

## 2021-12-23 PROCEDURE — 84484 ASSAY OF TROPONIN QUANT: CPT

## 2021-12-23 PROCEDURE — 74011250636 HC RX REV CODE- 250/636: Performed by: INTERNAL MEDICINE

## 2021-12-23 PROCEDURE — 97161 PT EVAL LOW COMPLEX 20 MIN: CPT

## 2021-12-23 PROCEDURE — 74011250636 HC RX REV CODE- 250/636: Performed by: STUDENT IN AN ORGANIZED HEALTH CARE EDUCATION/TRAINING PROGRAM

## 2021-12-23 PROCEDURE — 74011250637 HC RX REV CODE- 250/637: Performed by: FAMILY MEDICINE

## 2021-12-23 PROCEDURE — 93306 TTE W/DOPPLER COMPLETE: CPT

## 2021-12-23 PROCEDURE — 84100 ASSAY OF PHOSPHORUS: CPT

## 2021-12-23 RX ORDER — SPIRONOLACTONE 25 MG/1
25 TABLET ORAL
Status: COMPLETED | OUTPATIENT
Start: 2021-12-23 | End: 2021-12-23

## 2021-12-23 RX ORDER — AMIODARONE HYDROCHLORIDE 200 MG/1
400 TABLET ORAL 3 TIMES DAILY
Status: DISCONTINUED | OUTPATIENT
Start: 2021-12-23 | End: 2021-12-24

## 2021-12-23 RX ORDER — MAGNESIUM SULFATE HEPTAHYDRATE 40 MG/ML
2 INJECTION, SOLUTION INTRAVENOUS ONCE
Status: COMPLETED | OUTPATIENT
Start: 2021-12-23 | End: 2021-12-23

## 2021-12-23 RX ORDER — SPIRONOLACTONE 25 MG/1
50 TABLET ORAL DAILY
Status: DISCONTINUED | OUTPATIENT
Start: 2021-12-24 | End: 2021-12-25

## 2021-12-23 RX ORDER — HYDRALAZINE HYDROCHLORIDE 20 MG/ML
20 INJECTION INTRAMUSCULAR; INTRAVENOUS
Status: DISCONTINUED | OUTPATIENT
Start: 2021-12-23 | End: 2021-12-24

## 2021-12-23 RX ORDER — MORPHINE SULFATE 2 MG/ML
1 INJECTION, SOLUTION INTRAMUSCULAR; INTRAVENOUS
Status: DISCONTINUED | OUTPATIENT
Start: 2021-12-23 | End: 2021-12-29 | Stop reason: HOSPADM

## 2021-12-23 RX ORDER — DILTIAZEM HYDROCHLORIDE 5 MG/ML
20 INJECTION INTRAVENOUS ONCE
Status: DISCONTINUED | OUTPATIENT
Start: 2021-12-23 | End: 2021-12-23

## 2021-12-23 RX ADMIN — Medication 10 ML: at 22:35

## 2021-12-23 RX ADMIN — AMIODARONE HYDROCHLORIDE 400 MG: 200 TABLET ORAL at 22:34

## 2021-12-23 RX ADMIN — MAGNESIUM SULFATE IN WATER 2 G: 40 INJECTION, SOLUTION INTRAVENOUS at 03:53

## 2021-12-23 RX ADMIN — PANTOPRAZOLE SODIUM 40 MG: 40 TABLET, DELAYED RELEASE ORAL at 15:41

## 2021-12-23 RX ADMIN — LOSARTAN POTASSIUM 100 MG: 50 TABLET, FILM COATED ORAL at 17:18

## 2021-12-23 RX ADMIN — AMIODARONE HYDROCHLORIDE 400 MG: 200 TABLET ORAL at 11:34

## 2021-12-23 RX ADMIN — Medication 10 ML: at 05:31

## 2021-12-23 RX ADMIN — DEXTROSE 150 MG: 50 INJECTION, SOLUTION INTRAVENOUS at 04:12

## 2021-12-23 RX ADMIN — HYDRALAZINE HYDROCHLORIDE 20 MG: 20 INJECTION INTRAMUSCULAR; INTRAVENOUS at 01:26

## 2021-12-23 RX ADMIN — AMIODARONE HYDROCHLORIDE 400 MG: 200 TABLET ORAL at 15:41

## 2021-12-23 RX ADMIN — CARVEDILOL 12.5 MG: 12.5 TABLET, FILM COATED ORAL at 09:02

## 2021-12-23 RX ADMIN — AMIODARONE HYDROCHLORIDE 1 MG/MIN: 50 INJECTION, SOLUTION INTRAVENOUS at 04:00

## 2021-12-23 RX ADMIN — LEVOTHYROXINE SODIUM 75 MCG: 0.07 TABLET ORAL at 06:58

## 2021-12-23 RX ADMIN — PANTOPRAZOLE SODIUM 40 MG: 40 TABLET, DELAYED RELEASE ORAL at 09:02

## 2021-12-23 RX ADMIN — CARVEDILOL 12.5 MG: 12.5 TABLET, FILM COATED ORAL at 17:18

## 2021-12-23 RX ADMIN — SPIRONOLACTONE 25 MG: 25 TABLET ORAL at 09:02

## 2021-12-23 RX ADMIN — SPIRONOLACTONE 25 MG: 25 TABLET ORAL at 11:34

## 2021-12-23 RX ADMIN — APIXABAN 5 MG: 5 TABLET, FILM COATED ORAL at 21:03

## 2021-12-23 RX ADMIN — APIXABAN 5 MG: 5 TABLET, FILM COATED ORAL at 09:02

## 2021-12-23 RX ADMIN — SODIUM CHLORIDE 500 ML: 9 INJECTION, SOLUTION INTRAVENOUS at 03:00

## 2021-12-23 RX ADMIN — Medication 10 ML: at 15:41

## 2021-12-23 RX ADMIN — ACETAMINOPHEN 650 MG: 325 TABLET ORAL at 11:34

## 2021-12-23 RX ADMIN — ATORVASTATIN CALCIUM 40 MG: 40 TABLET, FILM COATED ORAL at 22:35

## 2021-12-23 NOTE — PROGRESS NOTES
Called to evaluate patient for chest pain and hypotension. Patient is a 80 F w/ hx of paroxysmal afib with aberancy, patient also had history of stress cardiomyopathy diagnosed in 2019, RRT called don 12/23/2021 for active chest pain, afib RVR and hypotension. EKG shows new left bundle in the anterior leads compared to EKG from 12/19/2021. Patient also has 8/10 active chest pain with MAP in the 50's. Patient Is being followed by Dr. Melissa Adams and Dr. Diana Jerome. Patient had history of stress cardiomyopathy and at the time also presented with new Left bundle, at the time patient was taken to [facility] and has no complete occlusion. After discussion with Dr. Wiliam Holcomb, will start patient on amiodarone gtt, magnesium, and morphine for chest pain. Patient is on active anticoagulation with eliquis. EKG/Troponin q3h.  Cariology to follow up in AM.     Continue Management in [id number] Jimy May MD  CCM

## 2021-12-23 NOTE — PROGRESS NOTES
1930:Bedside and Verbal shift change report given to Sukhjinder Hector and Cony RN (oncoming nurse) by Fidelina Mcfadden (offgoing nurse). Report included the following information SBAR, Kardex, ED Summary, Recent Results and Cardiac Rhythm sinus rhythm. 0126: PRN hydralazine was given for /104 bp recheck 40 min later bp 165/75.    0300:rapid called see Omayra Park RNs note. 0730: Bedside and Verbal shift change report given to [facility][note 3] (oncoming nurse) by Sweetie Morataya RN (offgoing nurse). Report included the following information SBAR, Kardex, ED Summary, Procedure Summary, Med Rec Status and Cardiac Rhythm Sinus rhythm. Problem: Falls - Risk of  Goal: *Absence of Falls  Description: Document Krishna Cease Fall Risk and appropriate interventions in the flowsheet.   Outcome: Progressing Towards Goal  Note: Fall Risk Interventions:            Medication Interventions: Evaluate medications/consider consulting pharmacy    Elimination Interventions: Call light in reach              Problem: Patient Education: Go to Patient Education Activity  Goal: Patient/Family Education  Outcome: Progressing Towards Goal     Problem: Hypertension  Goal: *Blood pressure within specified parameters  Outcome: Progressing Towards Goal     Problem: Patient Education: Go to Patient Education Activity  Goal: Patient/Family Education  Outcome: Progressing Towards Goal

## 2021-12-23 NOTE — PROGRESS NOTES
Provided pastoral care visit to [facility] 5 patient. Did not include sacramental care.     Divina Becerra

## 2021-12-23 NOTE — PROGRESS NOTES
0110: Contacted Dr. Neal Gómez regarding elevated /85; Verbal order received for 20 mg IV hydralazine Q6H PRN.    0126: Hydralazine administered per PRN order. BP is 221/104.    0205: patient states she has increased, \"more intense\" spasms in her leg. She does not want pain medicine. 0240: patient's rhythm converted to wide QRS morphology, appearing to be VTach; patient complaining of feeeling \"strange\" and stating \"something's not right\". /65. Rapid Response called. 0245:EKG obtained=A FIB with RVR    0305: Dr. Neal Gómez at bedside; orders placed (see MAR)    0320: labs obtained    0326 BP 59/40. Patient symptomatic stating she feels \"dizzy\" and \"not right\" and complaining of chest pain    0330: Cardiology paged, spoke with Dr. Malia Shetty who recommended cardioversion if BP continues to stay low. Interventionist, Dr. Trinity Wyatt consulted. 4671: Dr. Trinity Wyatt at bedside. 0350: /61     0410: amiodarone bolus administered and amiodarone gtt initiated per order (see MAR)    0425: patient resting comfortably; states she's ready to sleep. States she no longer has chest pain. 5506: patient converted to NSR    ----  Charting and patient care of Abby Narayanan by Per Dumas RN from 3223 to 0800 was supervised and reviewed by this RN.     Urvashi Contreras RN

## 2021-12-23 NOTE — PROGRESS NOTES
[facility] Cardiology Progress Note    Date of Service: 12/23/2021    Subjective:  Overnight events noted. Went into AF with RVR and aberrant conduction associated with chest pain after she received IV hydralazine for severe HTN. Pain lasted for about 1 hour. Has been chest pain free since. Started on amiodarone drip and converted to sinus rhythm at 06:53 (as in AF from 02:41 to 06:54). Denies chest pain currently. No dyspnea, lightheadedness, syncope. Son at bedside.     Objective:    Visit Vitals  BP (!) 147/78 (BP 1 Location: Right arm, BP Patient Position: At rest)   Pulse 67   Temp 98.5 °F (36.9 °C)   Resp 18   Ht 5' 8\" (1.727 m)   Wt 94.2 kg (207 lb 10.8 oz)   SpO2 95%   BMI 31.58 kg/m²         Intake/Output Summary (Last 24 hours) at 12/23/2021 1036  Last data filed at 12/23/2021 0751  Gross per 24 hour   Intake 480 ml   Output 200 ml   Net 280 ml        Physical Exam  GEN: NAD, appears stated age  HEENT: EOMI, MMM, OP clear  NECK: Normal JVP  CV: RRR, normal S1 and S2, no M/R/G  LUNGS: CTAB, no W/R/R  ABD: NABS, soft, NT/ND  EXT: No edema, 2+ distal pulses  PSYCH: Mood and affect normal  NEURO: AAO, MAEW, face symmetrical, speech intact    Current Facility-Administered Medications   Medication Dose Route Frequency    hydrALAZINE (APRESOLINE) 20 mg/mL injection 20 mg  20 mg IntraVENous Q6H PRN    amiodarone (CORDARONE) 375 mg/250 mL D5W infusion  0.5-1 mg/min IntraVENous TITRATE    morphine injection 1 mg  1 mg IntraVENous Q3H PRN    losartan (COZAAR) tablet 100 mg  100 mg Oral QPM    carvediloL (COREG) tablet 12.5 mg  12.5 mg Oral BID WITH MEALS    spironolactone (ALDACTONE) tablet 25 mg  25 mg Oral DAILY    apixaban (ELIQUIS) tablet 5 mg  5 mg Oral BID    levothyroxine (SYNTHROID) tablet 75 mcg  75 mcg Oral 6am    nitroglycerin (NITROSTAT) tablet 0.4 mg  0.4 mg SubLINGual PRN    atorvastatin (LIPITOR) tablet 40 mg  40 mg Oral QHS    pantoprazole (PROTONIX) tablet 40 mg  40 mg Oral ACB&D    traMADoL (ULTRAM) tablet 50 mg  50 mg Oral Q4H PRN    sodium chloride (NS) flush 5-40 mL  5-40 mL IntraVENous Q8H    sodium chloride (NS) flush 5-40 mL  5-40 mL IntraVENous PRN    acetaminophen (TYLENOL) tablet 650 mg  650 mg Oral Q6H PRN    Or    acetaminophen (TYLENOL) suppository 650 mg  650 mg Rectal Q6H PRN    polyethylene glycol (MIRALAX) packet 17 g  17 g Oral DAILY PRN    ondansetron (ZOFRAN ODT) tablet 4 mg  4 mg Oral Q8H PRN    Or    ondansetron (ZOFRAN) injection 4 mg  4 mg IntraVENous Q4H PRN       Data Reviewed:  Recent Labs     12/23/21 0319 12/22/21  0035   * 128*   K 3.6 3.9   CL 92* 95*   CO2 24 24   * 108*   BUN 15 16   CREA 0.95 0.84   CA 9.2 8.9   MG 1.6 1.8   PHOS 3.0 3.4     Recent Labs     12/23/21 0319 12/22/21  0035   WBC 9.4 9.1   HGB 12.8 12.7   HCT 37.7 37.8    288     No results found for: SDES  No results found for: CULT    All Cardiac Markers in the last 24 hours:  No results found for: CPK, CK, CKMMB, CKMB, RCK3, CKMBT, CKMBPOC, CKNDX, CKND1, PIO, TROPT, TROIQ, HEATHER, TROPT, TNIPOC, BNP, BNPP, BNPNT    Telemetry (personally reviewed): AF with aberrancy with HR in 120s from 02:41 to 06:53; sinus rhythm since    Assessment:  1. pAF with aberrancy with RVR; currently sinus rhythm   - Event overnight 12/22 to 12/23 was likely precipitated by IV hydralazine   - Very symptomatic with chest pain   - Lexiscan MPI 12/20/21 was normal   - Normal coronaries 4/8/19  2. Uncontrolled HTN  3. Non-obstructive CAD  4.  Hypotension    - Seems to be positional   - Avoid medications that decrease intravascular volume   - Will try to use arterial vasodilators and avoid drugs that more specifically effect preload and HR   - Push losartan, spironolactone   - Keep BB where it is   - Avoid HCTZ      - May have to tolerate a slightly higher BP while supine/sitting to avoid hypotension with standing   - WOULD NOT GIVE IV HYDRALAZINE (known to cause chest pain without concomitant nitroglycerin and can produce arrhythmias)    Plan:  - Stop amiodarone drip  - Start amiodarone 400 mg TID for 5 days and then decrease to 200 mg po daily  - Follow-up with Dr. Akash Fisher to consider AF ablation given she is VERY symptomatic with AF  - Stay on amiodarone until after ablation is considered/completed  - Continue Eliquis 5 mg q12h  - Continue carvedilol 12.5 mg BID; would not push dose higher  - Continue losartan 100 mg qPM  - Increase spironolactone to 50 mg daily  - Avoid HCTZ, IV hydralazine, things that drop preload  - Would increase spironolactone if additional BP control is needed  - When sleeping keep HOB elevated to 30 degrees to help decrease supine hypertension  - Will sign out to my colleague who will see her tomorrow    Thank you for the opportunity to participate in the care of Abeba Urias and please do not hesitate to contact us should you have any questions. Signed:  Miriam Narayanan.  Bre Ratliff, [address] [facility] / [address] [place], [place] [address]  12/23/21

## 2021-12-23 NOTE — PROGRESS NOTES
[address] [facility]  Hospitalist Group                                                                                          Hospitalist Progress Note  Robert Terrell MD  Answering service: [phone] from in house phone        Date of Service:  2021  NAME:  Sonia Rai  :  1940  MRN:  [id number]      Admission Summary:   71y/o female with pmh of HLD, HTN who presented with recurrent chest pain in the context of HTN emergency. Pateint presented to short [facility] ER twice with BPs that were elevated and associated with chest pain. Her first visit, she was started on additional BP medications, and discharged after having neg enzymes, and neg EKG. She represented with additionally elevated BPs; and required admission. Interval history / Subjective:     Patient seen and examined today, doing fine, has no chest pain or shortness of breath. Last night patient started having chest pain and a blood pressure initially increased and dropped on suddenly resulting in arrhythmiaA. fib and was started on amiodarone drip. Assessment & Plan:     Chest pain  - Stress test negative  - Troponins negative  - EKG non focal   - Cardiology following   Last night patient had chest pain along with A. fib episode which resolved with movement.     HTN emergency - with chest pain   Orthostatic hypotension   - DC home metoproloL  - Decrease coreg 12.5mg BID, losartan increased back to 100, and on spironolactone 25mg.   -Discussed with cardiology in detailwe will keep the head of the bed elevated 30 degrees to decrease supine hypertension.  - Cardiology following    A. fib with RVR  Resolved, start on amiodarone 40 mg 3 times daily for 5 days and then decrease to 200 mg daily.   Already on Eliquis and Coreg.     GERD - start PPI   Hypothyroidism - levothyroxine  Mild hyponatremia     Code status: FULL  DVT prophylaxis: Eliquis     Care Plan discussed with: Patient/Family  Anticipated Disposition: Home w/Family  Anticipated Discharge: 24 to 48 hours if the heart rate and the blood pressure remained stable. Hospital Problems  Date Reviewed: 12/20/2021          Codes Class Noted POA    Hypertensive emergency ICD-10-CM: I16.1  ICD-9-CM: 401.9  12/20/2021 Unknown        Palpitations ICD-10-CM: R00.2  ICD-9-CM: 785.1  12/19/2021 Unknown        Chest pain ICD-10-CM: R07.9  ICD-9-CM: 786.50  12/19/2021 Unknown                Review of Systems:   A comprehensive review of systems was negative except for that written in the HPI. Vital Signs:    Last 24hrs VS reviewed since prior progress note. Most recent are:  Visit Vitals  BP (!) 141/72   Pulse 63   Temp 97.8 °F (36.6 °C)   Resp 18   Ht 5' 8\" (1.727 m)   Wt 94.2 kg (207 lb 10.8 oz)   SpO2 99%   BMI 31.58 kg/m²         Intake/Output Summary (Last 24 hours) at 12/23/2021 1411  Last data filed at 12/23/2021 1307  Gross per 24 hour   Intake 480 ml   Output 200 ml   Net 280 ml        Physical Examination:     I had a face to face encounter with this patient and independently examined them on 12/23/2021 as outlined below:          Constitutional:  No acute distress, cooperative, pleasant    ENT:  Oral mucosa moist, oropharynx benign. Resp:  CTA bilaterally. No wheezing/rhonchi/rales. No accessory muscle use   CV:  Regular rhythm, normal rate, no murmurs, gallops, rubs    GI:  Soft, non distended, non tender. normoactive bowel sounds, no hepatosplenomegaly     Musculoskeletal:  No edema, warm, 2+ pulses throughout     Neurologic:  Moves all extremities.   AAOx3, CN II-XII reviewed             Data Review:    Review and/or order of clinical lab test  Review and/or order of tests in the radiology section of CPT  Review and/or order of tests in the medicine section of CPT      Labs:     Recent Labs     12/23/21 0319 12/22/21 0035   WBC 9.4 9.1   HGB 12.8 12.7   HCT 37.7 37.8    288     Recent Labs     12/23/21 0319 12/22/21  0035   * 128*   K 3.6 3.9   CL 92* 95*   CO2 24 24   BUN 15 16   CREA 0.95 0.84   * 108*   CA 9.2 8.9   MG 1.6 1.8   PHOS 3.0 3.4     No results for input(s): ALT, AP, TBIL, TBILI, TP, ALB, GLOB, GGT, AML, LPSE in the last 72 hours. No lab exists for component: SGOT, GPT, AMYP, HLPSE  No results for input(s): INR, PTP, APTT, INREXT, INREXT in the last 72 hours. No results for input(s): FE, TIBC, PSAT, FERR in the last 72 hours. No results found for: FOL, RBCF   No results for input(s): PH, PCO2, PO2 in the last 72 hours. No results for input(s): CPK, CKNDX, TROIQ in the last 72 hours.     No lab exists for component: CPKMB  No results found for: CHOL, CHOLX, CHLST, CHOLV, HDL, HDLP, LDL, LDLC, DLDLP, TGLX, TRIGL, TRIGP, CHHD, CHHDX  No results found for: GLUCPOC  No results found for: COLOR, APPRN, SPGRU, REFSG, EDWARD, PROTU, GLUCU, KETU, BILU, UROU, EARL, LEUKU, GLUKE, EPSU, BACTU, WBCU, RBCU, CASTS, UCRY      Medications Reviewed:     Current Facility-Administered Medications   Medication Dose Route Frequency    hydrALAZINE (APRESOLINE) 20 mg/mL injection 20 mg  20 mg IntraVENous Q6H PRN    morphine injection 1 mg  1 mg IntraVENous Q3H PRN    amiodarone (CORDARONE) tablet 400 mg  400 mg Oral TID    [START ON 12/24/2021] spironolactone (ALDACTONE) tablet 50 mg  50 mg Oral DAILY    losartan (COZAAR) tablet 100 mg  100 mg Oral QPM    carvediloL (COREG) tablet 12.5 mg  12.5 mg Oral BID WITH MEALS    apixaban (ELIQUIS) tablet 5 mg  5 mg Oral BID    levothyroxine (SYNTHROID) tablet 75 mcg  75 mcg Oral 6am    nitroglycerin (NITROSTAT) tablet 0.4 mg  0.4 mg SubLINGual PRN    atorvastatin (LIPITOR) tablet 40 mg  40 mg Oral QHS    pantoprazole (PROTONIX) tablet 40 mg  40 mg Oral ACB&D    traMADoL (ULTRAM) tablet 50 mg  50 mg Oral Q4H PRN    sodium chloride (NS) flush 5-40 mL  5-40 mL IntraVENous Q8H    sodium chloride (NS) flush 5-40 mL  5-40 mL IntraVENous PRN    acetaminophen (TYLENOL) tablet 650 mg  650 mg Oral Q6H PRN    Or    acetaminophen (TYLENOL) suppository 650 mg  650 mg Rectal Q6H PRN    polyethylene glycol (MIRALAX) packet 17 g  17 g Oral DAILY PRN    ondansetron (ZOFRAN ODT) tablet 4 mg  4 mg Oral Q8H PRN    Or    ondansetron (ZOFRAN) injection 4 mg  4 mg IntraVENous Q4H PRN     ______________________________________________________________________  EXPECTED LENGTH OF STAY: 2d 4h  ACTUAL LENGTH OF STAY:          2                 Carol Veras MD

## 2021-12-23 NOTE — PROGRESS NOTES
Problem: Mobility Impaired (Adult and Pediatric)  Goal: *Acute Goals and Plan of Care (Insert Text)  Description: FUNCTIONAL STATUS PRIOR TO ADMISSION: Patient was independent without use of DME.    HOME SUPPORT PRIOR TO ADMISSION: The patient lived with spouse but did not require assist.    Physical Therapy Goals  Initiated 12/23/2021  1. Patient will move from supine to sit and sit to supine , scoot up and down, and roll side to side in bed with modified independence within 7 day(s). 2.  Patient will transfer from bed to chair and chair to bed with modified independence using the least restrictive device within 7 day(s). 3.  Patient will perform sit to stand with modified independence within 7 day(s). 4.  Patient will ambulate with independence for 150 feet with the least restrictive device within 7 day(s). 5.  Patient will ascend/descend 8 stairs with handrail(s) with modified independence within 7 day(s). Outcome: Not Met   PHYSICAL THERAPY EVALUATION  Patient: Jenna Franco (36 y.o. female)  Date: 12/23/2021  Primary Diagnosis: Chest pain [R07.9]  Palpitations [R00.2]  Hypertensive emergency [I16.1]        Precautions:  Fall    ASSESSMENT  Based on the objective data described below, the patient presents close to her functional baseline. She had some difficulty transitioning from sit to stand d/t chronic joint stiffness and hip pain. Reports this is not new for her. She voiced feeling light headed w/ initial static stance. SBP decreased from 118 sitting to 106 standing. Her symptoms improved standing exs allowing her to walk throughout the room (pt preferred to avoid the nelson today). Noted improvement in gait quality, mobility and joint stiffness with activity. Patient required the use of a walker today. At baseline, she is independent without a device. Therapy will follow. Anticipate home with family and no therapy need.    Educated pt in the benefit of walking and her fall risk with orthostatic BP. Requested she ask staff to assist with walks. PT agreeable. RN aware. Vitals:    12/23/21  1120 12/23/21 1458 12/23/21 1500 12/23/21 1517   BP: 141/72 118/66 106/61 129/73   BP 1 Location:  Left arm Left arm Left arm   BP Patient Position: At rest Sitting Standing Sitting after walking   Pulse:  (!) 58 61 61   Resp:    19   SpO2:    98%     For the weekend, recommend with nursing, once Egress Test completed, OOB to chair 3x/day and walking daily in the nelson with one  assist and walker. Thank you for completing as able in order to maintain patient strength, endurance and independence. Current Level of Function Impacting Discharge (mobility/balance): Up to Min A    Functional Outcome Measure: The patient scored 16/28 on the Tinetti outcome measure which is indicative of high fall risk. Other factors to consider for discharge:      Patient will benefit from skilled therapy intervention to address the above noted impairments. PLAN :  Recommendations and Planned Interventions: transfer training, gait training, therapeutic exercises, patient and family training/education and therapeutic activities      Frequency/Duration: Patient will be followed by physical therapy:  3 times a week to address goals. Recommendation for discharge: (in order for the patient to meet his/her long term goals)  To be determined: Anticipate no therapy follow up needs vs HHPT    This discharge recommendation:  Has not yet been discussed the attending provider and/or case management    IF patient discharges home will need the following DME: patient owns DME required for discharge         SUBJECTIVE:   Patient stated I'm stiff when I get up.     OBJECTIVE DATA SUMMARY:   HISTORY:    Past Medical History:   Diagnosis Date    Arrhythmia     attempted ablation    Arthritis     GERD (gastroesophageal reflux disease)     High cholesterol     History of seasonal allergies     Hypertension     Sleep apnea mild-does not use CPAP    Thyroid disease      Past Surgical History:   Procedure Laterality Date    COLONOSCOPY N/A 10/14/2020    COLONOSCOPY    :- [facility] At[facility] ENDOSCOPY    HX BREAST BIOPSY Right Years ago    Negative    HX CATARACT REMOVAL      bilateral    HX OTHER SURGICAL      cyst removed from left cheek    IL BREAST SURGERY PROCEDURE UNLISTED      breast biopsy-local - benign    IL CARDIAC SURG PROCEDURE UNLIST      attempted ablation       Personal factors and/or comorbidities impacting plan of care:     Home Situation  Home Environment: Private residence  # Steps to Enter: 8  Rails to Enter: Yes  One/Two Story Residence: Two story, live on 1st floor  Living Alone: No  Support Systems: Child(radha),Spouse/Significant Other  Patient Expects to be Discharged to[de-identified] House  Current DME Used/Available at Home: Lary Budd, rolling,Cane, straight    EXAMINATION/PRESENTATION/DECISION MAKING:   Critical Behavior:  Neurologic State: Alert  Orientation Level: Oriented X4  Cognition: Appropriate decision making,Appropriate for age attention/concentration,Appropriate safety awareness,Follows commands  Safety/Judgement: Awareness of environment  Hearing: Auditory  Auditory Impairment: None    Range Of Motion:  AROM: Generally decreased, functional                       Strength:    Strength: Generally decreased, functional                    Tone & Sensation:   Tone: Normal                              Coordination:  Coordination: Within functional limits  Vision:      Functional Mobility:  Bed Mobility:  Received/ remained sitting up in the chair           Transfers:  Sit to Stand: Stand-by assistance (pulls on walker)  Stand to Sit: Minimum assistance;Assist x1 (uncontrolled descent)  Cues to improve transfer technique when using the walker. Improved with practice. Balance:   Sitting: Intact; Without support  Standing: Impaired; Without support  Standing - Static: Fair;Occasional  Standing - Dynamic : Fair;Occasional  Ambulation/Gait Training:  Distance (ft): 50 Feet (ft)  Assistive Device: Walker, rolling;Gait belt  Ambulation - Level of Assistance: Contact guard assistance;Assist x1;Adaptive equipment     Gait Description (WDL): Exceptions to WDL  Gait Abnormalities: Other (flexed trunk')        Base of Support: Widened     Speed/Carolyn: Slow  Step Length: Right shortened;Left shortened                          Therapeutic Activity/ Education:   Fall risk w/ orthostatic BP/ light headed  Benefit of progressive walking program   Walking in the nelson 2x/ day w/ staff assist (primarily to monitor s/s and vitals). Functional Measure:  Tinetti test:    Sitting Balance: 1  Arises: 1  Attempts to Rise: 2  Immediate Standing Balance: 0  Standing Balance: 1  Nudged: 2 (walker support)  Eyes Closed: 0 (inferred)  Turn 360 Degrees - Continuous/Discontinuous: 1  Turn 360 Degrees - Steady/Unsteady: 1 (w/walker)  Sitting Down: 0  Balance Score: 9 Balance total score  Indication of Gait: 0  R Step Length/Height: 1  L Step Length/Height: 1  R Foot Clearance: 1  L Foot Clearance: 1  Step Symmetry: 1  Step Continuity: 1  Path: 1  Trunk: 0  Walking Time: 0  Gait Score: 7 Gait total score  Total Score: 16/28 Overall total score         Tinetti Tool Score Risk of Falls  <19 = High Fall Risk  19-24 = Moderate Fall Risk  25-28 = Low Fall Risk  Tinetti ME. Performance-Oriented Assessment of Mobility Problems in Elderly Patients. [facility] 66; [id number].  (Scoring Description: PT Bulletin Feb. 10, 1993)    Older adults: Osmany Solis et al, 2009; n = 1000 [facility] elderly evaluated with ABC, MITCH, ADL, and IADL)  · Mean MITCH score for males aged 69-68 years = 26.21(3.40)  · Mean MITCH score for females age 69-68 years = 25.16(4.30)  · Mean MITCH score for males over 80 years = 23.29(6.02)  · Mean MITCH score for females over 80 years = 17.20(8.32)            Physical Therapy Evaluation Charge Determination History Examination Presentation Decision-Making   MEDIUM  Complexity : 1-2 comorbidities / personal factors will impact the outcome/ POC  MEDIUM Complexity : 3 Standardized tests and measures addressing body structure, function, activity limitation and / or participation in recreation  MEDIUM Complexity : Evolving with changing characteristics  Other outcome measures Tinetti Balance and Gait   HIGH       Based on the above components, the patient evaluation is determined to be of the following complexity level: MEDIUM    Pain Ratin/10   Has chronic hip and back pain, none today    Activity Tolerance:   Fair and requires rest breaks    After treatment patient left in no apparent distress:   Sitting in chair, Call bell within reach and Caregiver / family present    COMMUNICATION/EDUCATION:   The patients plan of care was discussed with: Registered nurse. Fall prevention education was provided and the patient/caregiver indicated understanding., Patient/family have participated as able in goal setting and plan of care. and Patient/family agree to work toward stated goals and plan of care.     Thank you for this referral.  Jozef Meyers, PT   Time Calculation: 31 mins

## 2021-12-23 NOTE — PROGRESS NOTES
0730 Bedside shift change report given to THAI Reyes (oncoming nurse) by Dina Page (offgoing nurse). Report included the following information SBAR, Kardex, Intake/Output, MAR, Recent Results and Cardiac Rhythm SR.     1930 Bedside and Verbal shift change report given to [address] (oncoming nurse) by Jessie Olson (offgoing nurse). Report included the following information SBAR, Kardex, Intake/Output, MAR, Recent Results, Med Rec Status and Cardiac Rhythm SR. Problem: Falls - Risk of  Goal: *Absence of Falls  Description: Document Rosa Flow Fall Risk and appropriate interventions in the flowsheet.   Outcome: Progressing Towards Goal  Note: Fall Risk Interventions:            Medication Interventions: Teach patient to arise slowly,Evaluate medications/consider consulting pharmacy,Patient to call before getting OOB    Elimination Interventions: Call light in reach              Problem: Patient Education: Go to Patient Education Activity  Goal: Patient/Family Education  Outcome: Progressing Towards Goal     Problem: Hypertension  Goal: *Blood pressure within specified parameters  Outcome: Progressing Towards Goal  Goal: *Fluid volume balance  Outcome: Progressing Towards Goal  Goal: *Labs within defined limits  Outcome: Progressing Towards Goal     Problem: Patient Education: Go to Patient Education Activity  Goal: Patient/Family Education  Outcome: Progressing Towards Goal

## 2021-12-23 NOTE — ADT AUTH CERT NOTES
Comments  Comment            Patient Demographics    Patient Name   Nupur Montiel   [id number] Legal Sex   Female    1940 Address   [facility] [address] Phone   [phone] (Home)   [phone] (Mobile)     Patient Demographics    Patient Name   Nupur Montiel   [id number] Legal Sex   Female    1940 Address   [facility] [address] Phone   [phone] (Home)   [phone] (Mobile)   CSN:   [id number]     [address] [place] [address] Date: Admit Time Room Bed   Dec 19, 2021  1:45  [[id number]] 01 [[id number]]       Attending Providers    Provider Pager From To   Natividad Duran MD  21   Luisa Arguello MD  21   Carolyn Medina MD  21   Kierra Mock MD  21      Emergency Contact(s)    Name Relation Home Work [address] [place] [phone]     [facility] [phone]     Utilization Reviews         Chest Pain - Care Day 3 (2021) by Kelly Byrne RN       Review Entered Review Status   2021 13:15 Completed      Criteria Review      Care Day: 3 Care Date: 2021 Level of Care: Telemetry    Guideline Day 1    Level Of Care    (X) ICU, intermediate care, or telemetry    Clinical Status    ( ) * Clinical Indications met    Activity    ( ) Bed rest    2021 13:15:36 EST by Kelly Byrne      Activity as tolerated w/ assist ordered    Routes    (X) Diet as tolerated    2021 13:15:36 EST by Kelly Byrne      Adult Diet Reg    (X) IV or oral hydration    2021 13:15:36 EST by Kelly Byrne      Oral hydration allowed    (X) IV or oral medications    2021 13:15:36 EST by Kelly Seeing      Tylenol 650mg PO q6h PRN (x2) Synthroid 75mcg PO 6AM Protonix 40mg PO BID Ultram 50mg PO q4h PRN (x1) KLOR- CON 10 20mEq PO qd Hydrodiuril 25mg PO qd    Interventions    (X) Arrange stress test, invasive procedures as needed    Medications    (X) Possible antiplatelet agents, anticoagulants    12/22/2021 13:15:36 EST by Vince Falk      Eliquis 5mg PO BID    (X) Possible beta-blocker    12/22/2021 13:15:36 EST by Vince Falk      Coreg 12.5mg PO BID    (X) Possible statin    12/22/2021 13:15:36 EST by Vince Falk      Lipitor 40mg PO qd    (X) Antihypertensive medication as needed to control blood pressure    12/22/2021 13:15:36 EST by Vince Falk      Cozaar 50mg PO qd    * Milestone   Additional Notes   Day 3: 12/21      Interval history / Subjective:   Pt was going to DC home yesterday however became hypotensive with standing getting up from chair, required 500cc bolus. BP now back up to 726'F systolic           Visit Vitals   BP (!) 216/95   Pulse 64   Temp 97.7 °F (36.5 °C)   Resp 16   SpO2 95%      Constitutional: No acute distress, cooperative, pleasant    ENT: Oral mucosa moist, oropharynx benign. Resp: CTA bilaterally. No wheezing/rhonchi/rales. No accessory muscle use   CV: Regular rhythm, normal rate, no murmurs, gallops, rubs    GI: Soft, non distended, non tender. normoactive bowel sounds, no hepatosplenomegaly     Musculoskeletal: No edema, warm, 2+ pulses throughout     Neurologic: Moves all extremities.   AAOx3, CN II-XII reviewed       No add'l labs      Assessment & Plan:       Chest pain   - Stress test negative   - Troponins negative   - EKG non focal    - Cardiology following        HTN emergency - with chest pain    - DC metoprolol, lisinopril   - Decrease coreg, losartan and HCTZ to half doses   - Cardiology following       H/o Afib - eliquis, coreg        GERD - start PPI    Hypothyroidism - levothyroxine   Mild hyponatremia        Code status: FULL   DVT prophylaxis: Eliquis        Care Plan discussed with: Patient/Family   Anticipated Disposition: Home w/Family   Anticipated Discharge: 24 hours to 48 hours        Cardiology Consult-      Cardiology Progress Note                                        Admit Date: 12/19/2021       Assessment/Plan:       1.  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 Hx PAF :  Resume eliquis was med PTA    4. Uncontrolled htn :  Renal study to eval for LETICIA    ·  Consistent with borderline renal parenchymal disease in the left kidney. · No evidence of hemodynamically significant stenosis of the renal arteries or superior mesenteric artery.       5. Non obstructive cad 4.2019    6.  Elevated troponin:    lexiscan MPI      Normal gated rest/stress myocardial perfusion imaging study. No evidence of myocardial ischemia or infarction. Left ventricular ejection fraction is 65 %       7. Back pain    8.  Hypotension with bradycardial:  Symptomatic   cw vasovagal response     rec :  Restarting bp meds at half dose.            Chest Pain - Care Day 2 (12/20/2021) by Rolando Willis RN       Review Entered Review Status   12/22/2021 12:59 Completed      Criteria Review      Care Day: 2 Care Date: 12/20/2021 Level of Care: Telemetry    Guideline Day 1    Level Of Care    (X) ICU, intermediate care, or telemetry    12/22/2021 12:59:02 EST by Rolando Willis      [facility]    Clinical Status    ( ) * Clinical Indications met    Activity    ( ) Bed rest    12/22/2021 12:59:02 EST by Margareth Anthony Ambulate w/ assist ordered    Routes    (X) Diet as tolerated    12/22/2021 12:59:02 EST by Rolando Willis      Adult Diet Reg    (X) IV or oral hydration    12/22/2021 12:59:02 EST by Rolando Willis      Oral hydration allowed    (X) IV or oral medications    12/22/2021 12:59:02 EST by Rolando Willis      Synthroid 75mcg PO 6AM Protonix 40mg PO BID Miralax 17g Ultram 50mg PO q4h PRN (x1) KLOR-CON 10 20mEq PO qd    Interventions    (X) Arrange stress test, invasive procedures as needed    12/22/2021 12:59:02 EST by Kimberley Kayser Nuclear Stress ordered    Medications    (X) Possible antiplatelet agents, anticoagulants    (X) Possible beta-blocker    12/22/2021 12:59:02 EST by Kelly Haus      Coreg 25mg PO BID    (X) Possible statin    12/22/2021 12:59:02 EST by Kelly Haus      Lipitor 40mg PO HS    (X) Antihypertensive medication as needed to control blood pressure    12/22/2021 12:59:02 EST by Kelly Haus      Cozaar 100mg PO qd    * Milestone   Additional Notes   Day 2: 12/20      Cardio Consult-      Subjective:       Date of  Admission: 12/19/2021  1:45 AM             Admission type:Emergency       Eitan Williamson is a 80 y.o. female admitted for Chest pain [R07.9]   Palpitations [R00.2]. presents a second episode of chest pain.  Her first episode of chest pain was on 12/16/2021-she [facility] found to have mild lower extremity swelling and uncontrolled hypertension with systolic blood pressures in the 200s-at that time her heart enzymes were unremarkable her EKG negative for MI and patient was sent home on Lasix.  She took Lasix Friday and Saturday-and woke up again at 1 AM this morning with severe chest pain-8 out of 10 midsternal in location nonradiating   Since admission been having recurrent NSVT       Physical Exam       Visit Vitals   BP (!) 155/93 (BP 1 Location: Right upper arm)   Pulse (!) 56   Temp 98.6 °F (37 °C)   Resp 19   Ht 5' 8\" (1.727 m)   Wt 202 lb 13.2 oz (92 kg)   SpO2 91%   BMI 30.84 kg/m²      No new labs       Skin warm and dry   PERRLA, EOMI   Oropharynx without exudate.  Mallampati 2   Neck supple, thyroid not enlarged   Lungs clear   PMI non displaced.  Normal S1/ S2    No Mummurs, click or Rubs   No S3 or S4   Abdomen soft and non tender, No Hepatosplenomegaly   Pulses 2+ throughout,     Neuro:    normal facial grimace,  Moves all extremities.   AAAO   unanxious      Cardiographics     Telemetry:WCT  20 beat    warmup    ECG:  nsr    nsvt        Assessment:        Active Problems:     Palpitations (12/19/2021)         Chest pain (12/19/2021)               Plan:       1.  WCT :   New finding   holter 8./2021  <1% pvcs   appears to be focal mechanism and most consistent with aberrancy not VT    rec :  Increased dose of coreg ( blocker )     echo    Continue monitoring    2. 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 Hx PAF    4. Uncontrolled htn :  Renal study to eval for LETICIA    5. Non obstructive cad 4.2019    6.  Elevated troponin:    lexiscan MPI    7. Back pain            PA Recommendation by Efraín Butler RN       Review Entered Review Status   12/22/2021 12:50 In Primary      Criteria Review   We recommend that the following pt's hospitalization under INPATIENT [101] status is APPROPRIATE       Clinical summary     1.  WCT :   most recent event irreg irregular most consistent with Paroxysmal afib with aberancy .   2. 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 Hx PAF   4. Uncontrolled htn     Vitals   Labs and Imaging   MCG criteria applies YES  Comments     The Documentation in the medical record does supports Inpatient Level of care currently. This decision is based on Admitting diagnosis, clinical and diagnostic findings, Severity of signs and symptoms, Hospital treatment and progress. Furthermore, this patient will require more monitoring, diagnostic tests as well as multidisciplinary treatment. This patient is at potential risk to develop adverse events and complications from the Initial clinical diagnosis or from chronic medical conditions.       This chart was reviewed at 12:44 PM 12/22/2021    Catherine Nguyen MD MD   Physician Vika Simpson [address] [place] [address] [phone]

## 2021-12-23 NOTE — PROGRESS NOTES
Spoke with RN staff and Dr. Kortney Cruz. Pt was markedly hypertensive into the 112F systolic. She received IV hydralazine which improved her BP. She then went back into her rapid aberrant AF rhythm. BP was now significantly hypotensive and so I discussed that she may need to have a cardioversion performed and that we may need to discuss sedation and cardioversion with intensivist, Dr. Waldo Daniel. Spoke with Dr. Kortney Cruz who performed a bedside echocardiogram. She was noted to have a LBBB on her EKG. Bedside echo showed good biventricular function. Stress test on 12/20 showed no evidence of ischemia    Reviewed notes from previous admission which requires opening of old visit in the Chart Review tab. In 2019, pt had a a similar episode. She had chest pain (as she does now) and a LBBB on her EKG (as she does now). She was taken emergently to the cath lab and was noted to have very mild CAD. She was noted to have a Takastubo-like morphology on her echo with an LVEF of 30%. She had been placed on appropriate therapy at that time and her LVEF had improved to 50-55% on echo at our office. Given the normal LVEF on echo and a similar presentation in 2019, will hold off on taking her emergently to the cath lab. Dr. Kortney Cruz has placed orders for amiodarone to help achieve rate control/converstion to NSR and also morphine for chest pain with the thought that her pain is more c/w discomfort due to her rapid HR. She is currently anticoagulated with NOAC and received the most recent dose at 2100 earlier in this shift. Will forward information to Dr. Diann Mejía and will follow labs and clinical progress. Will make pt NPO in event that coronary angiography is considered later today.

## 2021-12-24 LAB
ATRIAL RATE: 135 BPM
ATRIAL RATE: 138 BPM
ATRIAL RATE: 144 BPM
CALCULATED R AXIS, ECG10: -133 DEGREES
CALCULATED R AXIS, ECG10: -47 DEGREES
CALCULATED R AXIS, ECG10: -51 DEGREES
CALCULATED T AXIS, ECG11: 116 DEGREES
CALCULATED T AXIS, ECG11: 133 DEGREES
CALCULATED T AXIS, ECG11: 51 DEGREES
DIAGNOSIS, 93000: NORMAL
Q-T INTERVAL, ECG07: 310 MS
Q-T INTERVAL, ECG07: 372 MS
Q-T INTERVAL, ECG07: 378 MS
QRS DURATION, ECG06: 138 MS
QRS DURATION, ECG06: 146 MS
QRS DURATION, ECG06: 152 MS
QTC CALCULATION (BEZET), ECG08: 465 MS
QTC CALCULATION (BEZET), ECG08: 538 MS
QTC CALCULATION (BEZET), ECG08: 556 MS
VENTRICULAR RATE, ECG03: 126 BPM
VENTRICULAR RATE, ECG03: 130 BPM
VENTRICULAR RATE, ECG03: 135 BPM

## 2021-12-24 PROCEDURE — 74011250637 HC RX REV CODE- 250/637: Performed by: STUDENT IN AN ORGANIZED HEALTH CARE EDUCATION/TRAINING PROGRAM

## 2021-12-24 PROCEDURE — 74011250637 HC RX REV CODE- 250/637: Performed by: INTERNAL MEDICINE

## 2021-12-24 PROCEDURE — 74011250637 HC RX REV CODE- 250/637: Performed by: FAMILY MEDICINE

## 2021-12-24 PROCEDURE — 74011250636 HC RX REV CODE- 250/636: Performed by: INTERNAL MEDICINE

## 2021-12-24 PROCEDURE — 65660000000 HC RM CCU STEPDOWN

## 2021-12-24 RX ORDER — METHOCARBAMOL 100 MG/ML
500 INJECTION, SOLUTION INTRAMUSCULAR; INTRAVENOUS
Status: DISCONTINUED | OUTPATIENT
Start: 2021-12-24 | End: 2021-12-29 | Stop reason: HOSPADM

## 2021-12-24 RX ORDER — CYCLOBENZAPRINE HCL 10 MG
10 TABLET ORAL
Status: DISCONTINUED | OUTPATIENT
Start: 2021-12-24 | End: 2021-12-29 | Stop reason: HOSPADM

## 2021-12-24 RX ORDER — CARVEDILOL 12.5 MG/1
25 TABLET ORAL 2 TIMES DAILY WITH MEALS
Status: DISCONTINUED | OUTPATIENT
Start: 2021-12-24 | End: 2021-12-27

## 2021-12-24 RX ORDER — AMIODARONE HYDROCHLORIDE 200 MG/1
200 TABLET ORAL DAILY
Status: DISCONTINUED | OUTPATIENT
Start: 2021-12-24 | End: 2021-12-29 | Stop reason: HOSPADM

## 2021-12-24 RX ORDER — CYCLOBENZAPRINE HCL 10 MG
5 TABLET ORAL
Status: DISCONTINUED | OUTPATIENT
Start: 2021-12-24 | End: 2021-12-29 | Stop reason: HOSPADM

## 2021-12-24 RX ORDER — CARVEDILOL 12.5 MG/1
25 TABLET ORAL 2 TIMES DAILY WITH MEALS
Status: DISCONTINUED | OUTPATIENT
Start: 2021-12-24 | End: 2021-12-24

## 2021-12-24 RX ADMIN — APIXABAN 5 MG: 5 TABLET, FILM COATED ORAL at 22:02

## 2021-12-24 RX ADMIN — CARVEDILOL 25 MG: 12.5 TABLET, FILM COATED ORAL at 10:51

## 2021-12-24 RX ADMIN — ONDANSETRON 4 MG: 4 TABLET, ORALLY DISINTEGRATING ORAL at 17:58

## 2021-12-24 RX ADMIN — ATORVASTATIN CALCIUM 40 MG: 40 TABLET, FILM COATED ORAL at 22:02

## 2021-12-24 RX ADMIN — Medication 10 ML: at 22:02

## 2021-12-24 RX ADMIN — CARVEDILOL 25 MG: 12.5 TABLET, FILM COATED ORAL at 17:58

## 2021-12-24 RX ADMIN — ACETAMINOPHEN 650 MG: 325 TABLET ORAL at 09:33

## 2021-12-24 RX ADMIN — CYCLOBENZAPRINE 10 MG: 10 TABLET, FILM COATED ORAL at 22:23

## 2021-12-24 RX ADMIN — TRAMADOL HYDROCHLORIDE 50 MG: 50 TABLET, COATED ORAL at 15:20

## 2021-12-24 RX ADMIN — SPIRONOLACTONE 50 MG: 25 TABLET ORAL at 09:28

## 2021-12-24 RX ADMIN — AMIODARONE HYDROCHLORIDE 200 MG: 200 TABLET ORAL at 09:27

## 2021-12-24 RX ADMIN — Medication 10 ML: at 06:04

## 2021-12-24 RX ADMIN — METHOCARBAMOL 500 MG: 100 INJECTION INTRAMUSCULAR; INTRAVENOUS at 00:52

## 2021-12-24 RX ADMIN — TRAMADOL HYDROCHLORIDE 50 MG: 50 TABLET, COATED ORAL at 20:54

## 2021-12-24 RX ADMIN — APIXABAN 5 MG: 5 TABLET, FILM COATED ORAL at 09:27

## 2021-12-24 RX ADMIN — PANTOPRAZOLE SODIUM 40 MG: 40 TABLET, DELAYED RELEASE ORAL at 15:20

## 2021-12-24 RX ADMIN — PANTOPRAZOLE SODIUM 40 MG: 40 TABLET, DELAYED RELEASE ORAL at 07:23

## 2021-12-24 RX ADMIN — LEVOTHYROXINE SODIUM 75 MCG: 0.07 TABLET ORAL at 06:04

## 2021-12-24 RX ADMIN — LOSARTAN POTASSIUM 100 MG: 50 TABLET, FILM COATED ORAL at 17:58

## 2021-12-24 NOTE — PROGRESS NOTES
Charting and patient care of Daisyvin Moulton by Dwight Portillo from 12/23/21 1930 to 12/24/21 0900 was supervised and reviewed by this RN.

## 2021-12-24 NOTE — PROGRESS NOTES
Cardiology Progress Note                                        Admit Date: 12/19/2021    Assessment/Plan:     Hypertensive urgency; still very hypertensive at night with pressure upto 250/128; will increase Coreg  PAF; in sinus shi now; will decrease Amiodarone to 200 mg a day as we are increasing Coreg this am; will monitor  Debility; due to hip pain; she does not want to go home where her  has dementia and she can not take care of him    Marlin Thomas is a 80 y.o. female with     PROBLEM LIST:  Patient Active Problem List    Diagnosis Date Noted    Hypertensive emergency 12/20/2021    Palpitations 12/19/2021    Chest pain 12/19/2021    LBBB (left bundle branch block) 04/08/2019    Acute chest pain 04/08/2019    Uncontrolled hypertension 04/08/2019         Subjective:     Marlin Thomas reports none. Visit Vitals  BP (!) 166/88 (BP 1 Location: Left upper arm, BP Patient Position: At rest;Lying left side)   Pulse (!) 56   Temp 98.6 °F (37 °C)   Resp 18   Ht 5' 8\" (1.727 m)   Wt 208 lb 8.9 oz (94.6 kg)   SpO2 99%   BMI 31.71 kg/m²       Intake/Output Summary (Last 24 hours) at 12/24/2021 0816  Last data filed at 12/24/2021 0345  Gross per 24 hour   Intake 720 ml   Output    Net 720 ml       Objective:      Physical Exam:  HEENT: Perrla, EOMI  Neck: No JVD,  No thyroidmegaly  Resp: CTA bilaterally;  No wheezes or rales  CV: RRR s1s2 No murmur no s3  Abd:Soft, Nontender  Ext: No edema  Neuro: Alert and oriented; Nonfocal  Skin: Warm, Dry, Intact  Pulses: 2+ DP/PT/Rad      Telemetry: normal sinus rhythm    Current Facility-Administered Medications   Medication Dose Route Frequency    methocarbamoL (ROBAXIN) injection 500 mg  500 mg IntraVENous Q8H PRN    amiodarone (CORDARONE) tablet 200 mg  200 mg Oral DAILY    morphine injection 1 mg  1 mg IntraVENous Q3H PRN    spironolactone (ALDACTONE) tablet 50 mg  50 mg Oral DAILY    losartan (COZAAR) tablet 100 mg  100 mg Oral QPM    carvediloL (COREG) tablet 12.5 mg  12.5 mg Oral BID WITH MEALS    apixaban (ELIQUIS) tablet 5 mg  5 mg Oral BID    levothyroxine (SYNTHROID) tablet 75 mcg  75 mcg Oral 6am    nitroglycerin (NITROSTAT) tablet 0.4 mg  0.4 mg SubLINGual PRN    atorvastatin (LIPITOR) tablet 40 mg  40 mg Oral QHS    pantoprazole (PROTONIX) tablet 40 mg  40 mg Oral ACB&D    traMADoL (ULTRAM) tablet 50 mg  50 mg Oral Q4H PRN    sodium chloride (NS) flush 5-40 mL  5-40 mL IntraVENous Q8H    sodium chloride (NS) flush 5-40 mL  5-40 mL IntraVENous PRN    acetaminophen (TYLENOL) tablet 650 mg  650 mg Oral Q6H PRN    Or    acetaminophen (TYLENOL) suppository 650 mg  650 mg Rectal Q6H PRN    polyethylene glycol (MIRALAX) packet 17 g  17 g Oral DAILY PRN    ondansetron (ZOFRAN ODT) tablet 4 mg  4 mg Oral Q8H PRN    Or    ondansetron (ZOFRAN) injection 4 mg  4 mg IntraVENous Q4H PRN         Data Review:   Labs:    Recent Results (from the past 24 hour(s))   ECHO ADULT COMPLETE    Collection Time: 12/23/21  5:04 PM   Result Value Ref Range    LVOT Peak Gradient 5 mmHg    LVOT Peak Velocity 1.1 m/s    RV Free Wall Peak S' 16 cm/s    LA Volume A/L 125 mL    LA Volume 2C 101 (A) 22 - 52 mL    LA Volume 4C 113 (A) 22 - 52 mL    AV Peak Gradient 10 mmHg    AV Peak Velocity 1.6 m/s    MV A Velocity 0.95 m/s    MV E Wave Deceleration Time 412.6 ms    MV E Velocity 0.70 m/s    MV .6 ms    MV Area by PHT 1.8 cm2    PV Peak Gradient 2 mmHg    PV Max Velocity 0.7 m/s    TAPSE 2.3 (A) 1.5 - 2.0 cm    TR Peak Gradient 12 mmHg    TR Max Velocity 1.73 m/s    MV E/A 0.74     LA Volume Index A/L 60 16 - 34 mL/m2    LA Volume Index 2C 49 (A) 16 - 34 mL/m2    LA Volume Index 4C 55 (A) 16 - 34 mL/m2    AV Velocity Ratio 0.69

## 2021-12-24 NOTE — PROGRESS NOTES
Transitions of Care Plan   RUR: 8% - low   Clinical Update: BP remains hypertensive/elevated    Consults: Cardiology   Baseline: independent without DME; resides w  (dementia)   Barrier(s) to Discharge: medical - BP med management   Disposition: home    Estimated Discharge Date: 12/25/21      Clinical update per chart review and/or patient discussed during Interdisciplinary Rounds:    Patient is not medically stable for discharge due to ongoing medical needs:    · Patient's BP remains elevated     CM continues to follow treatment plan for medical progress and disposition needs.     Disposition:  Home with family support    Napoleon Potter, [address]  Available via [address] [place] [address] or

## 2021-12-24 NOTE — PROGRESS NOTES
[facility]  Hospitalist Group                                                                                          Hospitalist Progress Note  Mikki Sylvester MD  Answering service: [phone] from in house phone        Date of Service:  2021  NAME:  Thu Greenwood  :  1940  MRN:  [id number]      Admission Summary:   69y/o female with pmh of HLD, HTN who presented with recurrent chest pain in the context of HTN emergency. Pateint [facility] with BPs that were elevated and associated with chest pain. Her first visit, she was started on additional BP medications, and discharged after having neg enzymes, and neg EKG. She represented with additionally elevated BPs; and required admission. Interval history / Subjective:     Patient examined today, complaining of the hip pain, not feeling going home. Assessment & Plan:     Chest pain  - Stress test negative  - Troponins negative  - EKG non focal   - Cardiology following   Last night patient had chest pain along with A. fib episode which resolved with movement.     HTN emergency - with chest pain   Orthostatic hypotension   - DC home metoproloL  -Coreg increased to 25 mg twice daily, amiodarone reduced to 200 mg daily had the patient heart rate drops. -Discussed with cardiology in detailwe will keep the head of the bed elevated 30 degrees to decrease supine hypertension.  - Cardiology following    A. fib with RVR  Decrease amiodarone to 200 mg daily.   Already on Eliquis and Coreg.     GERD - start PPI   Hypothyroidism - levothyroxine  Mild hyponatremia     Code status: FULL  DVT prophylaxis: Eliquis     Care Plan discussed with: Patient/Family  Anticipated Disposition: Home w/Family  Anticipated Discharge: 24 to 48 hours if the heart rate and the blood pressure remained stable and PT and OT evaluation pending     Hospital Problems  Date Reviewed: 2021          Codes Class Noted POA Hypertensive emergency ICD-10-CM: I16.1  ICD-9-CM: 401.9  12/20/2021 Unknown        Palpitations ICD-10-CM: R00.2  ICD-9-CM: 785.1  12/19/2021 Unknown        Chest pain ICD-10-CM: R07.9  ICD-9-CM: 786.50  12/19/2021 Unknown                Review of Systems:   A comprehensive review of systems was negative except for that written in the HPI. Vital Signs:    Last 24hrs VS reviewed since prior progress note. Most recent are:  Visit Vitals  BP (!) 178/90 (BP 1 Location: Left upper arm, BP Patient Position: At rest)   Pulse 60   Temp 97.6 °F (36.4 °C)   Resp 20   Ht 5' 8\" (1.727 m)   Wt 94.6 kg (208 lb 8.9 oz)   SpO2 97%   BMI 31.71 kg/m²         Intake/Output Summary (Last 24 hours) at 12/24/2021 1134  Last data filed at 12/24/2021 0345  Gross per 24 hour   Intake 720 ml   Output    Net 720 ml        Physical Examination:     I had a face to face encounter with this patient and independently examined them on 12/24/2021 as outlined below:          Constitutional:  No acute distress, cooperative, pleasant    ENT:  Oral mucosa moist, oropharynx benign. Resp:  CTA bilaterally. No wheezing/rhonchi/rales. No accessory muscle use   CV:  Regular rhythm, normal rate, no murmurs, gallops, rubs    GI:  Soft, non distended, non tender. normoactive bowel sounds, no hepatosplenomegaly     Musculoskeletal:  No edema, warm, 2+ pulses throughout     Neurologic:  Moves all extremities.   AAOx3, CN II-XII reviewed             Data Review:    Review and/or order of clinical lab test  Review and/or order of tests in the radiology section of CPT  Review and/or order of tests in the medicine section of CPT      Labs:     Recent Labs     12/23/21 0319 12/22/21 0035   WBC 9.4 9.1   HGB 12.8 12.7   HCT 37.7 37.8    288     Recent Labs     12/23/21 0319 12/22/21 0035   * 128*   K 3.6 3.9   CL 92* 95*   CO2 24 24   BUN 15 16   CREA 0.95 0.84   * 108*   CA 9.2 8.9   MG 1.6 1.8   PHOS 3.0 3.4     No results for input(s): ALT, AP, TBIL, TBILI, TP, ALB, GLOB, GGT, AML, LPSE in the last 72 hours. No lab exists for component: SGOT, GPT, AMYP, HLPSE  No results for input(s): INR, PTP, APTT, INREXT, INREXT in the last 72 hours. No results for input(s): FE, TIBC, PSAT, FERR in the last 72 hours. No results found for: FOL, RBCF   No results for input(s): PH, PCO2, PO2 in the last 72 hours. No results for input(s): CPK, CKNDX, TROIQ in the last 72 hours.     No lab exists for component: CPKMB  No results found for: CHOL, CHOLX, CHLST, CHOLV, HDL, HDLP, LDL, LDLC, DLDLP, TGLX, TRIGL, TRIGP, CHHD, CHHDX  No results found for: GLUCPOC  No results found for: COLOR, APPRN, SPGRU, REFSG, EDWARD, PROTU, GLUCU, KETU, BILU, UROU, EARL, LEUKU, GLUKE, EPSU, BACTU, WBCU, RBCU, CASTS, UCRY      Medications Reviewed:     Current Facility-Administered Medications   Medication Dose Route Frequency    methocarbamoL (ROBAXIN) injection 500 mg  500 mg IntraVENous Q8H PRN    amiodarone (CORDARONE) tablet 200 mg  200 mg Oral DAILY    carvediloL (COREG) tablet 25 mg  25 mg Oral BID WITH MEALS    morphine injection 1 mg  1 mg IntraVENous Q3H PRN    spironolactone (ALDACTONE) tablet 50 mg  50 mg Oral DAILY    losartan (COZAAR) tablet 100 mg  100 mg Oral QPM    apixaban (ELIQUIS) tablet 5 mg  5 mg Oral BID    levothyroxine (SYNTHROID) tablet 75 mcg  75 mcg Oral 6am    nitroglycerin (NITROSTAT) tablet 0.4 mg  0.4 mg SubLINGual PRN    atorvastatin (LIPITOR) tablet 40 mg  40 mg Oral QHS    pantoprazole (PROTONIX) tablet 40 mg  40 mg Oral ACB&D    traMADoL (ULTRAM) tablet 50 mg  50 mg Oral Q4H PRN    sodium chloride (NS) flush 5-40 mL  5-40 mL IntraVENous Q8H    sodium chloride (NS) flush 5-40 mL  5-40 mL IntraVENous PRN    acetaminophen (TYLENOL) tablet 650 mg  650 mg Oral Q6H PRN    Or    acetaminophen (TYLENOL) suppository 650 mg  650 mg Rectal Q6H PRN    polyethylene glycol (MIRALAX) packet 17 g  17 g Oral DAILY PRN    ondansetron (ZOFRAN ODT) tablet 4 mg  4 mg Oral Q8H PRN    Or    ondansetron (ZOFRAN) injection 4 mg  4 mg IntraVENous Q4H PRN     ______________________________________________________________________  EXPECTED LENGTH OF STAY: 2d 4h  ACTUAL LENGTH OF STAY:          3                 Stuart Francois MD

## 2021-12-24 NOTE — PROGRESS NOTES
1930:Bedside and Verbal shift change report given to Israel Reddy and Marisela Baca RN (oncoming nurse) by Samantha Cross RN (offgoing nurse). Report included the following information SBAR, Kardex, Procedure Summary, Recent Results, Med Rec Status and Cardiac Rhythm Sinus Rhythm. 0000: pt BP trending higher 241/96 asymptomatic, paged VCS spoke to DR. Rosanne Fortune no new orders given will continue to monitor. 0015: pt complaining of repeated muscle spasms paged dr. Suri Mackey 500 mg q8 PRN ordered    0730: Bedside and Verbal shift change report given to [address] [place] [address] (oncoming nurse) by Adri Ramos RN (offgoing nurse). Report included the following information SBAR, Kardex, ED Summary, Procedure Summary, Recent Results, Med Rec Status and Cardiac Rhythm simus rhythm.        Problem: Patient Education: Go to Patient Education Activity  Goal: Patient/Family Education  Outcome: Progressing Towards Goal     Problem: Hypertension  Goal: *Fluid volume balance  Outcome: Progressing Towards Goal     Problem: Patient Education: Go to Patient Education Activity  Goal: Patient/Family Education  Outcome: Progressing Towards Goal     Problem: Patient Education: Go to Patient Education Activity  Goal: Patient/Family Education  Outcome: Progressing Towards Goal

## 2021-12-25 ENCOUNTER — APPOINTMENT (OUTPATIENT)
Dept: CT IMAGING | Age: 81
DRG: 305 | End: 2021-12-25
Attending: FAMILY MEDICINE
Payer: MEDICARE

## 2021-12-25 ENCOUNTER — APPOINTMENT (OUTPATIENT)
Dept: CT IMAGING | Age: 81
DRG: 305 | End: 2021-12-25
Attending: NURSE PRACTITIONER
Payer: MEDICARE

## 2021-12-25 LAB
ANION GAP SERPL CALC-SCNC: 7 MMOL/L (ref 5–15)
APPEARANCE UR: CLEAR
ARTERIAL PATENCY WRIST A: NEGATIVE
BACTERIA URNS QL MICRO: NEGATIVE /HPF
BASE DEFICIT BLDV-SCNC: 0.9 MMOL/L
BDY SITE: ABNORMAL
BILIRUB UR QL: NEGATIVE
BUN SERPL-MCNC: 17 MG/DL (ref 6–20)
BUN/CREAT SERPL: 14 (ref 12–20)
CALCIUM SERPL-MCNC: 8.9 MG/DL (ref 8.5–10.1)
CHLORIDE SERPL-SCNC: 89 MMOL/L (ref 97–108)
CO2 SERPL-SCNC: 24 MMOL/L (ref 21–32)
COLOR UR: NORMAL
COMMENT, HOLDF: NORMAL
CORTIS AM PEAK SERPL-MCNC: 10.5 UG/DL (ref 4.3–22.45)
CREAT SERPL-MCNC: 1.18 MG/DL (ref 0.55–1.02)
ECHO AV PEAK GRADIENT: 10 MMHG
ECHO AV PEAK VELOCITY: 1.6 M/S
ECHO AV VELOCITY RATIO: 0.69
ECHO LA VOL 2C: 101 ML (ref 22–52)
ECHO LA VOL 4C: 113 ML (ref 22–52)
ECHO LA VOLUME AREA LENGTH: 125 ML
ECHO LA VOLUME INDEX A2C: 49 ML/M2 (ref 16–34)
ECHO LA VOLUME INDEX A4C: 54 ML/M2 (ref 16–34)
ECHO LA VOLUME INDEX AREA LENGTH: 60 ML/M2 (ref 16–34)
ECHO LVOT PEAK GRADIENT: 5 MMHG
ECHO LVOT PEAK VELOCITY: 1.1 M/S
ECHO MV A VELOCITY: 0.95 M/S
ECHO MV AREA PHT: 1.8 CM2
ECHO MV E DECELERATION TIME (DT): 412.6 MS
ECHO MV E VELOCITY: 0.7 M/S
ECHO MV E/A RATIO: 0.74
ECHO MV PRESSURE HALF TIME (PHT): 119.6 MS
ECHO PV MAX VELOCITY: 0.7 M/S
ECHO PV PEAK GRADIENT: 2 MMHG
ECHO RV FREE WALL PEAK S': 16 CM/S
ECHO RV TAPSE: 2.3 CM (ref 1.5–2)
ECHO TV REGURGITANT MAX VELOCITY: 1.73 M/S
ECHO TV REGURGITANT PEAK GRADIENT: 12 MMHG
EPITH CASTS URNS QL MICRO: NORMAL /LPF
ERYTHROCYTE [DISTWIDTH] IN BLOOD BY AUTOMATED COUNT: 12.4 % (ref 11.5–14.5)
GAS FLOW.O2 O2 DELIVERY SYS: ABNORMAL L/MIN
GLUCOSE BLD STRIP.AUTO-MCNC: 104 MG/DL (ref 65–117)
GLUCOSE SERPL-MCNC: 107 MG/DL (ref 65–100)
GLUCOSE UR STRIP.AUTO-MCNC: NEGATIVE MG/DL
HCO3 BLDV-SCNC: 24.3 MMOL/L (ref 23–28)
HCT VFR BLD AUTO: 36 % (ref 35–47)
HGB BLD-MCNC: 12.1 G/DL (ref 11.5–16)
HGB UR QL STRIP: NEGATIVE
KETONES UR QL STRIP.AUTO: NEGATIVE MG/DL
LACTATE SERPL-SCNC: 1.7 MMOL/L (ref 0.4–2)
LEUKOCYTE ESTERASE UR QL STRIP.AUTO: NEGATIVE
MAGNESIUM SERPL-MCNC: 1.9 MG/DL (ref 1.6–2.4)
MCH RBC QN AUTO: 30.6 PG (ref 26–34)
MCHC RBC AUTO-ENTMCNC: 33.6 G/DL (ref 30–36.5)
MCV RBC AUTO: 91.1 FL (ref 80–99)
NITRITE UR QL STRIP.AUTO: NEGATIVE
NRBC # BLD: 0 K/UL (ref 0–0.01)
NRBC BLD-RTO: 0 PER 100 WBC
OSMOLALITY UR: 146 MOSM/KG H2O
PCO2 BLDV: 41.2 MMHG (ref 41–51)
PH BLDV: 7.38 [PH] (ref 7.32–7.42)
PH UR STRIP: 7 [PH] (ref 5–8)
PLATELET # BLD AUTO: 314 K/UL (ref 150–400)
PMV BLD AUTO: 8.3 FL (ref 8.9–12.9)
PO2 BLDV: 28 MMHG (ref 25–40)
POTASSIUM SERPL-SCNC: 3.9 MMOL/L (ref 3.5–5.1)
POTASSIUM UR-SCNC: 5 MMOL/L
PROT UR STRIP-MCNC: NEGATIVE MG/DL
RBC # BLD AUTO: 3.95 M/UL (ref 3.8–5.2)
RBC #/AREA URNS HPF: NORMAL /HPF (ref 0–5)
SAMPLES BEING HELD,HOLD: NORMAL
SAO2 % BLDV: 50.2 % (ref 65–88)
SERVICE CMNT-IMP: ABNORMAL
SERVICE CMNT-IMP: NORMAL
SODIUM SERPL-SCNC: 120 MMOL/L (ref 136–145)
SODIUM SERPL-SCNC: 125 MMOL/L (ref 136–145)
SODIUM SERPL-SCNC: NORMAL MMOL/L (ref 136–145)
SODIUM UR-SCNC: 24 MMOL/L
SP GR UR REFRACTOMETRY: 1.02 (ref 1–1.03)
SPECIMEN TYPE: ABNORMAL
TROPONIN-HIGH SENSITIVITY: 119 NG/L (ref 0–51)
TROPONIN-HIGH SENSITIVITY: NORMAL NG/L (ref 0–51)
TSH SERPL DL<=0.05 MIU/L-ACNC: 0.93 UIU/ML (ref 0.36–3.74)
TSH SERPL DL<=0.05 MIU/L-ACNC: 0.94 UIU/ML (ref 0.36–3.74)
UROBILINOGEN UR QL STRIP.AUTO: 0.2 EU/DL (ref 0.2–1)
VIT B12 SERPL-MCNC: 671 PG/ML (ref 193–986)
WBC # BLD AUTO: 8.9 K/UL (ref 3.6–11)
WBC URNS QL MICRO: NORMAL /HPF (ref 0–4)

## 2021-12-25 PROCEDURE — 65660000000 HC RM CCU STEPDOWN

## 2021-12-25 PROCEDURE — 36415 COLL VENOUS BLD VENIPUNCTURE: CPT

## 2021-12-25 PROCEDURE — 84295 ASSAY OF SERUM SODIUM: CPT

## 2021-12-25 PROCEDURE — 82607 VITAMIN B-12: CPT

## 2021-12-25 PROCEDURE — 83605 ASSAY OF LACTIC ACID: CPT

## 2021-12-25 PROCEDURE — 74011250637 HC RX REV CODE- 250/637: Performed by: INTERNAL MEDICINE

## 2021-12-25 PROCEDURE — 84133 ASSAY OF URINE POTASSIUM: CPT

## 2021-12-25 PROCEDURE — 84443 ASSAY THYROID STIM HORMONE: CPT

## 2021-12-25 PROCEDURE — 83735 ASSAY OF MAGNESIUM: CPT

## 2021-12-25 PROCEDURE — 74011000636 HC RX REV CODE- 636: Performed by: FAMILY MEDICINE

## 2021-12-25 PROCEDURE — 70450 CT HEAD/BRAIN W/O DYE: CPT

## 2021-12-25 PROCEDURE — 82533 TOTAL CORTISOL: CPT

## 2021-12-25 PROCEDURE — 82803 BLOOD GASES ANY COMBINATION: CPT

## 2021-12-25 PROCEDURE — 82747 ASSAY OF FOLIC ACID RBC: CPT

## 2021-12-25 PROCEDURE — 74011250637 HC RX REV CODE- 250/637: Performed by: FAMILY MEDICINE

## 2021-12-25 PROCEDURE — 84484 ASSAY OF TROPONIN QUANT: CPT

## 2021-12-25 PROCEDURE — 74011250637 HC RX REV CODE- 250/637: Performed by: STUDENT IN AN ORGANIZED HEALTH CARE EDUCATION/TRAINING PROGRAM

## 2021-12-25 PROCEDURE — 4A03X5D MEASUREMENT OF ARTERIAL FLOW, INTRACRANIAL, EXTERNAL APPROACH: ICD-10-PCS | Performed by: STUDENT IN AN ORGANIZED HEALTH CARE EDUCATION/TRAINING PROGRAM

## 2021-12-25 PROCEDURE — 74011250636 HC RX REV CODE- 250/636: Performed by: FAMILY MEDICINE

## 2021-12-25 PROCEDURE — 81001 URINALYSIS AUTO W/SCOPE: CPT

## 2021-12-25 PROCEDURE — 94760 N-INVAS EAR/PLS OXIMETRY 1: CPT

## 2021-12-25 PROCEDURE — 80048 BASIC METABOLIC PNL TOTAL CA: CPT

## 2021-12-25 PROCEDURE — 99223 1ST HOSP IP/OBS HIGH 75: CPT | Performed by: PSYCHIATRY & NEUROLOGY

## 2021-12-25 PROCEDURE — 0042T CT CODE NEURO PERF W CBF: CPT

## 2021-12-25 PROCEDURE — 74011250636 HC RX REV CODE- 250/636: Performed by: INTERNAL MEDICINE

## 2021-12-25 PROCEDURE — 70496 CT ANGIOGRAPHY HEAD: CPT

## 2021-12-25 PROCEDURE — 85027 COMPLETE CBC AUTOMATED: CPT

## 2021-12-25 PROCEDURE — 82962 GLUCOSE BLOOD TEST: CPT

## 2021-12-25 PROCEDURE — 83935 ASSAY OF URINE OSMOLALITY: CPT

## 2021-12-25 PROCEDURE — 84300 ASSAY OF URINE SODIUM: CPT

## 2021-12-25 PROCEDURE — 77030038269 HC DRN EXT URIN PURWCK BARD -A

## 2021-12-25 RX ORDER — SODIUM CHLORIDE 9 MG/ML
100 INJECTION, SOLUTION INTRAVENOUS CONTINUOUS
Status: DISCONTINUED | OUTPATIENT
Start: 2021-12-25 | End: 2021-12-25

## 2021-12-25 RX ORDER — 3% SODIUM CHLORIDE 3 G/100ML
25 INJECTION, SOLUTION INTRAVENOUS CONTINUOUS
Status: DISPENSED | OUTPATIENT
Start: 2021-12-25 | End: 2021-12-25

## 2021-12-25 RX ADMIN — IOPAMIDOL 50 ML: 755 INJECTION, SOLUTION INTRAVENOUS at 12:43

## 2021-12-25 RX ADMIN — AMIODARONE HYDROCHLORIDE 200 MG: 200 TABLET ORAL at 08:50

## 2021-12-25 RX ADMIN — PANTOPRAZOLE SODIUM 40 MG: 40 TABLET, DELAYED RELEASE ORAL at 07:25

## 2021-12-25 RX ADMIN — APIXABAN 5 MG: 5 TABLET, FILM COATED ORAL at 23:13

## 2021-12-25 RX ADMIN — Medication 10 ML: at 07:25

## 2021-12-25 RX ADMIN — PANTOPRAZOLE SODIUM 40 MG: 40 TABLET, DELAYED RELEASE ORAL at 19:29

## 2021-12-25 RX ADMIN — TRAMADOL HYDROCHLORIDE 50 MG: 50 TABLET, COATED ORAL at 19:41

## 2021-12-25 RX ADMIN — SODIUM CHLORIDE 100 ML/HR: 9 INJECTION, SOLUTION INTRAVENOUS at 13:13

## 2021-12-25 RX ADMIN — CYCLOBENZAPRINE 10 MG: 10 TABLET, FILM COATED ORAL at 08:50

## 2021-12-25 RX ADMIN — CARVEDILOL 25 MG: 12.5 TABLET, FILM COATED ORAL at 08:50

## 2021-12-25 RX ADMIN — IOPAMIDOL 100 ML: 755 INJECTION, SOLUTION INTRAVENOUS at 13:00

## 2021-12-25 RX ADMIN — CYCLOBENZAPRINE 10 MG: 10 TABLET, FILM COATED ORAL at 23:13

## 2021-12-25 RX ADMIN — SPIRONOLACTONE 50 MG: 25 TABLET ORAL at 08:49

## 2021-12-25 RX ADMIN — SODIUM CHLORIDE 25 ML/HR: 3 INJECTION, SOLUTION INTRAVENOUS at 16:46

## 2021-12-25 RX ADMIN — LEVOTHYROXINE SODIUM 75 MCG: 0.07 TABLET ORAL at 07:25

## 2021-12-25 RX ADMIN — ATORVASTATIN CALCIUM 40 MG: 40 TABLET, FILM COATED ORAL at 23:13

## 2021-12-25 RX ADMIN — APIXABAN 5 MG: 5 TABLET, FILM COATED ORAL at 08:50

## 2021-12-25 NOTE — PROGRESS NOTES
Cardiology Progress Note                                        Admit Date: 12/19/2021    Assessment/Plan:   PAF; no recurrence; on Amiodarone 200 mg a day; she could be discharged  HTN urgency; improved  Orthostatic drop of BPs; not as much and she is not symptomatic    Néstor Coreas is a 80 y.o. female with     PROBLEM LIST:  Patient Active Problem List    Diagnosis Date Noted    Hypertensive emergency 12/20/2021    Palpitations 12/19/2021    Chest pain 12/19/2021    LBBB (left bundle branch block) 04/08/2019    Acute chest pain 04/08/2019    Uncontrolled hypertension 04/08/2019         Subjective:     Néstor Coreas reports none. Visit Vitals  BP (!) 178/87 (BP 1 Location: Right upper arm, BP Patient Position: Lying left side)   Pulse (!) 55   Temp 97.8 °F (36.6 °C)   Resp 18   Ht 5' 8\" (1.727 m)   Wt 208 lb 8.9 oz (94.6 kg)   SpO2 96%   BMI 31.71 kg/m²       Intake/Output Summary (Last 24 hours) at 12/25/2021 0742  Last data filed at 12/24/2021 0817  Gross per 24 hour   Intake 240 ml   Output    Net 240 ml       Objective:      Physical Exam:  HEENT: Perrla, EOMI  Neck: No JVD,  No thyroidmegaly  Resp: CTA bilaterally;  No wheezes or rales  CV: RRR s1s2 No murmur no s3  Abd:Soft, Nontender  Ext: No edema  Neuro: Alert and oriented; Nonfocal  Skin: Warm, Dry, Intact  Pulses: 2+ DP/PT/Rad      Telemetry: normal sinus rhythm    Current Facility-Administered Medications   Medication Dose Route Frequency    methocarbamoL (ROBAXIN) injection 500 mg  500 mg IntraVENous Q8H PRN    amiodarone (CORDARONE) tablet 200 mg  200 mg Oral DAILY    carvediloL (COREG) tablet 25 mg  25 mg Oral BID WITH MEALS    cyclobenzaprine (FLEXERIL) tablet 5 mg  5 mg Oral TID PRN    cyclobenzaprine (FLEXERIL) tablet 10 mg  10 mg Oral TID PRN    morphine injection 1 mg  1 mg IntraVENous Q3H PRN    spironolactone (ALDACTONE) tablet 50 mg  50 mg Oral DAILY    losartan (COZAAR) tablet 100 mg  100 mg Oral QPM    apixaban (ELIQUIS) tablet 5 mg  5 mg Oral BID    levothyroxine (SYNTHROID) tablet 75 mcg  75 mcg Oral 6am    nitroglycerin (NITROSTAT) tablet 0.4 mg  0.4 mg SubLINGual PRN    atorvastatin (LIPITOR) tablet 40 mg  40 mg Oral QHS    pantoprazole (PROTONIX) tablet 40 mg  40 mg Oral ACB&D    traMADoL (ULTRAM) tablet 50 mg  50 mg Oral Q4H PRN    sodium chloride (NS) flush 5-40 mL  5-40 mL IntraVENous Q8H    sodium chloride (NS) flush 5-40 mL  5-40 mL IntraVENous PRN    acetaminophen (TYLENOL) tablet 650 mg  650 mg Oral Q6H PRN    Or    acetaminophen (TYLENOL) suppository 650 mg  650 mg Rectal Q6H PRN    polyethylene glycol (MIRALAX) packet 17 g  17 g Oral DAILY PRN    ondansetron (ZOFRAN ODT) tablet 4 mg  4 mg Oral Q8H PRN    Or    ondansetron (ZOFRAN) injection 4 mg  4 mg IntraVENous Q4H PRN         Data Review:   Labs:  No results found for this or any previous visit (from the past 24 hour(s)).

## 2021-12-25 NOTE — ADVANCED PRACTICE NURSE
Neurocritical Care Code Stroke Documentation      Symptoms:   +LOC X 8 minutes with hypotension   Last Known Well:  200 AM   Medical hx: Past Medical History:   Diagnosis Date    Arrhythmia     attempted ablation    Arthritis     GERD (gastroesophageal reflux disease)     High cholesterol     History of seasonal allergies     Hypertension     Sleep apnea     mild-does not use CPAP    Thyroid disease       Anticoagulation:  Eliquis    VAN:  Negative   NIHSS:   1a-LOC: 0    1b-Month/Age: 0    1c-Open/Close Hand: 0    2-Best Gaze: 0    3-Visual Fields:0    4-Facial Palsy:0    5a-Left Arm:0    5b-Right Arm:0    6a-Left Le    6b-Right Le    7-Limb Ataxia:0    8-Sensory:0    9-Best Language:0    10-Dysarthria:0    11-Extinction/Inattention:0  TOTAL SCORE:0   Imaging:   CT    CTA    CTP   Plan:   TPA Candidate: NO    Mechanical thrombectomy Candidate: NO     Discussed with: Dr Mindi Olszewski ( cardiology), Dr. Eduardo Arreaga ( hospitalist), RN and patient    Patient presented to the hospital with severe supine hypertension ( 220/140) yesterday with severe orthostatic hypotension ( 80/40) during this event. I believe she sustained a syncopal episode today related to hypotension. Dr. Mindi Olszewski is in agreement. Will make changes to her medication and notify hospitalist.     [place] time: 1205  Time spent: 40 minutes.      Rebecca Ken NP  Neurocritical Care Nurse Practitioner  [phone]

## 2021-12-25 NOTE — PROGRESS NOTES
2000: Bedside shift change report given to [employer] (oncoming nurse) by Felipe Henry (offgoing nurse). Report included the following information SBAR, Kardex, Intake/Output, MAR, Recent Results, and Cardiac Rhythm SB . Problem: Falls - Risk of  Goal: *Absence of Falls  Description: Document Starleen Freeze Fall Risk and appropriate interventions in the flowsheet. Outcome: Progressing Towards Goal  Note: Fall Risk Interventions:  Mobility Interventions: Communicate number of staff needed for ambulation/transfer,Patient to call before getting OOB,Utilize walker, cane, or other assistive device         Medication Interventions: Teach patient to arise slowly    Elimination Interventions: Patient to call for help with toileting needs              Problem: Hypertension  Goal: *Blood pressure within specified parameters  Outcome: Progressing Towards Goal  Note: BP improved. Medications adjusted.

## 2021-12-25 NOTE — PROGRESS NOTES
[facility] Adult  Hospitalist Group                                                                                          Hospitalist Progress Note  Mirna Upton MD  Answering service: [phone] from in house phone        Date of Service:  2021  NAME:  Analia Joy  :  1940  MRN:  [id number]      Admission Summary:   69y/o female with pmh of HLD, HTN who presented with recurrent chest pain in the context of HTN emergency. [facility]int [place] [facility] with BPs that were elevated and associated with chest pain. Her first visit, she was started on additional BP medications, and discharged after having neg enzymes, and neg EKG. She represented with additionally elevated BPs; and required admission. Interval history / Subjective:   Pt earliar seen, sitting up in recliner, denies chest pain headache,dizziness,noted with low   Sodium, repeat labs ordered  Rapid Response called,due to LOC hypotension, stroke code called      Assessment & Plan:     Unresponsiveness/LOC   -RR code stroke called  -CTA show no occlusion or stenosis show Large right MCA perfusion mismatch   - CVA or seizure    With  hypotension/hypnonatremia  -check MRI, EEG  -neuro consult   -allow permissible hypertension    Hyponatremia  -acute on chronic   -Na dropped to 120  -IVF NS   -consult, nephrology    Orthostatic hypotension  -ivf  -head of bed elevation    CARRILLO  -prerenal likely due to hypotension  -ivf     Chest pain  - Stress test negative  - Troponins negative  - EKG non focal   - Cardiology following        HTN emergency - with chest pain   Orthostatic hypotension   -off metoproloL  -s/pCoreg increased to 25 mg twice daily, amiodarone reduced to 200 mg daily had the patient heart rate drops. - Cardiology following    A. fib with RVR  Decrease amiodarone to 200 mg daily.   Already on Eliquis and Coreg.     GERD - start PPI   Hypothyroidism - levothyroxine  Mild hyponatremia     Code status: FULL  DVT prophylaxis: Eliquis     Care Plan discussed with: Patient/Family  Anticipated Disposition: Home w/Family  Anticipated Discharge:more than 48 hours      Hospital Problems  Date Reviewed: 12/20/2021          Codes Class Noted POA    Hypertensive emergency ICD-10-CM: I16.1  ICD-9-CM: 401.9  12/20/2021 Unknown        Palpitations ICD-10-CM: R00.2  ICD-9-CM: 785.1  12/19/2021 Unknown        Chest pain ICD-10-CM: R07.9  ICD-9-CM: 786.50  12/19/2021 Unknown                Review of Systems:   A comprehensive review of systems was negative except for that written in the HPI. Vital Signs:    Last 24hrs VS reviewed since prior progress note. Most recent are:  Visit Vitals  BP (!) 83/38   Pulse (!) 55   Temp 97.4 °F (36.3 °C)   Resp 22   Ht 5' 8\" (1.727 m)   Wt 94.6 kg (208 lb 8.9 oz)   SpO2 99%   BMI 31.71 kg/m²         Intake/Output Summary (Last 24 hours) at 12/25/2021 1419  Last data filed at 12/25/2021 0815  Gross per 24 hour   Intake 240 ml   Output    Net 240 ml        Physical Examination:     I had a face to face encounter with this patient and independently examined them on 12/25/2021 as outlined below:          Constitutional:  No acute pr does not remember what happened    ENT:  Oral mucosa moist, oropharynx benign. Resp:  CTA bilaterally. No wheezing/rhonchi/rales. No accessory muscle use   CV:  Regular rhythm, normal rate, no murmurs, gallops, rubs    GI:  Soft, non distended, non tender. normoactive bowel sounds, no hepatosplenomegaly     Musculoskeletal:  No edema, warm, 2+ pulses throughout     Neurologic:  Moves all extremities.   Awake,alert, repeating sentences   CN II-XII intact, no focal deficit            Data Review:    Review and/or order of clinical lab test  Review and/or order of tests in the radiology section of CPT  Review and/or order of tests in the medicine section of CPT      Labs:     Recent Labs     12/25/21  1212 12/23/21  0319   WBC 8.9 9.4   HGB 12.1 12.8   HCT 36.0 37.7    309     Recent Labs     12/25/21  1212 12/25/21  1211 12/23/21  0319   * 138 124*   K 3.9  --  3.6   CL 89*  --  92*   CO2 24  --  24   BUN 17  --  15   CREA 1.18*  --  0.95   *  --  119*   CA 8.9  --  9.2   MG 1.9  --  1.6   PHOS  --   --  3.0     No results for input(s): ALT, AP, TBIL, TBILI, TP, ALB, GLOB, GGT, AML, LPSE in the last 72 hours. No lab exists for component: SGOT, GPT, AMYP, HLPSE  No results for input(s): INR, PTP, APTT, INREXT, INREXT in the last 72 hours. No results for input(s): FE, TIBC, PSAT, FERR in the last 72 hours. No results found for: FOL, RBCF   No results for input(s): PH, PCO2, PO2 in the last 72 hours. No results for input(s): CPK, CKNDX, TROIQ in the last 72 hours.     No lab exists for component: CPKMB  No results found for: CHOL, CHOLX, CHLST, CHOLV, HDL, HDLP, LDL, LDLC, DLDLP, TGLX, TRIGL, TRIGP, CHHD, CHHDX  Lab Results   Component Value Date/Time    Glucose (POC) 104 12/25/2021 12:00 PM     No results found for: COLOR, APPRN, SPGRU, REFSG, EDWARD, PROTU, GLUCU, KETU, BILU, UROU, EARL, LEUKU, GLUKE, EPSU, BACTU, WBCU, RBCU, CASTS, UCRY      Medications Reviewed:     Current Facility-Administered Medications   Medication Dose Route Frequency    0.9% sodium chloride infusion  100 mL/hr IntraVENous CONTINUOUS    methocarbamoL (ROBAXIN) injection 500 mg  500 mg IntraVENous Q8H PRN    amiodarone (CORDARONE) tablet 200 mg  200 mg Oral DAILY    carvediloL (COREG) tablet 25 mg  25 mg Oral BID WITH MEALS    cyclobenzaprine (FLEXERIL) tablet 5 mg  5 mg Oral TID PRN    cyclobenzaprine (FLEXERIL) tablet 10 mg  10 mg Oral TID PRN    morphine injection 1 mg  1 mg IntraVENous Q3H PRN    spironolactone (ALDACTONE) tablet 50 mg  50 mg Oral DAILY    losartan (COZAAR) tablet 100 mg  100 mg Oral QPM    apixaban (ELIQUIS) tablet 5 mg  5 mg Oral BID    levothyroxine (SYNTHROID) tablet 75 mcg  75 mcg Oral 6am    nitroglycerin (NITROSTAT) tablet 0.4 mg 0.4 mg SubLINGual PRN    atorvastatin (LIPITOR) tablet 40 mg  40 mg Oral QHS    pantoprazole (PROTONIX) tablet 40 mg  40 mg Oral ACB&D    traMADoL (ULTRAM) tablet 50 mg  50 mg Oral Q4H PRN    sodium chloride (NS) flush 5-40 mL  5-40 mL IntraVENous Q8H    sodium chloride (NS) flush 5-40 mL  5-40 mL IntraVENous PRN    acetaminophen (TYLENOL) tablet 650 mg  650 mg Oral Q6H PRN    Or    acetaminophen (TYLENOL) suppository 650 mg  650 mg Rectal Q6H PRN    polyethylene glycol (MIRALAX) packet 17 g  17 g Oral DAILY PRN    ondansetron (ZOFRAN ODT) tablet 4 mg  4 mg Oral Q8H PRN    Or    ondansetron (ZOFRAN) injection 4 mg  4 mg IntraVENous Q4H PRN     ______________________________________________________________________  EXPECTED LENGTH OF STAY: 2d 4h  ACTUAL LENGTH OF STAY:          4                 Alfredo Shetty MD

## 2021-12-25 NOTE — CONSULTS
[facility]   [address] [facility], [id number] [place] [address]  Phone: [phone]   [phone] NOTE     Patient: Millie Singh MRN: [id number]  PCP: Edin Landeros MD   :     1940  Age:   80 y.o. Sex:  female      Referring physician: Zehra Teran MD  Reason for consultation: 80 y.o. female with Chest pain [R07.9]  Palpitations [R00.2]  Hypertensive emergency [V57.7] complicated by Hyponatremia   Admission Date: 2021  1:45 AM  LOS: 4 days      ASSESSMENT and PLAN :   Acute Hyponatremia :  - 2/2 combination of Thiazide and MRB (aldactone )  - acute and symptomatic   - stopped NS. Urine chemistries, cortisol pending . TSH ok  - ordered 3% saline : 100 cc total over 4 hrs   - Goal Na 126 by midnight and 128 by tomorrow am  - stopped aldactone   - FR 1200 cc   - labs Q6 hr    Supine HTN   Orthostatic Hypotension :  - Renal duplex : No LETICIA  - Hypokalemia : 2/2 Thiazide use and not suspecting hyperaldo   - mx per cards     Lumbar spinal stenosis     Hypothyroidism     Mild CKD : based on imaging     Care Plan discussed with:  Pt and RN         Thank you for consulting [facility] in the care of your patient. Subjective:   HPI: Millie Singh is a 80 y.o.  female who has been admitted to the hospital for chest pain, uncontrolled HTN. Since admission, she had downward trend of serum Na and its down to 120 this afternoon that prompted urgent renal consult. Since admission she has been seen  By cardiology for uncontrolled HTN and underwent renal duplex that was normal. She was taken off lasix and started on HCTZ and later Aldactone was added and dose doubled. On one day she received 25 mg of HCTZ and 25 mg aldactone. She developed orthostatic Hypotension and had stroke code this morning   She reports being diagnosed w/ lumbar spinal stenosis in lthe last few months and has been having difficult to control HTN since. previosuly well controlled for over 10 years   Had Na of 134 on admission and previously lowest was 133 in 2019      Past Medical Hx:   Past Medical History:   Diagnosis Date    Arrhythmia     attempted ablation    Arthritis     GERD (gastroesophageal reflux disease)     High cholesterol     History of seasonal allergies     Hypertension     Sleep apnea     mild-does not use CPAP    Thyroid disease         Past Surgical Hx:     Past Surgical History:   Procedure Laterality Date    COLONOSCOPY N/A 10/14/2020    COLONOSCOPY    :- performed by Atif Humphrey [facility] at [facility] ENDOSCOPY    HX BREAST BIOPSY Right Years ago    Negative    HX CATARACT REMOVAL      bilateral    HX OTHER SURGICAL      cyst removed from left cheek    [facility] BREAST SURGERY PROCEDURE UNLISTED      breast biopsy-local - benign    [facility] CARDIAC SURG PROCEDURE UNLIST      attempted ablation         No Known Allergies    Social Hx:  reports that she quit smoking about 47 years ago. She quit after 10.00 years of use. She has never used smokeless tobacco. She reports current alcohol use. She reports that she does not use drugs. Family History   Problem Relation Age of Onset    Other Mother         multiple myeloma    Stroke Father     Heart Disease Brother         ablation       Review of Systems:  A thorough twelve point review of system was performed today. Pertinent positives and negatives are mentioned in the HPI. The reminder of the ROS is negative and noncontributory.      Objective:    Vitals:    Vitals:    12/25/21 1158 12/25/21 1205 12/25/21 1214 12/25/21 1400   BP:  (!) 83/38     Pulse: (!) 53 (!) 53 (!) 55 (!) 55   Resp:       Temp:       SpO2: 99% 99%     Weight:       Height:         I&O's:  12/24 0701 - 12/25 0700  In: 240 [P.O.:240]  Out: -   Visit Vitals  BP (!) 83/38   Pulse (!) 55   Temp 97.4 °F (36.3 °C)   Resp 22   Ht 5' 8\" (1.727 m)   Wt 94.6 kg (208 lb 8.9 oz)   SpO2 99%   BMI 31.71 kg/m²       Physical Exam:  General: No apparent Distress  HEENT: PERRL,  No Pallor , No Icterus  Neck: Supple,no mass palpable  Lungs : CTA  CVS: RRR, S1 S2 normal, No murmur   Abdomen: Soft, NT, BS +  Extremities: Edema  Skin: No rash or lesions. MS: No joint swelling, erythema, warmth  Neurologic: non focal, AAO x 3  Psych: normal affect    Laboratory Results:    Recent Labs     12/25/21  1212 12/25/21  1211 12/23/21  0319   * 138 124*   K 3.9  --  3.6   CL 89*  --  92*   CO2 24  --  24   *  --  119*   BUN 17  --  15   CREA 1.18*  --  0.95   CA 8.9  --  9.2   MG 1.9  --  1.6   PHOS  --   --  3.0     Recent Labs     12/25/21  1212 12/23/21  0319   WBC 8.9 9.4   HGB 12.1 12.8   HCT 36.0 37.7    309     No results found for: SDES  No results found for: CULT  Recent Results (from the past 24 hour(s))   GLUCOSE, POC    Collection Time: 12/25/21 12:00 PM   Result Value Ref Range    Glucose (POC) 104 65 - 117 mg/dL    Performed by Katherine Haas    SODIUM    Collection Time: 12/25/21 12:11 PM   Result Value Ref Range    Sodium 138 136 - 145 mmol/L   SAMPLES BEING HELD    Collection Time: 12/25/21 12:12 PM   Result Value Ref Range    SAMPLES BEING HELD 1 SST, 1 DRAREN     COMMENT        Add-on orders for these samples will be processed based on acceptable specimen integrity and analyte stability, which may vary by analyte.    CBC W/O DIFF    Collection Time: 12/25/21 12:12 PM   Result Value Ref Range    WBC 8.9 3.6 - 11.0 K/uL    RBC 3.95 3.80 - 5.20 M/uL    HGB 12.1 11.5 - 16.0 g/dL    HCT 36.0 35.0 - 47.0 %    MCV 91.1 80.0 - 99.0 FL    MCH 30.6 26.0 - 34.0 PG    MCHC 33.6 30.0 - 36.5 g/dL    RDW 12.4 11.5 - 14.5 %    PLATELET 208 519 - 088 K/uL    MPV 8.3 (L) 8.9 - 12.9 FL    NRBC 0.0 0  WBC    ABSOLUTE NRBC 0.00 0.00 - 0.01 K/uL   LACTIC ACID    Collection Time: 12/25/21 12:12 PM   Result Value Ref Range    Lactic acid 1.7 0.4 - 2.0 MMOL/L   TROPONIN-HIGH SENSITIVITY    Collection Time: 12/25/21 12:12 PM   Result Value Ref Range Troponin-High Sensitivity 17 0 - 51 ng/L   METABOLIC PANEL, BASIC    Collection Time: 12/25/21 12:12 PM   Result Value Ref Range    Sodium 120 (L) 136 - 145 mmol/L    Potassium 3.9 3.5 - 5.1 mmol/L    Chloride 89 (L) 97 - 108 mmol/L    CO2 24 21 - 32 mmol/L    Anion gap 7 5 - 15 mmol/L    Glucose 107 (H) 65 - 100 mg/dL    BUN 17 6 - 20 MG/DL    Creatinine 1.18 (H) 0.55 - 1.02 MG/DL    BUN/Creatinine ratio 14 12 - 20      GFR est AA 54 (L) >60 ml/min/1.73m2    GFR est non-AA 44 (L) >60 ml/min/1.73m2    Calcium 8.9 8.5 - 10.1 MG/DL   MAGNESIUM    Collection Time: 12/25/21 12:12 PM   Result Value Ref Range    Magnesium 1.9 1.6 - 2.4 mg/dL   POC VENOUS BLOOD GAS    Collection Time: 12/25/21  1:22 PM   Result Value Ref Range    Device: NASAL CANNULA      pH, venous (POC) 7.38 7.32 - 7.42      pCO2, venous (POC) 41.2 41 - 51 MMHG    pO2, venous (POC) 28 25 - 40 mmHg    HCO3, venous (POC) 24.3 23.0 - 28.0 MMOL/L    sO2, venous (POC) 50.2 (L) 65 - 88 %    Base deficit, venous (POC) 0.9 mmol/L    Allens test (POC) Negative      Site RIGHT RADIAL      Specimen type (POC) VENOUS BLOOD      Performed by Adolfo Roldan          Urine dipstick:   No results found for: COLOR, APPRN, SPGRU, REFSG, EDWARD, PROTU, GLUCU, KETU, BILU, UROU, EARL, LEUKU, GLUKE, EPSU, BACTU, WBCU, RBCU, CASTS, UCRY    I have reviewed the following: All pertinent labs, microbiology data, radiology imaging for my assessment     Medications list Personally Reviewed   [x]      Yes     []               No       Medications:  Prior to Admission medications    Medication Sig Start Date End Date Taking? Authorizing Provider   carvediloL (COREG) 25 mg tablet Take 1 Tablet by mouth two (2) times daily (with meals) for 30 days. 12/20/21 1/19/22 Yes Severiano Pique I, MD   hydroCHLOROthiazide (HYDRODIURIL) 25 mg tablet Take 1 Tablet by mouth daily for 30 days.  12/21/21 1/20/22 Yes Severiano Pique I, MD   losartan (COZAAR) 100 mg tablet Take 1 Tablet by mouth every evening for 30 days. 12/20/21 1/19/22 Yes Theron SOMMER MD   pantoprazole (PROTONIX) 40 mg tablet Take 1 Tablet by mouth Before breakfast and dinner for 14 days. 12/20/21 1/3/22 Yes Theron SOMMER MD   lisinopriL (PRINIVIL, ZESTRIL) 20 mg tablet Take 20 mg by mouth daily. Yes Provider, Historical   furosemide (Lasix) 20 mg tablet Take 1 Tablet by mouth daily. 12/17/21  Yes Mariposa Love MD   apixaban (Eliquis) 5 mg tablet Take 5 mg by mouth two (2) times a day. Yes Mae, MD Rosa   traMADoL (ULTRAM) 50 mg tablet Take 50 mg by mouth every six (6) hours as needed for Pain. Yes Mae, MD Rosa   metoprolol tartrate (LOPRESSOR) 50 mg tablet Take 50 mg by mouth every evening. Yes Mae, MD Rosa   atorvastatin (LIPITOR) 40 mg tablet Take 40 mg by mouth daily. Yes Other, MD Rosa   levothyroxine (SYNTHROID) 75 mcg tablet Take 75 mcg by mouth Daily (before breakfast). Yes Provider, Historical   co-enzyme Q-10 (COQ-10) 100 mg capsule Take 100 mg by mouth daily. Yes Provider, Historical        Thank you for allowing us to participate in the care of this patient. We will follow patient. Please dont hesitate to call with any questions    Teri Skinner MD  [facility]   [address] [facility] [address]  Phone - [phone]   Fax - [phone]  [email or web address]

## 2021-12-26 ENCOUNTER — APPOINTMENT (OUTPATIENT)
Dept: VASCULAR SURGERY | Age: 81
DRG: 305 | End: 2021-12-26
Attending: INTERNAL MEDICINE
Payer: MEDICARE

## 2021-12-26 LAB
ALBUMIN SERPL-MCNC: 3 G/DL (ref 3.5–5)
ALBUMIN/GLOB SERPL: 0.8 {RATIO} (ref 1.1–2.2)
ALP SERPL-CCNC: 74 U/L (ref 45–117)
ALT SERPL-CCNC: 20 U/L (ref 12–78)
ANION GAP SERPL CALC-SCNC: 8 MMOL/L (ref 5–15)
AST SERPL-CCNC: 16 U/L (ref 15–37)
BILIRUB SERPL-MCNC: 0.6 MG/DL (ref 0.2–1)
BUN SERPL-MCNC: 17 MG/DL (ref 6–20)
BUN/CREAT SERPL: 15 (ref 12–20)
CALCIUM SERPL-MCNC: 9.1 MG/DL (ref 8.5–10.1)
CHLORIDE SERPL-SCNC: 95 MMOL/L (ref 97–108)
CO2 SERPL-SCNC: 23 MMOL/L (ref 21–32)
CREAT SERPL-MCNC: 1.12 MG/DL (ref 0.55–1.02)
ERYTHROCYTE [DISTWIDTH] IN BLOOD BY AUTOMATED COUNT: 12.8 % (ref 11.5–14.5)
GLOBULIN SER CALC-MCNC: 3.7 G/DL (ref 2–4)
GLUCOSE SERPL-MCNC: 125 MG/DL (ref 65–100)
HCT VFR BLD AUTO: 36.9 % (ref 35–47)
HGB BLD-MCNC: 12.6 G/DL (ref 11.5–16)
MCH RBC QN AUTO: 31 PG (ref 26–34)
MCHC RBC AUTO-ENTMCNC: 34.1 G/DL (ref 30–36.5)
MCV RBC AUTO: 90.7 FL (ref 80–99)
NRBC # BLD: 0 K/UL (ref 0–0.01)
NRBC BLD-RTO: 0 PER 100 WBC
PLATELET # BLD AUTO: 296 K/UL (ref 150–400)
PMV BLD AUTO: 8.5 FL (ref 8.9–12.9)
POTASSIUM SERPL-SCNC: 3.6 MMOL/L (ref 3.5–5.1)
PROT SERPL-MCNC: 6.7 G/DL (ref 6.4–8.2)
RBC # BLD AUTO: 4.07 M/UL (ref 3.8–5.2)
SODIUM SERPL-SCNC: 126 MMOL/L (ref 136–145)
SODIUM SERPL-SCNC: 126 MMOL/L (ref 136–145)
SODIUM SERPL-SCNC: 128 MMOL/L (ref 136–145)
SODIUM SERPL-SCNC: 129 MMOL/L (ref 136–145)
WBC # BLD AUTO: 8.3 K/UL (ref 3.6–11)

## 2021-12-26 PROCEDURE — 74011250637 HC RX REV CODE- 250/637: Performed by: INTERNAL MEDICINE

## 2021-12-26 PROCEDURE — 85027 COMPLETE CBC AUTOMATED: CPT

## 2021-12-26 PROCEDURE — 74011250637 HC RX REV CODE- 250/637: Performed by: STUDENT IN AN ORGANIZED HEALTH CARE EDUCATION/TRAINING PROGRAM

## 2021-12-26 PROCEDURE — 65660000000 HC RM CCU STEPDOWN

## 2021-12-26 PROCEDURE — 74011250637 HC RX REV CODE- 250/637: Performed by: FAMILY MEDICINE

## 2021-12-26 PROCEDURE — 84295 ASSAY OF SERUM SODIUM: CPT

## 2021-12-26 PROCEDURE — 93880 EXTRACRANIAL BILAT STUDY: CPT

## 2021-12-26 PROCEDURE — 80053 COMPREHEN METABOLIC PANEL: CPT

## 2021-12-26 PROCEDURE — 36415 COLL VENOUS BLD VENIPUNCTURE: CPT

## 2021-12-26 PROCEDURE — 74011250636 HC RX REV CODE- 250/636: Performed by: INTERNAL MEDICINE

## 2021-12-26 RX ORDER — SODIUM CHLORIDE 9 MG/ML
75 INJECTION, SOLUTION INTRAVENOUS CONTINUOUS
Status: DISPENSED | OUTPATIENT
Start: 2021-12-26 | End: 2021-12-27

## 2021-12-26 RX ORDER — FLUDROCORTISONE ACETATE 0.1 MG/1
0.1 TABLET ORAL DAILY
Status: DISCONTINUED | OUTPATIENT
Start: 2021-12-26 | End: 2021-12-27

## 2021-12-26 RX ADMIN — SODIUM CHLORIDE 75 ML/HR: 900 INJECTION, SOLUTION INTRAVENOUS at 08:00

## 2021-12-26 RX ADMIN — APIXABAN 5 MG: 5 TABLET, FILM COATED ORAL at 21:59

## 2021-12-26 RX ADMIN — PANTOPRAZOLE SODIUM 40 MG: 40 TABLET, DELAYED RELEASE ORAL at 15:53

## 2021-12-26 RX ADMIN — AMIODARONE HYDROCHLORIDE 200 MG: 200 TABLET ORAL at 09:27

## 2021-12-26 RX ADMIN — PANTOPRAZOLE SODIUM 40 MG: 40 TABLET, DELAYED RELEASE ORAL at 06:30

## 2021-12-26 RX ADMIN — Medication 10 ML: at 22:00

## 2021-12-26 RX ADMIN — Medication 10 ML: at 14:00

## 2021-12-26 RX ADMIN — APIXABAN 5 MG: 5 TABLET, FILM COATED ORAL at 09:27

## 2021-12-26 RX ADMIN — CYCLOBENZAPRINE 10 MG: 10 TABLET, FILM COATED ORAL at 06:21

## 2021-12-26 RX ADMIN — CYCLOBENZAPRINE 10 MG: 10 TABLET, FILM COATED ORAL at 21:59

## 2021-12-26 RX ADMIN — CYCLOBENZAPRINE 10 MG: 10 TABLET, FILM COATED ORAL at 15:52

## 2021-12-26 RX ADMIN — Medication 10 ML: at 06:00

## 2021-12-26 RX ADMIN — LEVOTHYROXINE SODIUM 75 MCG: 0.07 TABLET ORAL at 06:21

## 2021-12-26 RX ADMIN — ATORVASTATIN CALCIUM 40 MG: 40 TABLET, FILM COATED ORAL at 21:59

## 2021-12-26 NOTE — PROGRESS NOTES
1930: Bedside shift change report given to Veronika Madrid (oncoming nurse) by Lalita Upton (offgoing nurse). Report included the following information SBAR, Kardex, Intake/Output, MAR, Recent Results, and Cardiac Rhythm SB .     2200:  Spoke with Dolly Goodpasture, NP regarding lab results. No new orders received. Next lab draw at 0300    Problem: Falls - Risk of  Goal: *Absence of Falls  Description: Document Danne Lobe Fall Risk and appropriate interventions in the flowsheet. Outcome: Progressing Towards Goal  Note: Fall Risk Interventions:  Mobility Interventions: Communicate number of staff needed for ambulation/transfer,Utilize walker, cane, or other assistive device         Medication Interventions: Teach patient to arise slowly    Elimination Interventions: Patient to call for help with toileting needs              Problem: Hypertension  Goal: *Blood pressure within specified parameters  Outcome: Progressing Towards Goal  Note: VSS. Goal: *Labs within defined limits  Outcome: Progressing Towards Goal  Note: Low sodium corrected by nephrology. Q6h labs ordered.

## 2021-12-26 NOTE — CONSULTS
[address]    Name:  Lena Myers  MR#:  [id number]  :  1940  ACCOUNT #:  [de-identified]  DATE OF SERVICE:  2021    NEUROLOGY CONSULTATION    HISTORY OF PRESENT ILLNESS:  This is a 74-year-old right-handed female who was admitted on  with labile blood pressures. Today she was sitting in a chair at the bedside, when she rang the call bell for the nurse and stated she needed to get back into bed. The nurse handed her a walker, the patient put her hands on the walker and went to stand and suddenly her head bowed down. The nurse realized she was unresponsive, sat back in the chair. She remained sitting up and was not responding to sternal rub. Her lips were turning blue. A rapid response was called. They were unable to obtain a blood pressure in the seated position. When they tried to move her to the bed, she felt stiff and urinated on herself. After she was supine, BP was 80s/40s. Her supine hypertension had been as high as 220/140 earlier in the day. CT of the head was unremarkable. CTA of the head and neck showed zero stenosis of the carotid arteries. She has moderate atherosclerotic disease of the intracranial carotid arteries but no significant stenosis. No vascular abnormalities seen on review by [facility] per discussion with Roxanna Zaragoza NP who saw her acutely. CT perfusion, however, was read as showing a large right MCA territory perfusion mismatch. The patient's sodium at the time of this event was 120. The patient has been seen by Dr. Scot Thibodeaux in Cardiology and medications have been adjusted. Carotid Dopplers are ordered. MRI of the brain is pending. The patient had difficulty remembering the event or what happened just prior to the event. She did recall sitting in the chair. Recalls her sons being there earlier in the day.   Her memory has slowly been returning from the time just before and after this event but initially had some confusion and did not remember it was [place], was repeating questions. No other complaints or neurological symptoms reported by the patient. PAST MEDICAL HISTORY:  1. Hypertension. 2.  Hyperlipidemia. 3.  AFib, with attempted ablation. 4.  Arthritis. 5.  Gastroesophageal reflux disease. 7.  BRENNAN, is not on CPAP. 8.  History of Takotsubo cardiomyopathy in 2019.  9.  Paroxysmal SVT. 10.  Degenerative joint disease. 11.  Moderate-to-severe lumbar spinal stenosis on MRI, 02/22/2021. 12.  Status post cataract surgery. 13.  Known hypotension with bradycardia that is symptomatic, consistent with vasovagal response. REVIEW OF SYSTEMS:  As per past medical history and HPI, otherwise, reviewed and negative. The patient reports low back pain radiating down with radicular pain and history of steroid injections. MEDICATIONS AT HOME:  1.  Eliquis. 2.  Carvedilol. 3.  Hydrochlorothiazide. 4.  Losartan. 5.  Protonix. 6.  Lisinopril. 7.  Furosemide. 8.  Tramadol. 9.  Metoprolol. 10.  Atorvastatin. 11.  Synthroid. 12.  Coenzyme Q10.  13.  Since this event, she has been started on 3% saline per Nephrology. 14.  She is on tramadol. ALLERGIES:  NO KNOWN DRUG ALLERGIES. SOCIAL HISTORY:  She lives in [facility] with her . She quit smoking in 1974. No drug use. Occasional alcohol. FAMILY HISTORY:  Again, no history of seizures in the family. PHYSICAL EXAMINATION:  VITAL SIGNS:  Blood pressure 142/79, pulse 59, respiratory rate is 20, saturating 98% on room air, temperature is 98. Again, at the time of this event, no blood pressure could be obtained. After supine, her blood pressure was 83/38 with a heart rate of 853. BMI 31. GENERAL:  Well-nourished, well-developed, obese female lying in bed in no distress. HEART:  Bradycardic, regular rhythm. Carotids are 2+. No bruits. EXTREMITIES:  Warm. No edema. 2+ radial pulses. NEUROLOGIC EXAMINATION:  Mental Status:  Alert, oriented.   Speech and language are intact. Attention, Memory, and Fund of Knowledge:  Appropriate. Cranial Nerves:  She has no facial asymmetry or ptosis. Extraocular eye movements are intact without diplopia or nystagmus. Visual fields are full. Pupils are round and reactive. Tongue is midline. Palate elevates symmetrically. Strength, sensation, and hearing intact. Motor:  5/5 throughout. No pronator drift. No tremor. Sensory:  Intact to light touch and pinprick throughout. Reflexes are symmetric. Toes are downgoing. Coordination:  Intact to finger-to-nose, rapid alternating movements. Gait:  Not assessed due to patient factors. STUDIES AND REPORTS:  In addition to that reviewed above in the HPI, her glucose at the time of the event was 104. Sodium 120, creatinine 1.18, calcium 8.9. Lactic acid 1.7. CBC unremarkable. Magnesium 1.9.  TSH 0.94. B12 of 671. ASSESSMENT AND PLAN:  This is a 61-year-old right-handed female with labile blood pressures and known history of hypotension with bradycardia that is symptomatic with episode of syncope while sitting in a chair with blood pressure dropping into the 80s/40s from the 220s/140s, who immediately after the event had a normal neuro exam, and continues having normal neuro exam despite CTP with perfusion mismatch in the right MCA distribution of unclear etiology or significance with normal vasculature seen on her CTA. I agree with planned MRI to fully exclude a watershed infarct though this is low likelihood at this point. The patient may have had a seizure, but if so, this was symptomatic related to her severe hypotension and/or a sodium of 120 and not a primary seizure causing this event. This was discussed with the nurse caring for the patient at the time of evaluation.       Lucien Case MD      [id number]  D:  12/26/2021 8:30  T:  12/26/2021 10:18  JOB #:  [id number]

## 2021-12-26 NOTE — PROGRESS NOTES
Cardiology Progress Note                                        Admit Date: 12/19/2021    Assessment/Plan:     Syncope; yesterday with some seizure, all due to hypotension as she sat up for a while; we held all BP regimen  HTN; urgency on POA but we are checking for any recurrence of high BPs  Orthostatic hypotension; she clearly dropped her BPs significantly; she is not a candidate for Midodrine but will try Florinef 0.1mg a day  PAF; no recurrence  ?seizure; she does have abnormal CT of head; ?hypoperfused area    Johnny Fischer is a 80 y.o. female with     PROBLEM LIST:  Patient Active Problem List    Diagnosis Date Noted    Hypertensive emergency 12/20/2021    Palpitations 12/19/2021    Chest pain 12/19/2021    LBBB (left bundle branch block) 04/08/2019    Acute chest pain 04/08/2019    Uncontrolled hypertension 04/08/2019         Subjective:     Johnny Fischer reports none. Visit Vitals  BP (!) 167/75 (BP 1 Location: Right upper arm, BP Patient Position: At rest)   Pulse (!) 54   Temp 97.7 °F (36.5 °C)   Resp 17   Ht 5' 8\" (1.727 m)   Wt 205 lb 7.5 oz (93.2 kg)   SpO2 93%   BMI 31.24 kg/m²     No intake or output data in the 24 hours ending 12/26/21 0856    Objective:      Physical Exam:  HEENT: [place], EOMI  Neck: No JVD,  No thyroidmegaly  Resp: CTA bilaterally;  No wheezes or rales  CV: RRR s1s2 No murmur no s3  Abd:Soft, Nontender  Ext: No edema  Neuro: Alert and oriented; Nonfocal  Skin: Warm, Dry, Intact  Pulses: 2+ DP/PT/Rad      Telemetry: normal sinus rhythm    Current Facility-Administered Medications   Medication Dose Route Frequency    0.9% sodium chloride infusion  75 mL/hr IntraVENous CONTINUOUS    methocarbamoL (ROBAXIN) injection 500 mg  500 mg IntraVENous Q8H PRN    amiodarone (CORDARONE) tablet 200 mg  200 mg Oral DAILY    [Held by provider] carvediloL (COREG) tablet 25 mg  25 mg Oral BID WITH MEALS    cyclobenzaprine (FLEXERIL) tablet 5 mg  5 mg Oral TID PRN    cyclobenzaprine (FLEXERIL) tablet 10 mg  10 mg Oral TID PRN    morphine injection 1 mg  1 mg IntraVENous Q3H PRN    [Held by provider] losartan (COZAAR) tablet 100 mg  100 mg Oral QPM    apixaban (ELIQUIS) tablet 5 mg  5 mg Oral BID    levothyroxine (SYNTHROID) tablet 75 mcg  75 mcg Oral 6am    nitroglycerin (NITROSTAT) tablet 0.4 mg  0.4 mg SubLINGual PRN    atorvastatin (LIPITOR) tablet 40 mg  40 mg Oral QHS    pantoprazole (PROTONIX) tablet 40 mg  40 mg Oral ACB&D    traMADoL (ULTRAM) tablet 50 mg  50 mg Oral Q4H PRN    sodium chloride (NS) flush 5-40 mL  5-40 mL IntraVENous Q8H    sodium chloride (NS) flush 5-40 mL  5-40 mL IntraVENous PRN    acetaminophen (TYLENOL) tablet 650 mg  650 mg Oral Q6H PRN    Or    acetaminophen (TYLENOL) suppository 650 mg  650 mg Rectal Q6H PRN    polyethylene glycol (MIRALAX) packet 17 g  17 g Oral DAILY PRN    ondansetron (ZOFRAN ODT) tablet 4 mg  4 mg Oral Q8H PRN    Or    ondansetron (ZOFRAN) injection 4 mg  4 mg IntraVENous Q4H PRN         Data Review:   Labs:    Recent Results (from the past 24 hour(s))   GLUCOSE, POC    Collection Time: 12/25/21 12:00 PM   Result Value Ref Range    Glucose (POC) 104 65 - 117 mg/dL    Performed by Shasha Balderrama    SODIUM    Collection Time: 12/25/21 12:11 PM   Result Value Ref Range    Sodium PLEASE DISREGARD RESULTS 136 - 145 mmol/L   SAMPLES BEING HELD    Collection Time: 12/25/21 12:12 PM   Result Value Ref Range    SAMPLES BEING HELD 1 SST, 1 DARREN,1UC     COMMENT        Add-on orders for these samples will be processed based on acceptable specimen integrity and analyte stability, which may vary by analyte.    CBC W/O DIFF    Collection Time: 12/25/21 12:12 PM   Result Value Ref Range    WBC 8.9 3.6 - 11.0 K/uL    RBC 3.95 3.80 - 5.20 M/uL    HGB 12.1 11.5 - 16.0 g/dL    HCT 36.0 35.0 - 47.0 %    MCV 91.1 80.0 - 99.0 FL    MCH 30.6 26.0 - 34.0 PG    MCHC 33.6 30.0 - 36.5 g/dL    RDW 12.4 11.5 - 14.5 %    PLATELET 177 673 - 021 K/uL    MPV 8.3 (L) 8.9 - 12.9 FL    NRBC 0.0 0  WBC    ABSOLUTE NRBC 0.00 0.00 - 0.01 K/uL   LACTIC ACID    Collection Time: 12/25/21 12:12 PM   Result Value Ref Range    Lactic acid 1.7 0.4 - 2.0 MMOL/L   TROPONIN-HIGH SENSITIVITY    Collection Time: 12/25/21 12:12 PM   Result Value Ref Range    Troponin-High Sensitivity PLEASE DISREGARD RESULTS 0 - 51 ng/L   METABOLIC PANEL, BASIC    Collection Time: 12/25/21 12:12 PM   Result Value Ref Range    Sodium 120 (L) 136 - 145 mmol/L    Potassium 3.9 3.5 - 5.1 mmol/L    Chloride 89 (L) 97 - 108 mmol/L    CO2 24 21 - 32 mmol/L    Anion gap 7 5 - 15 mmol/L    Glucose 107 (H) 65 - 100 mg/dL    BUN 17 6 - 20 MG/DL    Creatinine 1.18 (H) 0.55 - 1.02 MG/DL    BUN/Creatinine ratio 14 12 - 20      GFR est AA 54 (L) >60 ml/min/1.73m2    GFR est non-AA 44 (L) >60 ml/min/1.73m2    Calcium 8.9 8.5 - 10.1 MG/DL   MAGNESIUM    Collection Time: 12/25/21 12:12 PM   Result Value Ref Range    Magnesium 1.9 1.6 - 2.4 mg/dL   TSH 3RD GENERATION    Collection Time: 12/25/21 12:12 PM   Result Value Ref Range    TSH 0.94 0.36 - 3.74 uIU/mL   VITAMIN B12    Collection Time: 12/25/21 12:12 PM   Result Value Ref Range    Vitamin B12 671 193 - 986 pg/mL   CORTISOL, AM    Collection Time: 12/25/21 12:12 PM   Result Value Ref Range    Cortisol, a.m. 10.5 4.30 - 22.45 ug/dL   TSH 3RD GENERATION    Collection Time: 12/25/21 12:12 PM   Result Value Ref Range    TSH 0.93 0.36 - 3.74 uIU/mL   TROPONIN-HIGH SENSITIVITY    Collection Time: 12/25/21 12:12 PM   Result Value Ref Range    Troponin-High Sensitivity 119 (HH) 0 - 51 ng/L   POC VENOUS BLOOD GAS    Collection Time: 12/25/21  1:22 PM   Result Value Ref Range    Device: NASAL CANNULA      pH, venous (POC) 7.38 7.32 - 7.42      pCO2, venous (POC) 41.2 41 - 51 MMHG    pO2, venous (POC) 28 25 - 40 mmHg    HCO3, venous (POC) 24.3 23.0 - 28.0 MMOL/L    sO2, venous (POC) 50.2 (L) 65 - 88 %    Base deficit, venous (POC) 0.9 mmol/L    Allens test (POC) Negative Site RIGHT RADIAL      Specimen type (POC) VENOUS BLOOD      Performed by Lexy BROWN    SODIUM, UR, RANDOM    Collection Time: 12/25/21  5:04 PM   Result Value Ref Range    Sodium,urine random 24 MMOL/L   OSMOLALITY, UR    Collection Time: 12/25/21  5:04 PM   Result Value Ref Range    Osmolality,urine 146 MOSM/kg H2O   POTASSIUM, UR, RANDOM    Collection Time: 12/25/21  5:04 PM   Result Value Ref Range    Potassium urine, random 5 MMOL/L   URINALYSIS W/MICROSCOPIC    Collection Time: 12/25/21  5:04 PM   Result Value Ref Range    Color YELLOW/STRAW      Appearance CLEAR CLEAR      Specific gravity 1.016 1.003 - 1.030      pH (UA) 7.0 5.0 - 8.0      Protein Negative NEG mg/dL    Glucose Negative NEG mg/dL    Ketone Negative NEG mg/dL    Bilirubin Negative NEG      Blood Negative NEG      Urobilinogen 0.2 0.2 - 1.0 EU/dL    Nitrites Negative NEG      Leukocyte Esterase Negative NEG      WBC 0-4 0 - 4 /hpf    RBC 0-5 0 - 5 /hpf    Epithelial cells FEW FEW /lpf    Bacteria Negative NEG /hpf   SODIUM    Collection Time: 12/25/21  9:16 PM   Result Value Ref Range    Sodium 125 (L) 136 - 145 mmol/L   CBC W/O DIFF    Collection Time: 12/26/21  3:15 AM   Result Value Ref Range    WBC 8.3 3.6 - 11.0 K/uL    RBC 4.07 3.80 - 5.20 M/uL    HGB 12.6 11.5 - 16.0 g/dL    HCT 36.9 35.0 - 47.0 %    MCV 90.7 80.0 - 99.0 FL    MCH 31.0 26.0 - 34.0 PG    MCHC 34.1 30.0 - 36.5 g/dL    RDW 12.8 11.5 - 14.5 %    PLATELET 448 322 - 914 K/uL    MPV 8.5 (L) 8.9 - 12.9 FL    NRBC 0.0 0  WBC    ABSOLUTE NRBC 0.00 0.00 - 9.25 K/uL   METABOLIC PANEL, COMPREHENSIVE    Collection Time: 12/26/21  3:15 AM   Result Value Ref Range    Sodium 126 (L) 136 - 145 mmol/L    Potassium 3.6 3.5 - 5.1 mmol/L    Chloride 95 (L) 97 - 108 mmol/L    CO2 23 21 - 32 mmol/L    Anion gap 8 5 - 15 mmol/L    Glucose 125 (H) 65 - 100 mg/dL    BUN 17 6 - 20 MG/DL    Creatinine 1.12 (H) 0.55 - 1.02 MG/DL    BUN/Creatinine ratio 15 12 - 20      GFR est AA 57 (L) >60 ml/min/1.73m2    GFR est non-AA 47 (L) >60 ml/min/1.73m2    Calcium 9.1 8.5 - 10.1 MG/DL    Bilirubin, total 0.6 0.2 - 1.0 MG/DL    ALT (SGPT) 20 12 - 78 U/L    AST (SGOT) 16 15 - 37 U/L    Alk.  phosphatase 74 45 - 117 U/L    Protein, total 6.7 6.4 - 8.2 g/dL    Albumin 3.0 (L) 3.5 - 5.0 g/dL    Globulin 3.7 2.0 - 4.0 g/dL    A-G Ratio 0.8 (L) 1.1 - 2.2

## 2021-12-26 NOTE — PROGRESS NOTES
[facility]   [address] [facility], [address] [place] [address]  Phone: [phone]   [phone]    [email or web address]     Nephrology Progress Note    Patient Name : Austen Moy      : 1940     MRN : [id number]  Date of Admission : 2021  Date of Servive : 21    CC: Follow up for Hyponatremia       Assessment and Plan   Acute Hyponatremia :  - 2/2 combination of HCTZ and aldactone  - Urine chemistries consistent w/ above picture   - Symptomatic and needed 3% saline   - continue NS at 75 cc/ hr   - Labs q6 hr   - hold florinef (d/w cardiology)     Supine HTN   Orthostatic Hypotension :  - Renal duplex : No LETICIA  - Hypokalemia : 2/2 Thiazide use and not suspecting hyperaldo state  - mx per cards      Lumbar spinal stenosis      Hypothyroidism      Mild CKD : based on imaging      Interval History:  Seen and examined. She is currently getting carotid Doppler ultrasound. Son is at bedside and discussed renal issues. Sodium corrected nicely with 3% saline. She reports dizziness symptoms this morning. Review of Systems: A comprehensive review of systems was negative except for that written in the HPI.     Current Medications:   Current Facility-Administered Medications   Medication Dose Route Frequency    0.9% sodium chloride infusion  75 mL/hr IntraVENous CONTINUOUS    [Held by provider] fludrocortisone (FLORINEF) tablet 0.1 mg  0.1 mg Oral DAILY    methocarbamoL (ROBAXIN) injection 500 mg  500 mg IntraVENous Q8H PRN    amiodarone (CORDARONE) tablet 200 mg  200 mg Oral DAILY    [Held by provider] carvediloL (COREG) tablet 25 mg  25 mg Oral BID WITH MEALS    cyclobenzaprine (FLEXERIL) tablet 5 mg  5 mg Oral TID PRN    cyclobenzaprine (FLEXERIL) tablet 10 mg  10 mg Oral TID PRN    morphine injection 1 mg  1 mg IntraVENous Q3H PRN    [Held by provider] losartan (COZAAR) tablet 100 mg  100 mg Oral QPM    apixaban (ELIQUIS) tablet 5 mg  5 mg Oral BID    levothyroxine (SYNTHROID) tablet 75 mcg  75 mcg Oral 6am    nitroglycerin (NITROSTAT) tablet 0.4 mg  0.4 mg SubLINGual PRN    atorvastatin (LIPITOR) tablet 40 mg  40 mg Oral QHS    pantoprazole (PROTONIX) tablet 40 mg  40 mg Oral ACB&D    traMADoL (ULTRAM) tablet 50 mg  50 mg Oral Q4H PRN    sodium chloride (NS) flush 5-40 mL  5-40 mL IntraVENous Q8H    sodium chloride (NS) flush 5-40 mL  5-40 mL IntraVENous PRN    acetaminophen (TYLENOL) tablet 650 mg  650 mg Oral Q6H PRN    Or    acetaminophen (TYLENOL) suppository 650 mg  650 mg Rectal Q6H PRN    polyethylene glycol (MIRALAX) packet 17 g  17 g Oral DAILY PRN    ondansetron (ZOFRAN ODT) tablet 4 mg  4 mg Oral Q8H PRN    Or    ondansetron (ZOFRAN) injection 4 mg  4 mg IntraVENous Q4H PRN      No Known Allergies    Objective:  Vitals:    Vitals:    12/26/21 0307 12/26/21 0400 12/26/21 0600 12/26/21 0738   BP: 122/70   (!) 167/75   Pulse: (!) 56 61 (!) 49 (!) 54   Resp: 16   17   Temp: 97.8 °F (36.6 °C)   97.7 °F (36.5 °C)   SpO2: 98%   93%   Weight: 93.2 kg (205 lb 7.5 oz)      Height:         Intake and Output:  No intake/output data recorded. 12/24 1901 - 12/26 0700  In: 240 [P.O.:240]  Out: -     Physical Examination:  General: NAD,Conversant   Neck:  Supple, no mass  Resp:  Lungs CTA B/L, no wheezing , normal respiratory effort  CV:  RRR,  no murmur or rub, LE edema  GI:  Soft, NT, + BS, no HS megaly  Neurologic:  Non focal  Psych:             AAO x 3 appropriate affect       []    High complexity decision making was performed  []    Patient is at high-risk of decompensation with multiple organ involvement    Lab Data Personally Reviewed: I have reviewed all the pertinent labs, microbiology data and radiology studies during assessment.     Recent Labs     12/26/21  0315 12/25/21  2116 12/25/21  1212 12/25/21  1211   * 125* 120* PLEASE DISREGARD RESULTS   K 3.6  --  3.9  --    CL 95*  --  89*  --    CO2 23  --  24  --    *  -- 107*  --    BUN 17  --  17  --    CREA 1.12*  --  1.18*  --    CA 9.1  --  8.9  --    MG  --   --  1.9  --    ALB 3.0*  --   --   --    ALT 20  --   --   --      Recent Labs     12/26/21  0315 12/25/21  1212   WBC 8.3 8.9   HGB 12.6 12.1   HCT 36.9 36.0    314     No results found for: SDES  No results found for: CULT  Recent Results (from the past 24 hour(s))   GLUCOSE, POC    Collection Time: 12/25/21 12:00 PM   Result Value Ref Range    Glucose (POC) 104 65 - 117 mg/dL    Performed by Yessy Palacios    SODIUM    Collection Time: 12/25/21 12:11 PM   Result Value Ref Range    Sodium PLEASE DISREGARD RESULTS 136 - 145 mmol/L   SAMPLES BEING HELD    Collection Time: 12/25/21 12:12 PM   Result Value Ref Range    SAMPLES BEING HELD 1 SST, 1 DARREN,1UC     COMMENT        Add-on orders for these samples will be processed based on acceptable specimen integrity and analyte stability, which may vary by analyte.    CBC W/O DIFF    Collection Time: 12/25/21 12:12 PM   Result Value Ref Range    WBC 8.9 3.6 - 11.0 K/uL    RBC 3.95 3.80 - 5.20 M/uL    HGB 12.1 11.5 - 16.0 g/dL    HCT 36.0 35.0 - 47.0 %    MCV 91.1 80.0 - 99.0 FL    MCH 30.6 26.0 - 34.0 PG    MCHC 33.6 30.0 - 36.5 g/dL    RDW 12.4 11.5 - 14.5 %    PLATELET 578 982 - 383 K/uL    MPV 8.3 (L) 8.9 - 12.9 FL    NRBC 0.0 0  WBC    ABSOLUTE NRBC 0.00 0.00 - 0.01 K/uL   LACTIC ACID    Collection Time: 12/25/21 12:12 PM   Result Value Ref Range    Lactic acid 1.7 0.4 - 2.0 MMOL/L   TROPONIN-HIGH SENSITIVITY    Collection Time: 12/25/21 12:12 PM   Result Value Ref Range    Troponin-High Sensitivity PLEASE DISREGARD RESULTS 0 - 51 ng/L   METABOLIC PANEL, BASIC    Collection Time: 12/25/21 12:12 PM   Result Value Ref Range    Sodium 120 (L) 136 - 145 mmol/L    Potassium 3.9 3.5 - 5.1 mmol/L    Chloride 89 (L) 97 - 108 mmol/L    CO2 24 21 - 32 mmol/L    Anion gap 7 5 - 15 mmol/L    Glucose 107 (H) 65 - 100 mg/dL    BUN 17 6 - 20 MG/DL    Creatinine 1.18 (H) 0.55 - 1.02 MG/DL    BUN/Creatinine ratio 14 12 - 20      GFR est AA 54 (L) >60 ml/min/1.73m2    GFR est non-AA 44 (L) >60 ml/min/1.73m2    Calcium 8.9 8.5 - 10.1 MG/DL   MAGNESIUM    Collection Time: 12/25/21 12:12 PM   Result Value Ref Range    Magnesium 1.9 1.6 - 2.4 mg/dL   TSH 3RD GENERATION    Collection Time: 12/25/21 12:12 PM   Result Value Ref Range    TSH 0.94 0.36 - 3.74 uIU/mL   VITAMIN B12    Collection Time: 12/25/21 12:12 PM   Result Value Ref Range    Vitamin B12 671 193 - 986 pg/mL   CORTISOL, AM    Collection Time: 12/25/21 12:12 PM   Result Value Ref Range    Cortisol, a.m. 10.5 4.30 - 22.45 ug/dL   TSH 3RD GENERATION    Collection Time: 12/25/21 12:12 PM   Result Value Ref Range    TSH 0.93 0.36 - 3.74 uIU/mL   TROPONIN-HIGH SENSITIVITY    Collection Time: 12/25/21 12:12 PM   Result Value Ref Range    Troponin-High Sensitivity 119 (HH) 0 - 51 ng/L   POC VENOUS BLOOD GAS    Collection Time: 12/25/21  1:22 PM   Result Value Ref Range    Device: NASAL CANNULA      pH, venous (POC) 7.38 7.32 - 7.42      pCO2, venous (POC) 41.2 41 - 51 MMHG    pO2, venous (POC) 28 25 - 40 mmHg    HCO3, venous (POC) 24.3 23.0 - 28.0 MMOL/L    sO2, venous (POC) 50.2 (L) 65 - 88 %    Base deficit, venous (POC) 0.9 mmol/L    Allens test (POC) Negative      Site RIGHT RADIAL      Specimen type (POC) VENOUS BLOOD      Performed by Kali BROWN    SODIUM, UR, RANDOM    Collection Time: 12/25/21  5:04 PM   Result Value Ref Range    Sodium,urine random 24 MMOL/L   OSMOLALITY, UR    Collection Time: 12/25/21  5:04 PM   Result Value Ref Range    Osmolality,urine 146 MOSM/kg H2O   POTASSIUM, UR, RANDOM    Collection Time: 12/25/21  5:04 PM   Result Value Ref Range    Potassium urine, random 5 MMOL/L   URINALYSIS W/MICROSCOPIC    Collection Time: 12/25/21  5:04 PM   Result Value Ref Range    Color YELLOW/STRAW      Appearance CLEAR CLEAR      Specific gravity 1.016 1.003 - 1.030      pH (UA) 7.0 5.0 - 8.0      Protein Negative NEG mg/dL Glucose Negative NEG mg/dL    Ketone Negative NEG mg/dL    Bilirubin Negative NEG      Blood Negative NEG      Urobilinogen 0.2 0.2 - 1.0 EU/dL    Nitrites Negative NEG      Leukocyte Esterase Negative NEG      WBC 0-4 0 - 4 /hpf    RBC 0-5 0 - 5 /hpf    Epithelial cells FEW FEW /lpf    Bacteria Negative NEG /hpf   SODIUM    Collection Time: 12/25/21  9:16 PM   Result Value Ref Range    Sodium 125 (L) 136 - 145 mmol/L   CBC W/O DIFF    Collection Time: 12/26/21  3:15 AM   Result Value Ref Range    WBC 8.3 3.6 - 11.0 K/uL    RBC 4.07 3.80 - 5.20 M/uL    HGB 12.6 11.5 - 16.0 g/dL    HCT 36.9 35.0 - 47.0 %    MCV 90.7 80.0 - 99.0 FL    MCH 31.0 26.0 - 34.0 PG    MCHC 34.1 30.0 - 36.5 g/dL    RDW 12.8 11.5 - 14.5 %    PLATELET 588 769 - 841 K/uL    MPV 8.5 (L) 8.9 - 12.9 FL    NRBC 0.0 0  WBC    ABSOLUTE NRBC 0.00 0.00 - 6.13 K/uL   METABOLIC PANEL, COMPREHENSIVE    Collection Time: 12/26/21  3:15 AM   Result Value Ref Range    Sodium 126 (L) 136 - 145 mmol/L    Potassium 3.6 3.5 - 5.1 mmol/L    Chloride 95 (L) 97 - 108 mmol/L    CO2 23 21 - 32 mmol/L    Anion gap 8 5 - 15 mmol/L    Glucose 125 (H) 65 - 100 mg/dL    BUN 17 6 - 20 MG/DL    Creatinine 1.12 (H) 0.55 - 1.02 MG/DL    BUN/Creatinine ratio 15 12 - 20      GFR est AA 57 (L) >60 ml/min/1.73m2    GFR est non-AA 47 (L) >60 ml/min/1.73m2    Calcium 9.1 8.5 - 10.1 MG/DL    Bilirubin, total 0.6 0.2 - 1.0 MG/DL    ALT (SGPT) 20 12 - 78 U/L    AST (SGOT) 16 15 - 37 U/L    Alk.  phosphatase 74 45 - 117 U/L    Protein, total 6.7 6.4 - 8.2 g/dL    Albumin 3.0 (L) 3.5 - 5.0 g/dL    Globulin 3.7 2.0 - 4.0 g/dL    A-G Ratio 0.8 (L) 1.1 - 2.2     DUPLEX CAROTID BILATERAL    Collection Time: 12/26/21  9:13 AM   Result Value Ref Range    Right CCA prox sys 66.4 cm/s    Right CCA prox mcwilliams 15.5 cm/s    Right cca dist sys 59.8 cm/s    Right CCA dist mcwilliams 14.2 cm/s    Right eca sys 55.9 cm/s    RIGHT EXTERNAL CAROTID ARTERY D 7.70 cm/s    Right ICA prox sys 42.9 cm/s Right ICA prox mcwilliams 10.3 cm/s    Right ICA mid sys 59.3 cm/s    Right ICA mid mcwilliams 17.4 cm/s    Right ICA dist sys 99.5 cm/s    Right ICA dist mcwilliams 24.3 cm/s    Right vertebral sys 44.3 cm/s    RIGHT VERTEBRAL ARTERY D 10.30 cm/s    Right ICA/CCA sys 1.7     Left CCA prox sys 89.5 cm/s    Left CCA prox mcwilliams 14.6 cm/s    Left CCA dist sys 53.9 cm/s    Left CCA dist mcwilliams 10.9 cm/s    Left ECA sys 62.5 cm/s    LEFT EXTERNAL CAROTID ARTERY D 7.20 cm/s    Left ICA prox sys 50.2 cm/s    Left ICA prox mcwilliams 13.4 cm/s    Left ICA mid sys 76.0 cm/s    Left ICA mid mcwilliams 23.2 cm/s    Left ICA dist sys 52.7 cm/s    Left ICA dist mcwilliams 18.5 cm/s    Left vertebral sys 44.3 cm/s    LEFT VERTEBRAL ARTERY D 10.30 cm/s    Left ICA/CCA sys 1.40            I have reviewed the flowsheets. Chart and Pertinent Notes have been reviewed. No change in PMH ,family and social history from Consult note.       Todd Schmidt [facility]

## 2021-12-26 NOTE — PROGRESS NOTES
[facility]                                                                                          Hospitalist Progress Note  Karyn Ramirez MD  Answering service: [phone] from in house phone        Date of Service:  2021  NAME:  Juice Strickland  :  1940  MRN:  [id number]      Admission Summary:   69y/o female with pmh of HLD, HTN who presented with recurrent chest pain in the context of HTN emergency. [facility]ateint [facility] with BPs that were elevated and associated with chest pain. Her first visit, she was started on additional BP medications, and discharged after having neg enzymes, and neg EKG. She represented with additionally elevated BPs; and required admission. Interval history / Subjective:   Pt earliar seen, , denies chest pain headache,dizziness  Pt with Labile BP control ,noted with orthostatic hypotension and hyponatremia     Assessment & Plan:      Unresponsiveness/LOC   -RR code stroke called  -CTA showed no occlusion or stenosis, showed Large right MCA perfusion mismatch   - pt had acute drop of BP from accelerated HTN,  and hyponatremia   - possible CVA/Seizure   -MRI   - EEG   -neuro consult,appreciated    Hyponatremia  -acute on chronic ,symptomatic with possible seizure  -,Na dropped to 120  -Renal consult appreciated,s/p 3% saline x 4 hrs,stopped aldacton  -Na improved,now on NS, Na q 6 hrs    Orthostatic hypotension  -card/renal f/u  - midordrine d/kenia florinef started per card    CARRILLO  -prerenal likely due to hypotension  -ivf     Chest pain  - Stress test negative  - Troponins negative  - EKG non focal   - Cardiology following        Initial presentation HTN emergency - with chest pain   Now  with Orthostatic hypotension   -coreg and cozar held  -monitor BP   - Cardiology adjusting meds     A. fib with RVR   amiodarone to 200 mg daily.   Already on Eliquis      GERD - start PPI   Hypothyroidism - levothyroxine  Mild hyponatremia     Code status: FULL  DVT prophylaxis: Eliquis     Care Plan discussed with: Patient/Family  Anticipated Disposition: Home w/Family  Anticipated Discharge:more than 48 hours      Hospital Problems  Date Reviewed: 12/20/2021          Codes Class Noted POA    Hypertensive emergency ICD-10-CM: I16.1  ICD-9-CM: 401.9  12/20/2021 Unknown        Palpitations ICD-10-CM: R00.2  ICD-9-CM: 785.1  12/19/2021 Unknown        Chest pain ICD-10-CM: R07.9  ICD-9-CM: 786.50  12/19/2021 Unknown                Review of Systems:   A comprehensive review of systems was negative except for that written in the HPI. Vital Signs:    Last 24hrs VS reviewed since prior progress note. Most recent are:  Visit Vitals  /67 (BP 1 Location: Right upper arm, BP Patient Position: Sitting)   Pulse 65   Temp 97.7 °F (36.5 °C)   Resp 16   Ht 5' 8\" (1.727 m)   Wt 93.2 kg (205 lb 7.5 oz)   SpO2 100%   BMI 31.24 kg/m²       No intake or output data in the 24 hours ending 12/26/21 1859     Physical Examination:     I had a face to face encounter with this patient and independently examined them on 12/26/2021 as outlined below:          Constitutional:  No acute pr does not remember what happened    ENT:  Oral mucosa moist, oropharynx benign. Resp:  CTA bilaterally. No wheezing/rhonchi/rales. No accessory muscle use   CV:  Regular rhythm, normal rate, no murmurs, gallops, rubs    GI:  Soft, non distended, non tender. normoactive bowel sounds, no hepatosplenomegaly     Musculoskeletal:  No edema, warm, 2+ pulses throughout     Neurologic:  Moves all extremities.   Awake,alert, repeating sentences   CN II-XII intact, no focal deficit            Data Review:    Review and/or order of clinical lab test  Review and/or order of tests in the radiology section of CPT  Review and/or order of tests in the medicine section of CPT      Labs:     Recent Labs     12/26/21  0315 12/25/21  1212   WBC 8.3 8.9   HGB 12.6 12.1 HCT 36.9 36.0    314     Recent Labs     12/26/21  1531 12/26/21  0930 12/26/21  0315 12/25/21  2116 12/25/21  1212   * 126* 126*   < > 120*   K  --   --  3.6  --  3.9   CL  --   --  95*  --  89*   CO2  --   --  23  --  24   BUN  --   --  17  --  17   CREA  --   --  1.12*  --  1.18*   GLU  --   --  125*  --  107*   CA  --   --  9.1  --  8.9   MG  --   --   --   --  1.9    < > = values in this interval not displayed. Recent Labs     12/26/21 0315   ALT 20   AP 74   TBILI 0.6   TP 6.7   ALB 3.0*   GLOB 3.7     No results for input(s): INR, PTP, APTT, INREXT, INREXT in the last 72 hours. No results for input(s): FE, TIBC, PSAT, FERR in the last 72 hours. No results found for: FOL, RBCF   No results for input(s): PH, PCO2, PO2 in the last 72 hours. No results for input(s): CPK, CKNDX, TROIQ in the last 72 hours.     No lab exists for component: CPKMB  No results found for: CHOL, CHOLX, CHLST, CHOLV, HDL, HDLP, LDL, LDLC, DLDLP, TGLX, TRIGL, TRIGP, CHHD, CHHDX  Lab Results   Component Value Date/Time    Glucose (POC) 104 12/25/2021 12:00 PM     Lab Results   Component Value Date/Time    Color YELLOW/STRAW 12/25/2021 05:04 PM    Appearance CLEAR 12/25/2021 05:04 PM    Specific gravity 1.016 12/25/2021 05:04 PM    pH (UA) 7.0 12/25/2021 05:04 PM    Protein Negative 12/25/2021 05:04 PM    Glucose Negative 12/25/2021 05:04 PM    Ketone Negative 12/25/2021 05:04 PM    Bilirubin Negative 12/25/2021 05:04 PM    Urobilinogen 0.2 12/25/2021 05:04 PM    Nitrites Negative 12/25/2021 05:04 PM    Leukocyte Esterase Negative 12/25/2021 05:04 PM    Epithelial cells FEW 12/25/2021 05:04 PM    Bacteria Negative 12/25/2021 05:04 PM    WBC 0-4 12/25/2021 05:04 PM    RBC 0-5 12/25/2021 05:04 PM         Medications Reviewed:     Current Facility-Administered Medications   Medication Dose Route Frequency    0.9% sodium chloride infusion  75 mL/hr IntraVENous CONTINUOUS    [Held by provider] fludrocortisone (FLORINEF) tablet 0.1 mg  0.1 mg Oral DAILY    methocarbamoL (ROBAXIN) injection 500 mg  500 mg IntraVENous Q8H PRN    amiodarone (CORDARONE) tablet 200 mg  200 mg Oral DAILY    [Held by provider] carvediloL (COREG) tablet 25 mg  25 mg Oral BID WITH MEALS    cyclobenzaprine (FLEXERIL) tablet 5 mg  5 mg Oral TID PRN    cyclobenzaprine (FLEXERIL) tablet 10 mg  10 mg Oral TID PRN    morphine injection 1 mg  1 mg IntraVENous Q3H PRN    [Held by provider] losartan (COZAAR) tablet 100 mg  100 mg Oral QPM    apixaban (ELIQUIS) tablet 5 mg  5 mg Oral BID    levothyroxine (SYNTHROID) tablet 75 mcg  75 mcg Oral 6am    nitroglycerin (NITROSTAT) tablet 0.4 mg  0.4 mg SubLINGual PRN    atorvastatin (LIPITOR) tablet 40 mg  40 mg Oral QHS    pantoprazole (PROTONIX) tablet 40 mg  40 mg Oral ACB&D    traMADoL (ULTRAM) tablet 50 mg  50 mg Oral Q4H PRN    sodium chloride (NS) flush 5-40 mL  5-40 mL IntraVENous Q8H    sodium chloride (NS) flush 5-40 mL  5-40 mL IntraVENous PRN    acetaminophen (TYLENOL) tablet 650 mg  650 mg Oral Q6H PRN    Or    acetaminophen (TYLENOL) suppository 650 mg  650 mg Rectal Q6H PRN    polyethylene glycol (MIRALAX) packet 17 g  17 g Oral DAILY PRN    ondansetron (ZOFRAN ODT) tablet 4 mg  4 mg Oral Q8H PRN    Or    ondansetron (ZOFRAN) injection 4 mg  4 mg IntraVENous Q4H PRN     ______________________________________________________________________  EXPECTED LENGTH OF STAY: 2d 4h  ACTUAL LENGTH OF STAY:          5                 Corinne Lorenz MD

## 2021-12-27 ENCOUNTER — APPOINTMENT (OUTPATIENT)
Dept: MRI IMAGING | Age: 81
DRG: 305 | End: 2021-12-27
Attending: FAMILY MEDICINE
Payer: MEDICARE

## 2021-12-27 LAB
ALBUMIN SERPL-MCNC: 3.4 G/DL (ref 3.5–5)
ANION GAP SERPL CALC-SCNC: 5 MMOL/L (ref 5–15)
ANION GAP SERPL CALC-SCNC: 6 MMOL/L (ref 5–15)
BUN SERPL-MCNC: 15 MG/DL (ref 6–20)
BUN SERPL-MCNC: 16 MG/DL (ref 6–20)
BUN/CREAT SERPL: 15 (ref 12–20)
BUN/CREAT SERPL: 15 (ref 12–20)
CALCIUM SERPL-MCNC: 8.6 MG/DL (ref 8.5–10.1)
CALCIUM SERPL-MCNC: 9 MG/DL (ref 8.5–10.1)
CHLORIDE SERPL-SCNC: 100 MMOL/L (ref 97–108)
CHLORIDE SERPL-SCNC: 100 MMOL/L (ref 97–108)
CO2 SERPL-SCNC: 24 MMOL/L (ref 21–32)
CO2 SERPL-SCNC: 25 MMOL/L (ref 21–32)
CREAT SERPL-MCNC: 1.01 MG/DL (ref 0.55–1.02)
CREAT SERPL-MCNC: 1.05 MG/DL (ref 0.55–1.02)
GLUCOSE SERPL-MCNC: 103 MG/DL (ref 65–100)
GLUCOSE SERPL-MCNC: 137 MG/DL (ref 65–100)
LEFT CCA DIST DIAS: 10.9 CM/S
LEFT CCA DIST SYS: 53.9 CM/S
LEFT CCA PROX DIAS: 14.6 CM/S
LEFT CCA PROX SYS: 89.5 CM/S
LEFT ECA DIAS: 7.2 CM/S
LEFT ECA SYS: 62.5 CM/S
LEFT ICA DIST DIAS: 18.5 CM/S
LEFT ICA DIST SYS: 52.7 CM/S
LEFT ICA MID DIAS: 23.2 CM/S
LEFT ICA MID SYS: 76 CM/S
LEFT ICA PROX DIAS: 13.4 CM/S
LEFT ICA PROX SYS: 50.2 CM/S
LEFT ICA/CCA SYS: 1.4
LEFT VERTEBRAL DIAS: 10.3 CM/S
LEFT VERTEBRAL SYS: 44.3 CM/S
PHOSPHATE SERPL-MCNC: 2.7 MG/DL (ref 2.6–4.7)
POTASSIUM SERPL-SCNC: 3.8 MMOL/L (ref 3.5–5.1)
POTASSIUM SERPL-SCNC: 4.2 MMOL/L (ref 3.5–5.1)
RIGHT CCA DIST DIAS: 14.2 CM/S
RIGHT CCA DIST SYS: 59.8 CM/S
RIGHT CCA PROX DIAS: 15.5 CM/S
RIGHT CCA PROX SYS: 66.4 CM/S
RIGHT ECA DIAS: 7.7 CM/S
RIGHT ECA SYS: 55.9 CM/S
RIGHT ICA DIST DIAS: 24.3 CM/S
RIGHT ICA DIST SYS: 99.5 CM/S
RIGHT ICA MID DIAS: 17.4 CM/S
RIGHT ICA MID SYS: 59.3 CM/S
RIGHT ICA PROX DIAS: 10.3 CM/S
RIGHT ICA PROX SYS: 42.9 CM/S
RIGHT ICA/CCA SYS: 1.7
RIGHT VERTEBRAL DIAS: 10.3 CM/S
RIGHT VERTEBRAL SYS: 44.3 CM/S
SODIUM SERPL-SCNC: 130 MMOL/L (ref 136–145)
SODIUM SERPL-SCNC: 130 MMOL/L (ref 136–145)

## 2021-12-27 PROCEDURE — 65660000000 HC RM CCU STEPDOWN

## 2021-12-27 PROCEDURE — 74011250637 HC RX REV CODE- 250/637: Performed by: INTERNAL MEDICINE

## 2021-12-27 PROCEDURE — 80069 RENAL FUNCTION PANEL: CPT

## 2021-12-27 PROCEDURE — 70551 MRI BRAIN STEM W/O DYE: CPT

## 2021-12-27 PROCEDURE — 97116 GAIT TRAINING THERAPY: CPT

## 2021-12-27 PROCEDURE — 74011250637 HC RX REV CODE- 250/637: Performed by: FAMILY MEDICINE

## 2021-12-27 PROCEDURE — 36415 COLL VENOUS BLD VENIPUNCTURE: CPT

## 2021-12-27 PROCEDURE — 80048 BASIC METABOLIC PNL TOTAL CA: CPT

## 2021-12-27 PROCEDURE — 74011250637 HC RX REV CODE- 250/637: Performed by: STUDENT IN AN ORGANIZED HEALTH CARE EDUCATION/TRAINING PROGRAM

## 2021-12-27 RX ORDER — VERAPAMIL HYDROCHLORIDE 80 MG/1
40 TABLET ORAL 2 TIMES DAILY
Status: DISCONTINUED | OUTPATIENT
Start: 2021-12-28 | End: 2021-12-28

## 2021-12-27 RX ADMIN — APIXABAN 5 MG: 5 TABLET, FILM COATED ORAL at 21:34

## 2021-12-27 RX ADMIN — TRAMADOL HYDROCHLORIDE 50 MG: 50 TABLET, COATED ORAL at 21:34

## 2021-12-27 RX ADMIN — PANTOPRAZOLE SODIUM 40 MG: 40 TABLET, DELAYED RELEASE ORAL at 16:42

## 2021-12-27 RX ADMIN — APIXABAN 5 MG: 5 TABLET, FILM COATED ORAL at 08:52

## 2021-12-27 RX ADMIN — AMIODARONE HYDROCHLORIDE 200 MG: 200 TABLET ORAL at 08:53

## 2021-12-27 RX ADMIN — LEVOTHYROXINE SODIUM 75 MCG: 0.07 TABLET ORAL at 08:52

## 2021-12-27 RX ADMIN — ATORVASTATIN CALCIUM 40 MG: 40 TABLET, FILM COATED ORAL at 21:34

## 2021-12-27 RX ADMIN — TRAMADOL HYDROCHLORIDE 50 MG: 50 TABLET, COATED ORAL at 02:12

## 2021-12-27 RX ADMIN — Medication 10 ML: at 14:57

## 2021-12-27 RX ADMIN — Medication 10 ML: at 21:35

## 2021-12-27 RX ADMIN — PANTOPRAZOLE SODIUM 40 MG: 40 TABLET, DELAYED RELEASE ORAL at 08:53

## 2021-12-27 RX ADMIN — Medication 10 ML: at 06:00

## 2021-12-27 RX ADMIN — CYCLOBENZAPRINE 10 MG: 10 TABLET, FILM COATED ORAL at 08:52

## 2021-12-27 NOTE — PROGRESS NOTES
[id number]  Hospitalist Group                                                                                          Hospitalist Progress Note  Agnes Whalen MD  Answering service: [phone] from in house phone        Date of Service:  2021  NAME:  Thu Greenwood  :  1940  MRN:  [id number]      Admission Summary:   71y/o female with pmh of HLD, HTN who presented with recurrent chest pain in the context of HTN emergency. [place]int [place] [facility] with BPs that were elevated and associated with chest pain. Her first visit, she was started on additional BP medications, and discharged after having neg enzymes, and neg EKG. She represented with additionally elevated BPs; and required admission. Interval history / Subjective:      Seen and examined. Events noted. Discussed with patient and her son. Reviewed the [facility] with them. Assessment & Plan:      Unresponsiveness/LOC ,likley orthostatic. BP was 83/38  -RRT and then code stroke was activated. -CTH negative. CTA showed no occlusion or stenosis. CTP large right MCA perfusion mismatch    -Neurology evaluated,agreed for MRI brain,pending     Hyponatremia,stable 130s,likely due to diuretics,HCTZ nd aldactone   -acute on chronic ,doubt seizure from the history I could gather   -Renal consult appreciated,s/p 3% saline x 4 hrs. HCTZ and aldactone stopped. Hypertension,initially presented with hypertension ememrgency. Orthostatic hypotension and LOC on   - She was on lisinopril 20 mg and lopressor PTA.   --She was getting aldactone 25 mg - and 50 mg  and ,;coreg 25 mg bid on  and 12.5 mg bid till ;HCTZ -;losartan  100 mg - and 50 mg on   --Planned to restart low dose coreg and discussed with cardiologist Dr Bryan Vinson who suggested to watch off BP meds  accepting higher supine hypertension due to dangers of fall and complications as she is on blood thinners. Primary cardiologist Dr Beatrice Joyce will see her here       CARRILLO  -prerenal likely due to hypotension  -improved     Chest pain  - Stress test negative  - Troponins negative  - EKG non focal   - Cardiology following     A. fib with RVR   amiodarone to 200 mg daily. Already on Eliquis      GERD - start PPI     Hypothyroidism - levothyroxine        Code status: FULL  DVT prophylaxis: Eliquis     Care Plan discussed with: Patient/Family  Anticipated Disposition: Home w/Family  Anticipated [facility] brain MRI     Hospital Problems  Date Reviewed: 12/20/2021          Codes Class Noted POA    Hypertensive emergency ICD-10-CM: I16.1  ICD-9-CM: 401.9  12/20/2021 Unknown        Palpitations ICD-10-CM: R00.2  ICD-9-CM: 785.1  12/19/2021 Unknown        Chest pain ICD-10-CM: R07.9  ICD-9-CM: 786.50  12/19/2021 Unknown                Review of Systems:   A comprehensive review of systems was negative except for that written in the HPI. Vital Signs:    Last 24hrs VS reviewed since prior progress note. Most recent are:  Visit Vitals  BP (!) 150/82 (BP 1 Location: Right arm, BP Patient Position: Sitting)   Pulse 62   Temp 97.6 °F (36.4 °C)   Resp 18   Ht 5' 8\" (1.727 m)   Wt 94.4 kg (208 lb 1.8 oz)   SpO2 98%   BMI 31.64 kg/m²         Intake/Output Summary (Last 24 hours) at 12/27/2021 1258  Last data filed at 12/27/2021 1145  Gross per 24 hour   Intake    Output 200 ml   Net -200 ml        Physical Examination:     I had a face to face encounter with this patient and independently examined them on 12/27/2021 as outlined below:          Constitutional:  No acute pr does not remember what happened    ENT:  Oral mucosa moist, oropharynx benign. Resp:  CTA bilaterally. No wheezing/rhonchi/rales. No accessory muscle use   CV:  Regular rhythm, normal rate, no murmurs, gallops, rubs    GI:  Soft, non distended, non tender.  normoactive bowel sounds, no hepatosplenomegaly     Musculoskeletal:  No edema, warm, 2+ pulses throughout     Neurologic:  Moves all extremities. Awake,alert, repeating sentences   CN II-XII intact, no focal deficit            Data Review:    Review and/or order of clinical lab test  Review and/or order of tests in the radiology section of [facility]  Review and/or order of tests in the medicine section of [facility]      Labs:     Recent Labs     12/26/21  0315 12/25/21  1212   WBC 8.3 8.9   HGB 12.6 12.1   HCT 36.9 37.0  36.0    314     Recent Labs     12/27/21  0356 12/26/21  2143 12/26/21  1531 12/26/21  0930 12/26/21  0315 12/25/21  2116 12/25/21  1212   * 129* 128*   < > 126*   < > 120*   K 3.8  --   --   --  3.6  --  3.9     --   --   --  95*  --  89*   CO2 25  --   --   --  23  --  24   BUN 16  --   --   --  17  --  17   CREA 1.05*  --   --   --  1.12*  --  1.18*   *  --   --   --  125*  --  107*   CA 8.6  --   --   --  9.1  --  8.9   MG  --   --   --   --   --   --  1.9    < > = values in this interval not displayed. Recent Labs     12/26/21 0315   ALT 20   AP 74   TBILI 0.6   TP 6.7   ALB 3.0*   GLOB 3.7     No results for input(s): INR, PTP, APTT, INREXT, INREXT in the last 72 hours. No results for input(s): FE, TIBC, PSAT, FERR in the last 72 hours. No results found for: FOL, RBCF   No results for input(s): PH, PCO2, PO2 in the last 72 hours. No results for input(s): CPK, CKNDX, TROIQ in the last 72 hours.     No lab exists for component: CPKMB  No results found for: CHOL, CHOLX, CHLST, CHOLV, HDL, HDLP, LDL, LDLC, DLDLP, TGLX, TRIGL, TRIGP, CHHD, CHHDX  Lab Results   Component Value Date/Time    Glucose (POC) 104 12/25/2021 12:00 PM     Lab Results   Component Value Date/Time    Color YELLOW/STRAW 12/25/2021 05:04 PM    Appearance CLEAR 12/25/2021 05:04 PM    Specific gravity 1.016 12/25/2021 05:04 PM    pH (UA) 7.0 12/25/2021 05:04 PM    Protein Negative 12/25/2021 05:04 PM    Glucose Negative 12/25/2021 05:04 PM    Ketone Negative 12/25/2021 05:04 PM    Bilirubin Negative 12/25/2021 05:04 PM    Urobilinogen 0.2 12/25/2021 05:04 PM    Nitrites Negative 12/25/2021 05:04 PM    Leukocyte Esterase Negative 12/25/2021 05:04 PM    Epithelial cells FEW 12/25/2021 05:04 PM    Bacteria Negative 12/25/2021 05:04 PM    WBC 0-4 12/25/2021 05:04 PM    RBC 0-5 12/25/2021 05:04 PM         Medications Reviewed:     Current Facility-Administered Medications   Medication Dose Route Frequency    [Held by provider] fludrocortisone (FLORINEF) tablet 0.1 mg  0.1 mg Oral DAILY    methocarbamoL (ROBAXIN) injection 500 mg  500 mg IntraVENous Q8H PRN    amiodarone (CORDARONE) tablet 200 mg  200 mg Oral DAILY    [Held by provider] carvediloL (COREG) tablet 25 mg  25 mg Oral BID WITH MEALS    cyclobenzaprine (FLEXERIL) tablet 5 mg  5 mg Oral TID PRN    cyclobenzaprine (FLEXERIL) tablet 10 mg  10 mg Oral TID PRN    morphine injection 1 mg  1 mg IntraVENous Q3H PRN    [Held by provider] losartan (COZAAR) tablet 100 mg  100 mg Oral QPM    apixaban (ELIQUIS) tablet 5 mg  5 mg Oral BID    levothyroxine (SYNTHROID) tablet 75 mcg  75 mcg Oral 6am    nitroglycerin (NITROSTAT) tablet 0.4 mg  0.4 mg SubLINGual PRN    atorvastatin (LIPITOR) tablet 40 mg  40 mg Oral QHS    pantoprazole (PROTONIX) tablet 40 mg  40 mg Oral ACB&D    traMADoL (ULTRAM) tablet 50 mg  50 mg Oral Q4H PRN    sodium chloride (NS) flush 5-40 mL  5-40 mL IntraVENous Q8H    sodium chloride (NS) flush 5-40 mL  5-40 mL IntraVENous PRN    acetaminophen (TYLENOL) tablet 650 mg  650 mg Oral Q6H PRN    Or    acetaminophen (TYLENOL) suppository 650 mg  650 mg Rectal Q6H PRN    polyethylene glycol (MIRALAX) packet 17 g  17 g Oral DAILY PRN    ondansetron (ZOFRAN ODT) tablet 4 mg  4 mg Oral Q8H PRN    Or    ondansetron (ZOFRAN) injection 4 mg  4 mg IntraVENous Q4H PRN     ______________________________________________________________________  EXPECTED LENGTH OF STAY: 2d 4h  ACTUAL LENGTH OF STAY:          6                 Wondaya T Abhay Da Silva MD

## 2021-12-27 NOTE — PROGRESS NOTES
[facility]   [address] [facility], [id number] [place] [address]  Phone: [phone]   [phone]    [email or web address]     Nephrology Progress Note    Patient Name : Pritesh Bro      : 1940     MRN : [id number]  Date of Admission : 2021  Date of Servive : 21    CC: Follow up for Hyponatremia       Assessment and Plan   Acute Hyponatremia :  - 2/2 combination of HCTZ and aldactone  - Urine chemistries consistent w/ above picture   - Na stable  - d/c IVF and montior  - labs Q12 hrs for now     Supine HTN   Orthostatic Hypotension :  - Renal duplex : No LETICIA  - Hypokalemia : 2/2 Thiazide use and not suspecting hyperaldo state  - mx per cards      Lumbar spinal stenosis      Hypothyroidism      Mild CKD : based on imaging      Interval History:  Seen and examined. She is feeling better. Off IVF this AM.  Eating breakfast.  Na 130. No cp, sob, n/v/d reported    Review of Systems: A comprehensive review of systems was negative except for that written in the HPI.     Current Medications:   Current Facility-Administered Medications   Medication Dose Route Frequency    [Held by provider] fludrocortisone (FLORINEF) tablet 0.1 mg  0.1 mg Oral DAILY    methocarbamoL (ROBAXIN) injection 500 mg  500 mg IntraVENous Q8H PRN    amiodarone (CORDARONE) tablet 200 mg  200 mg Oral DAILY    [Held by provider] carvediloL (COREG) tablet 25 mg  25 mg Oral BID WITH MEALS    cyclobenzaprine (FLEXERIL) tablet 5 mg  5 mg Oral TID PRN    cyclobenzaprine (FLEXERIL) tablet 10 mg  10 mg Oral TID PRN    morphine injection 1 mg  1 mg IntraVENous Q3H PRN    [Held by provider] losartan (COZAAR) tablet 100 mg  100 mg Oral QPM    apixaban (ELIQUIS) tablet 5 mg  5 mg Oral BID    levothyroxine (SYNTHROID) tablet 75 mcg  75 mcg Oral 6am    nitroglycerin (NITROSTAT) tablet 0.4 mg  0.4 mg SubLINGual PRN    atorvastatin (LIPITOR) tablet 40 mg  40 mg Oral QHS    pantoprazole (PROTONIX) tablet 40 mg  40 mg Oral ACB&D    traMADoL (ULTRAM) tablet 50 mg  50 mg Oral Q4H PRN    sodium chloride (NS) flush 5-40 mL  5-40 mL IntraVENous Q8H    sodium chloride (NS) flush 5-40 mL  5-40 mL IntraVENous PRN    acetaminophen (TYLENOL) tablet 650 mg  650 mg Oral Q6H PRN    Or    acetaminophen (TYLENOL) suppository 650 mg  650 mg Rectal Q6H PRN    polyethylene glycol (MIRALAX) packet 17 g  17 g Oral DAILY PRN    ondansetron (ZOFRAN ODT) tablet 4 mg  4 mg Oral Q8H PRN    Or    ondansetron (ZOFRAN) injection 4 mg  4 mg IntraVENous Q4H PRN      No Known Allergies    Objective:  Vitals:    Vitals:    12/27/21 0300 12/27/21 0351 12/27/21 0552 12/27/21 1000   BP: (!) 189/81   (!) 167/86   Pulse: 67 78 62 67   Resp: 18      Temp: 97.5 °F (36.4 °C)   97.6 °F (36.4 °C)   SpO2: 100%      Weight:       Height:         Intake and Output:  No intake/output data recorded. No intake/output data recorded. Physical Examination:  General: NAD,Conversant   Neck:  Supple, no mass  Resp:  Lungs CTA B/L, no wheezing , normal respiratory effort  CV:  RRR,  no murmur or rub, LE edema  GI:  Soft, NT, + BS, no HS megaly  Neurologic:  Non focal  Psych:             AAO x 3 appropriate affect       []    High complexity decision making was performed  []    Patient is at high-risk of decompensation with multiple organ involvement    Lab Data Personally Reviewed: I have reviewed all the pertinent labs, microbiology data and radiology studies during assessment.     Recent Labs     12/27/21  0356 12/26/21  2143 12/26/21  1531 12/26/21  0930 12/26/21  0315 12/25/21  2116 12/25/21  1212   * 129* 128* 126* 126*   < > 120*   K 3.8  --   --   --  3.6  --  3.9     --   --   --  95*  --  89*   CO2 25  --   --   --  23  --  24   *  --   --   --  125*  --  107*   BUN 16  --   --   --  17  --  17   CREA 1.05*  --   --   --  1.12*  --  1.18*   CA 8.6  --   --   --  9.1  --  8.9   MG  --   --   --   --   --   --  1.9 ALB  --   --   --   --  3.0*  --   --    ALT  --   --   --   --  20  --   --     < > = values in this interval not displayed. Recent Labs     12/26/21  0315 12/25/21  1212   WBC 8.3 8.9   HGB 12.6 12.1   HCT 36.9 37.0  36.0    314     No results found for: SDES  No results found for: CULT  Recent Results (from the past 24 hour(s))   SODIUM    Collection Time: 12/26/21  3:31 PM   Result Value Ref Range    Sodium 128 (L) 136 - 145 mmol/L   SODIUM    Collection Time: 12/26/21  9:43 PM   Result Value Ref Range    Sodium 129 (L) 136 - 983 mmol/L   METABOLIC PANEL, BASIC    Collection Time: 12/27/21  3:56 AM   Result Value Ref Range    Sodium 130 (L) 136 - 145 mmol/L    Potassium 3.8 3.5 - 5.1 mmol/L    Chloride 100 97 - 108 mmol/L    CO2 25 21 - 32 mmol/L    Anion gap 5 5 - 15 mmol/L    Glucose 103 (H) 65 - 100 mg/dL    BUN 16 6 - 20 MG/DL    Creatinine 1.05 (H) 0.55 - 1.02 MG/DL    BUN/Creatinine ratio 15 12 - 20      GFR est AA >60 >60 ml/min/1.73m2    GFR est non-AA 51 (L) >60 ml/min/1.73m2    Calcium 8.6 8.5 - 10.1 MG/DL           I have reviewed the flowsheets. Chart and Pertinent Notes have been reviewed. No change in PMH ,family and social history from Consult note.       Bo Larios MD  [facility]

## 2021-12-27 NOTE — PROGRESS NOTES
Cardiology Progress Note                                        Admit Date: 12/19/2021    Assessment/Plan:     1. WCT :   most recent event irreg irregular most consistent with Paroxysmal afib with aberancy . echo normal lvef    2. 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 Hx PAF :  Resume eliquis was med PTA     Amiodarone started   4. Uncontrolled htn :  Renal study to eval for LETICIA   ·   Consistent with borderline renal parenchymal disease in the left kidney. · No evidence of hemodynamically significant stenosis of the renal arteries or superior mesenteric artery. 5. Non obstructive cad 4.2019   6. Elevated troponin:    lexiscan MPI     Normal gated rest/stress myocardial perfusion imaging study. No evidence of myocardial ischemia or infarction. Left ventricular ejection fraction is 65 %    7. Back pain   8. Hypotension with bradycardial:  Symptomatic    This is primarily orthostatic  Discussed with Dr. Kin Shone  Will have to accept resting htn   She has previously tolerated verapamil . Will try tomorrow  Short acting 40 tid  All bp should be recorded with standing as this will be the bp we will RX to avoid recurrent orthostasis   9. Hyponatremia  Secondary to hctz. Na 130  Improving              Emelyn Espinal is a 80 y.o. female with      PROBLEM LIST:  Patient Active Problem List    Diagnosis Date Noted    Hypertensive emergency 12/20/2021    Palpitations 12/19/2021    Chest pain 12/19/2021    LBBB (left bundle branch block) 04/08/2019    Acute chest pain 04/08/2019    Uncontrolled hypertension 04/08/2019         Subjective:     Emelyn Espinal denies chest pain.     Visit Vitals  BP (!) 150/82 (BP 1 Location: Right arm, BP Patient Position: Sitting)   Pulse 62   Temp 97.6 °F (36.4 °C)   Resp 18   Ht 5' 8\" (1.727 m)   Wt 208 lb 1.8 oz (94.4 kg)   SpO2 98%   BMI 31.64 kg/m²       Intake/Output Summary (Last 24 hours) at 12/27/2021 1320  Last data filed at 12/27/2021 1145  Gross per 24 hour   Intake    Output 200 ml   Net -200 ml       Objective:      Physical Exam:  HEENT: Perrla, EOMI  Neck: No JVD,  No thyroidmegaly  Resp: CTA bilaterally;  No wheezes or rales  CV: RRR s1s2 No murmur no s3  Abd:Soft, Nontender  Ext: No edema  Neuro: Alert and oriented; Nonfocal  Skin: Warm, Dry, Intact  Pulses: 2+ DP/PT/Rad      Telemetry: normal sinus rhythm    Current Facility-Administered Medications   Medication Dose Route Frequency    [Held by provider] fludrocortisone (FLORINEF) tablet 0.1 mg  0.1 mg Oral DAILY    methocarbamoL (ROBAXIN) injection 500 mg  500 mg IntraVENous Q8H PRN    amiodarone (CORDARONE) tablet 200 mg  200 mg Oral DAILY    [Held by provider] carvediloL (COREG) tablet 25 mg  25 mg Oral BID WITH MEALS    cyclobenzaprine (FLEXERIL) tablet 5 mg  5 mg Oral TID PRN    cyclobenzaprine (FLEXERIL) tablet 10 mg  10 mg Oral TID PRN    morphine injection 1 mg  1 mg IntraVENous Q3H PRN    [Held by provider] losartan (COZAAR) tablet 100 mg  100 mg Oral QPM    apixaban (ELIQUIS) tablet 5 mg  5 mg Oral BID    levothyroxine (SYNTHROID) tablet 75 mcg  75 mcg Oral 6am    nitroglycerin (NITROSTAT) tablet 0.4 mg  0.4 mg SubLINGual PRN    atorvastatin (LIPITOR) tablet 40 mg  40 mg Oral QHS    pantoprazole (PROTONIX) tablet 40 mg  40 mg Oral ACB&D    traMADoL (ULTRAM) tablet 50 mg  50 mg Oral Q4H PRN    sodium chloride (NS) flush 5-40 mL  5-40 mL IntraVENous Q8H    sodium chloride (NS) flush 5-40 mL  5-40 mL IntraVENous PRN    acetaminophen (TYLENOL) tablet 650 mg  650 mg Oral Q6H PRN    Or    acetaminophen (TYLENOL) suppository 650 mg  650 mg Rectal Q6H PRN    polyethylene glycol (MIRALAX) packet 17 g  17 g Oral DAILY PRN    ondansetron (ZOFRAN ODT) tablet 4 mg  4 mg Oral Q8H PRN    Or    ondansetron (ZOFRAN) injection 4 mg  4 mg IntraVENous Q4H PRN         Data Review:   Labs:    Recent Results (from the past 24 hour(s))   SODIUM    Collection Time: 12/26/21  3:31 PM   Result Value Ref Range    Sodium 128 (L) 136 - 145 mmol/L   SODIUM    Collection Time: 12/26/21  9:43 PM   Result Value Ref Range    Sodium 129 (L) 136 - 327 mmol/L   METABOLIC PANEL, BASIC    Collection Time: 12/27/21  3:56 AM   Result Value Ref Range    Sodium 130 (L) 136 - 145 mmol/L    Potassium 3.8 3.5 - 5.1 mmol/L    Chloride 100 97 - 108 mmol/L    CO2 25 21 - 32 mmol/L    Anion gap 5 5 - 15 mmol/L    Glucose 103 (H) 65 - 100 mg/dL    BUN 16 6 - 20 MG/DL    Creatinine 1.05 (H) 0.55 - 1.02 MG/DL    BUN/Creatinine ratio 15 12 - 20      GFR est AA >60 >60 ml/min/1.73m2    GFR est non-AA 51 (L) >60 ml/min/1.73m2    Calcium 8.6 8.5 - 10.1 MG/DL

## 2021-12-27 NOTE — PROGRESS NOTES
Problem: Mobility Impaired (Adult and Pediatric)  Goal: *Acute Goals and Plan of Care (Insert Text)  Description: FUNCTIONAL STATUS PRIOR TO ADMISSION: Patient was independent without use of DME.    HOME SUPPORT PRIOR TO ADMISSION: The patient lived with spouse but did not require assist.    Physical Therapy Goals  Initiated 12/23/2021  1. Patient will move from supine to sit and sit to supine , scoot up and down, and roll side to side in bed with modified independence within 7 day(s). 2.  Patient will transfer from bed to chair and chair to bed with modified independence using the least restrictive device within 7 day(s). 3.  Patient will perform sit to stand with modified independence within 7 day(s). 4.  Patient will ambulate with independence for 150 feet with the least restrictive device within 7 day(s). 5.  Patient will ascend/descend 8 stairs with handrail(s) with modified independence within 7 day(s). Outcome: Progressing Towards Goal   PHYSICAL THERAPY TREATMENT  Patient: Karan Roldan (89 y.o. female)  Date: 12/27/2021  Diagnosis: Chest pain [R07.9]  Palpitations [R00.2]  Hypertensive emergency [I16.1] <principal problem not specified>       Precautions: Fall  Chart, physical therapy assessment, plan of care and goals were reviewed. ASSESSMENT  Patient continues with skilled PT services and is progressing towards goals. Pt received supine in bed and agreeable to therapy. Pt tolerated session well and making good progress towards goals. BP closely monitored during session (MD requested BP in standing. Pt completed bed mobility with supervision, sit<>stand with RW with SBA. Pt tolerated gait training with RW with CGA demonstrating wide LASHAWN, decreased frances. No LOB or instability noted. Pt tolerated static standing at the sink with good balance. Pt assisted back to supine position with all needs met. RN in room.     BP standing pre activity: 116/71  BP standing after gait training x 3 minutes: 132/70    Current Level of Function Impacting Discharge (mobility/balance): supervision bed mobility, SBA transfers with RW, gait training x 50 ft with RW    Other factors to consider for discharge:          PLAN :  Patient continues to benefit from skilled intervention to address the above impairments. Continue treatment per established plan of care. to address goals. Recommendation for discharge: (in order for the patient to meet his/her long term goals)  No skilled physical therapy/ follow up rehabilitation needs identified at this time. This discharge recommendation:  Has been made in collaboration with the attending provider and/or case management    IF patient discharges home will need the following DME: none       SUBJECTIVE:   Patient stated I'm doing all right.     OBJECTIVE DATA SUMMARY:   Critical Behavior:  Neurologic State: Alert  Orientation Level: Oriented X4  Cognition: Follows commands  Safety/Judgement: Awareness of environment  Functional Mobility Training:  Bed Mobility:     Supine to Sit: Supervision  Sit to Supine: Supervision  Scooting: Supervision        Transfers:  Sit to Stand: Stand-by assistance  Stand to Sit: Stand-by assistance                             Balance:  Sitting: Intact  Standing: Intact; With support  Standing - Static: Good  Standing - Dynamic : Fair  Ambulation/Gait Training:  Distance (ft): 60 Feet (ft)     Ambulation - Level of Assistance: Contact guard assistance        Gait Abnormalities: Decreased step clearance        Base of Support: Widened     Speed/Carolyn: Pace decreased (<100 feet/min)  Step Length: Right shortened;Left shortened         Pain Rating:  Pt reported chronic low back pain    Activity Tolerance:   Good    After treatment patient left in no apparent distress:   Supine in bed, Call bell within reach, and Caregiver / family present    COMMUNICATION/COLLABORATION:   The patients plan of care was discussed with: Occupational therapist and Registered nurse.      Susannah Stafford, PT, DPT   Time Calculation: 21 mins

## 2021-12-28 LAB
ALBUMIN SERPL-MCNC: 3.3 G/DL (ref 3.5–5)
ANION GAP SERPL CALC-SCNC: 2 MMOL/L (ref 5–15)
BUN SERPL-MCNC: 13 MG/DL (ref 6–20)
BUN/CREAT SERPL: 14 (ref 12–20)
CALCIUM SERPL-MCNC: 8.9 MG/DL (ref 8.5–10.1)
CHLORIDE SERPL-SCNC: 100 MMOL/L (ref 97–108)
CO2 SERPL-SCNC: 29 MMOL/L (ref 21–32)
CREAT SERPL-MCNC: 0.92 MG/DL (ref 0.55–1.02)
FOLATE BLD-MCNC: 483 NG/ML
FOLATE RBC-MCNC: 1305 NG/ML
GLUCOSE SERPL-MCNC: 98 MG/DL (ref 65–100)
HCT VFR BLD AUTO: 37 % (ref 34–46.6)
PHOSPHATE SERPL-MCNC: 2.7 MG/DL (ref 2.6–4.7)
POTASSIUM SERPL-SCNC: 4.3 MMOL/L (ref 3.5–5.1)
SODIUM SERPL-SCNC: 131 MMOL/L (ref 136–145)

## 2021-12-28 PROCEDURE — 74011250637 HC RX REV CODE- 250/637: Performed by: INTERNAL MEDICINE

## 2021-12-28 PROCEDURE — 36415 COLL VENOUS BLD VENIPUNCTURE: CPT

## 2021-12-28 PROCEDURE — 74011250637 HC RX REV CODE- 250/637: Performed by: STUDENT IN AN ORGANIZED HEALTH CARE EDUCATION/TRAINING PROGRAM

## 2021-12-28 PROCEDURE — 74011250637 HC RX REV CODE- 250/637: Performed by: FAMILY MEDICINE

## 2021-12-28 PROCEDURE — 80069 RENAL FUNCTION PANEL: CPT

## 2021-12-28 PROCEDURE — 74011250637 HC RX REV CODE- 250/637: Performed by: HOSPITALIST

## 2021-12-28 PROCEDURE — 99232 SBSQ HOSP IP/OBS MODERATE 35: CPT | Performed by: PSYCHIATRY & NEUROLOGY

## 2021-12-28 PROCEDURE — 65660000000 HC RM CCU STEPDOWN

## 2021-12-28 RX ORDER — VERAPAMIL HYDROCHLORIDE 80 MG/1
40 TABLET ORAL 3 TIMES DAILY
Status: DISCONTINUED | OUTPATIENT
Start: 2021-12-28 | End: 2021-12-29 | Stop reason: HOSPADM

## 2021-12-28 RX ADMIN — VERAPAMIL HYDROCHLORIDE 40 MG: 80 TABLET ORAL at 17:00

## 2021-12-28 RX ADMIN — ATORVASTATIN CALCIUM 40 MG: 40 TABLET, FILM COATED ORAL at 21:55

## 2021-12-28 RX ADMIN — APIXABAN 5 MG: 5 TABLET, FILM COATED ORAL at 08:56

## 2021-12-28 RX ADMIN — VERAPAMIL HYDROCHLORIDE 40 MG: 80 TABLET ORAL at 21:53

## 2021-12-28 RX ADMIN — Medication 10 ML: at 16:57

## 2021-12-28 RX ADMIN — ACETAMINOPHEN 650 MG: 325 TABLET ORAL at 04:31

## 2021-12-28 RX ADMIN — CYCLOBENZAPRINE 10 MG: 10 TABLET, FILM COATED ORAL at 21:53

## 2021-12-28 RX ADMIN — APIXABAN 5 MG: 5 TABLET, FILM COATED ORAL at 21:55

## 2021-12-28 RX ADMIN — PANTOPRAZOLE SODIUM 40 MG: 40 TABLET, DELAYED RELEASE ORAL at 07:12

## 2021-12-28 RX ADMIN — VERAPAMIL HYDROCHLORIDE 40 MG: 80 TABLET ORAL at 08:59

## 2021-12-28 RX ADMIN — Medication 10 ML: at 07:12

## 2021-12-28 RX ADMIN — Medication 10 ML: at 21:57

## 2021-12-28 RX ADMIN — LEVOTHYROXINE SODIUM 75 MCG: 0.07 TABLET ORAL at 07:12

## 2021-12-28 RX ADMIN — TRAMADOL HYDROCHLORIDE 50 MG: 50 TABLET, COATED ORAL at 21:53

## 2021-12-28 RX ADMIN — AMIODARONE HYDROCHLORIDE 200 MG: 200 TABLET ORAL at 08:56

## 2021-12-28 RX ADMIN — PANTOPRAZOLE SODIUM 40 MG: 40 TABLET, DELAYED RELEASE ORAL at 16:57

## 2021-12-28 NOTE — PROGRESS NOTES
Cardiology Progress Note                                        Admit Date: 12/19/2021    Assessment/Plan:     1. WCT :   most recent event irreg irregular most consistent with Paroxysmal afib with aberancy . echo normal lvef    2. 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 Hx PAF :  Resume eliquis was med PTA     Amiodarone started   4. Uncontrolled htn :  Renal study to eval for LETICIA   ·   Consistent with borderline renal parenchymal disease in the left kidney. · No evidence of hemodynamically significant stenosis of the renal arteries or superior mesenteric artery. 5. Non obstructive cad 4.2019   6. Elevated troponin:    lexiscan MPI     Normal gated rest/stress myocardial perfusion imaging study. No evidence of myocardial ischemia or infarction. Left ventricular ejection fraction is 65 %    7. Back pain   8. Hypotension with bradycardial:  Symptomatic    This is primarily orthostatic  Discussed with Dr. Chad Mclaughlin  Will have to accept resting htn   She has previously tolerated verapamil . Thus far tolerating Short acting 40 tid  All bp should be recorded with standing as this will be the bp we will RX to avoid recurrent orthostasis   9. Hyponatremia  Secondary to hctz. Na 131  Improving              Jenna Franco is a 80 y.o. female with      PROBLEM LIST:  Patient Active Problem List    Diagnosis Date Noted    Hypertensive emergency 12/20/2021    Palpitations 12/19/2021    Chest pain 12/19/2021    LBBB (left bundle branch block) 04/08/2019    Acute chest pain 04/08/2019    Uncontrolled hypertension 04/08/2019         Subjective:     Jenna Franco denies chest pain.     Visit Vitals  BP (!) 99/59 (BP Patient Position: Standing)   Pulse 62   Temp 97.7 °F (36.5 °C)   Resp 20   Ht 5' 8\" (1.727 m)   Wt 209 lb 7 oz (95 kg)   SpO2 100%   BMI 31.84 kg/m²       Intake/Output Summary (Last 24 hours) at 12/28/2021 1142  Last data filed at 12/28/2021 0858  Gross per 24 hour   Intake 420 ml Output 775 ml   Net -355 ml       Objective:      Physical Exam:  HEENT: Perrla, EOMI  Neck: No JVD,  No thyroidmegaly  Resp: CTA bilaterally;  No wheezes or rales  CV: RRR s1s2 No murmur no s3  Abd:Soft, Nontender  Ext: No edema  Neuro: Alert and oriented; Nonfocal  Skin: Warm, Dry, Intact  Pulses: 2+ DP/PT/Rad      Telemetry: normal sinus rhythm    Current Facility-Administered Medications   Medication Dose Route Frequency    verapamiL (CALAN) tablet 40 mg  40 mg Oral BID    methocarbamoL (ROBAXIN) injection 500 mg  500 mg IntraVENous Q8H PRN    amiodarone (CORDARONE) tablet 200 mg  200 mg Oral DAILY    cyclobenzaprine (FLEXERIL) tablet 5 mg  5 mg Oral TID PRN    cyclobenzaprine (FLEXERIL) tablet 10 mg  10 mg Oral TID PRN    morphine injection 1 mg  1 mg IntraVENous Q3H PRN    apixaban (ELIQUIS) tablet 5 mg  5 mg Oral BID    levothyroxine (SYNTHROID) tablet 75 mcg  75 mcg Oral 6am    nitroglycerin (NITROSTAT) tablet 0.4 mg  0.4 mg SubLINGual PRN    atorvastatin (LIPITOR) tablet 40 mg  40 mg Oral QHS    pantoprazole (PROTONIX) tablet 40 mg  40 mg Oral ACB&D    traMADoL (ULTRAM) tablet 50 mg  50 mg Oral Q4H PRN    sodium chloride (NS) flush 5-40 mL  5-40 mL IntraVENous Q8H    sodium chloride (NS) flush 5-40 mL  5-40 mL IntraVENous PRN    acetaminophen (TYLENOL) tablet 650 mg  650 mg Oral Q6H PRN    Or    acetaminophen (TYLENOL) suppository 650 mg  650 mg Rectal Q6H PRN    polyethylene glycol (MIRALAX) packet 17 g  17 g Oral DAILY PRN    ondansetron (ZOFRAN ODT) tablet 4 mg  4 mg Oral Q8H PRN    Or    ondansetron (ZOFRAN) injection 4 mg  4 mg IntraVENous Q4H PRN         Data Review:   Labs:    Recent Results (from the past 24 hour(s))   RENAL FUNCTION PANEL    Collection Time: 12/27/21  6:33 PM   Result Value Ref Range    Sodium 130 (L) 136 - 145 mmol/L    Potassium 4.2 3.5 - 5.1 mmol/L    Chloride 100 97 - 108 mmol/L    CO2 24 21 - 32 mmol/L    Anion gap 6 5 - 15 mmol/L    Glucose 137 (H) 65 - 100 mg/dL    BUN 15 6 - 20 MG/DL    Creatinine 1.01 0.55 - 1.02 MG/DL    BUN/Creatinine ratio 15 12 - 20      GFR est AA >60 >60 ml/min/1.73m2    GFR est non-AA 53 (L) >60 ml/min/1.73m2    Calcium 9.0 8.5 - 10.1 MG/DL    Phosphorus 2.7 2.6 - 4.7 MG/DL    Albumin 3.4 (L) 3.5 - 5.0 g/dL   RENAL FUNCTION PANEL    Collection Time: 12/28/21  4:26 AM   Result Value Ref Range    Sodium 131 (L) 136 - 145 mmol/L    Potassium 4.3 3.5 - 5.1 mmol/L    Chloride 100 97 - 108 mmol/L    CO2 29 21 - 32 mmol/L    Anion gap 2 (L) 5 - 15 mmol/L    Glucose 98 65 - 100 mg/dL    BUN 13 6 - 20 MG/DL    Creatinine 0.92 0.55 - 1.02 MG/DL    BUN/Creatinine ratio 14 12 - 20      GFR est AA >60 >60 ml/min/1.73m2    GFR est non-AA 59 (L) >60 ml/min/1.73m2    Calcium 8.9 8.5 - 10.1 MG/DL    Phosphorus 2.7 2.6 - 4.7 MG/DL    Albumin 3.3 (L) 3.5 - 5.0 g/dL

## 2021-12-28 NOTE — PROGRESS NOTES
[facility]  Hospitalist Group                                                                                          Hospitalist Progress Note  Karo Cuevas MD  Answering service: [phone] from in house phone        Date of Service:  2021  NAME:  Meggan Qureshi  :  1940  MRN:  [id number]      Admission Summary:   69y/o female with pmh of HLD, HTN who presented with recurrent chest pain in the context of HTN emergency. [facility] [facility] [place] [facility] with BPs that were elevated and associated with chest pain. Her first visit, she was started on additional BP medications, and discharged after having neg enzymes, and neg EKG. She represented with additionally elevated BPs; and required admission. Interval history / Subjective:      Patient seen and examined, son and her  in the room. Patient is up and walking to bathroom without dizziness or lightheadedness. No symptoms at all despite orthostatic drop in blood pressure. Discussed with Dr. Louie Prader recommended to continue verapamil 40 mg 3 times daily(it was ordered twice daily, order modified after checking with Dr. Benito Morrissey as he had noted 3 times daily in his note)  Assessment & Plan:      Unresponsiveness/LOC ,likley orthostatic. BP was 83/38  -RRT and then code stroke was activated. -CTH negative. CTA showed no occlusion or stenosis. CTP large right MCA perfusion mismatch    -Neurology evaluated. Brain MRI is unremarkable. Hyponatremia,stable 130s,likely due to diuretics,HCTZ nd aldactone   -acute on chronic ,doubt seizure from the history I could gather   -Renal consult appreciated,s/p 3% saline x 4 hrs. HCTZ and aldactone stopped. Hypertension,initially presented with hypertension ememrgency. Orthostatic hypotension and LOC on   - She was on lisinopril 20 mg and lopressor PTA.   --She was getting aldactone 25 mg - and 50 mg  and ,;coreg 25 mg bid on 12/20 and 12.5 mg bid till 12/24;HCTZ 12/20-12/22;losartan  100 mg 12/19-12/20 and 50 mg on 12/21  --Patient started on verapamil 40 mg 3 times daily per cardiologist.  She still has orthostatic hypotension but surprisingly asymptomatic, up and walking back and forth the bathroom without any trouble. Cardiology recommended only to check standing blood pressure and go by that to treat BP . Discussed with patient, her son and  at the bedside. CARRILLO  -prerenal likely due to hypotension  -improved     Chest pain  - Stress test negative  - Troponins negative  - EKG non focal   - Cardiology following     A. fib with RVR   amiodarone to 200 mg daily. Already on Eliquis      GERD - start PPI     Hypothyroidism - levothyroxine        Code status: FULL  DVT prophylaxis: Eliquis     Care Plan discussed with: Patient/Family  Anticipated Disposition: Home w/Family  Anticipated Discharge: Home tomorrow if blood pressure remains stable. Hospital Problems  Date Reviewed: 12/20/2021          Codes Class Noted POA    Hypertensive emergency ICD-10-CM: I16.1  ICD-9-CM: 401.9  12/20/2021 Unknown        Palpitations ICD-10-CM: R00.2  ICD-9-CM: 785.1  12/19/2021 Unknown        Chest pain ICD-10-CM: R07.9  ICD-9-CM: 786.50  12/19/2021 Unknown                Review of Systems:   A comprehensive review of systems was negative except for that written in the HPI. Vital Signs:    Last 24hrs VS reviewed since prior progress note.  Most recent are:  Visit Vitals  BP (!) 177/91 (BP 1 Location: Left upper arm, BP Patient Position: At rest)   Pulse 66   Temp 98.5 °F (36.9 °C)   Resp 20   Ht 5' 8\" (1.727 m)   Wt 95 kg (209 lb 7 oz)   SpO2 99%   BMI 31.84 kg/m²         Intake/Output Summary (Last 24 hours) at 12/28/2021 1737  Last data filed at 12/28/2021 1526  Gross per 24 hour   Intake 900 ml   Output 275 ml   Net 625 ml        Physical Examination:     I had a face to face encounter with this patient and independently examined them on 12/28/2021 as outlined below:          Constitutional:  No acute pr does not remember what happened    ENT:  Oral mucosa moist, oropharynx benign. Resp:  CTA bilaterally. No wheezing/rhonchi/rales. No accessory muscle use   CV:  Regular rhythm, normal rate, no murmurs, gallops, rubs    GI:  Soft, non distended, non tender. normoactive bowel sounds, no hepatosplenomegaly     Musculoskeletal:  No edema, warm, 2+ pulses throughout     Neurologic:  Moves all extremities. Awake,alert, repeating sentences   CN II-XII intact, no focal deficit            Data Review:    Review and/or order of clinical lab test  Review and/or order of tests in the radiology section of CPT  Review and/or order of tests in the medicine section of CPT      Labs:     Recent Labs     12/26/21 0315   WBC 8.3   HGB 12.6   HCT 36.9        Recent Labs     12/28/21 0426 12/27/21 1833 12/27/21  0356   * 130* 130*   K 4.3 4.2 3.8    100 100   CO2 29 24 25   BUN 13 15 16   CREA 0.92 1.01 1.05*   GLU 98 137* 103*   CA 8.9 9.0 8.6   PHOS 2.7 2.7  --      Recent Labs     12/28/21 0426 12/27/21 1833 12/26/21  0315   ALT  --   --  20   AP  --   --  74   TBILI  --   --  0.6   TP  --   --  6.7   ALB 3.3* 3.4* 3.0*   GLOB  --   --  3.7     No results for input(s): INR, PTP, APTT, INREXT, INREXT in the last 72 hours. No results for input(s): FE, TIBC, PSAT, FERR in the last 72 hours. No results found for: FOL, RBCF   No results for input(s): PH, PCO2, PO2 in the last 72 hours. No results for input(s): CPK, CKNDX, TROIQ in the last 72 hours.     No lab exists for component: CPKMB  No results found for: CHOL, CHOLX, CHLST, CHOLV, HDL, HDLP, LDL, LDLC, DLDLP, TGLX, TRIGL, TRIGP, CHHD, CHHDX  Lab Results   Component Value Date/Time    Glucose (POC) 104 12/25/2021 12:00 PM     Lab Results   Component Value Date/Time    Color YELLOW/STRAW 12/25/2021 05:04 PM    Appearance CLEAR 12/25/2021 05:04 PM    Specific gravity 1.016 12/25/2021 05:04 PM    pH (UA) 7.0 12/25/2021 05:04 PM    Protein Negative 12/25/2021 05:04 PM    Glucose Negative 12/25/2021 05:04 PM    Ketone Negative 12/25/2021 05:04 PM    Bilirubin Negative 12/25/2021 05:04 PM    Urobilinogen 0.2 12/25/2021 05:04 PM    Nitrites Negative 12/25/2021 05:04 PM    Leukocyte Esterase Negative 12/25/2021 05:04 PM    Epithelial cells FEW 12/25/2021 05:04 PM    Bacteria Negative 12/25/2021 05:04 PM    WBC 0-4 12/25/2021 05:04 PM    RBC 0-5 12/25/2021 05:04 PM         Medications Reviewed:     Current Facility-Administered Medications   Medication Dose Route Frequency    verapamiL (CALAN) tablet 40 mg  40 mg Oral TID    methocarbamoL (ROBAXIN) injection 500 mg  500 mg IntraVENous Q8H PRN    amiodarone (CORDARONE) tablet 200 mg  200 mg Oral DAILY    cyclobenzaprine (FLEXERIL) tablet 5 mg  5 mg Oral TID PRN    cyclobenzaprine (FLEXERIL) tablet 10 mg  10 mg Oral TID PRN    morphine injection 1 mg  1 mg IntraVENous Q3H PRN    apixaban (ELIQUIS) tablet 5 mg  5 mg Oral BID    levothyroxine (SYNTHROID) tablet 75 mcg  75 mcg Oral 6am    nitroglycerin (NITROSTAT) tablet 0.4 mg  0.4 mg SubLINGual PRN    atorvastatin (LIPITOR) tablet 40 mg  40 mg Oral QHS    pantoprazole (PROTONIX) tablet 40 mg  40 mg Oral ACB&D    traMADoL (ULTRAM) tablet 50 mg  50 mg Oral Q4H PRN    sodium chloride (NS) flush 5-40 mL  5-40 mL IntraVENous Q8H    sodium chloride (NS) flush 5-40 mL  5-40 mL IntraVENous PRN    acetaminophen (TYLENOL) tablet 650 mg  650 mg Oral Q6H PRN    Or    acetaminophen (TYLENOL) suppository 650 mg  650 mg Rectal Q6H PRN    polyethylene glycol (MIRALAX) packet 17 g  17 g Oral DAILY PRN    ondansetron (ZOFRAN ODT) tablet 4 mg  4 mg Oral Q8H PRN    Or    ondansetron (ZOFRAN) injection 4 mg  4 mg IntraVENous Q4H PRN     ______________________________________________________________________  EXPECTED LENGTH OF STAY: 2d 4h  ACTUAL LENGTH OF STAY:          7                 Wondaya Juan David Tello MD

## 2021-12-28 NOTE — PROGRESS NOTES
Problem: Falls - Risk of  Goal: *Absence of Falls  Description: Document Dwayne Murillo Fall Risk and appropriate interventions in the flowsheet.   Outcome: Progressing Towards Goal  Note: Fall Risk Interventions:  Mobility Interventions: Communicate number of staff needed for ambulation/transfer,Patient to call before getting OOB,PT Consult for mobility concerns,Strengthening exercises (ROM-active/passive)         Medication Interventions: Assess postural VS orthostatic hypotension,Patient to call before getting OOB,Evaluate medications/consider consulting pharmacy,Teach patient to arise slowly    Elimination Interventions: Call light in reach,Patient to call for help with toileting needs,Toileting schedule/hourly rounds              Problem: Patient Education: Go to Patient Education Activity  Goal: Patient/Family Education  Outcome: Progressing Towards Goal     Problem: Hypertension  Goal: *Blood pressure within specified parameters  Outcome: Progressing Towards Goal  Goal: *Fluid volume balance  Outcome: Progressing Towards Goal  Goal: *Labs within defined limits  Outcome: Progressing Towards Goal     Problem: Patient Education: Go to Patient Education Activity  Goal: Patient/Family Education  Outcome: Progressing Towards Goal     Problem: Patient Education: Go to Patient Education Activity  Goal: Patient/Family Education  Outcome: Progressing Towards Goal

## 2021-12-28 NOTE — PROGRESS NOTES
: [facility] BEDSIDE_VERBAL shift change report received from [address] [facility] RN to [employer]. Report included the following information SBAR, Intake/Output, MAR, Recent Results, Med Rec Status, Cardiac Rhythm NSR and Alarm Parameters . BP lyin/90 (126), sittin/54, standing BP: 141/78    0845: [facility] BEDSIDE_VERBALmshift change report given to [facility] (oncoming nurse) by [employer] (offgoing nurse). Report included the following information SBAR, Intake/Output, MAR, Accordion, Recent Results, Med Rec Status, Cardiac Rhythm NSR and Alarm Parameters .

## 2021-12-28 NOTE — PROGRESS NOTES
[facility]   [address] [facility], [id number] [place] [address]  Phone: [phone]   [phone]    [email or web address]     Nephrology Progress Note    Patient Name : Patty Marin      : 1940     MRN : [id number]  Date of Admission : 2021  Date of Servive : 21    CC: Follow up for Hyponatremia       Assessment and Plan   Acute Hyponatremia :  - 2/2 combination of HCTZ and aldactone  - Urine chemistries consistent w/ above picture   - Na stable  - cont current care  - daily Na levels while here     Supine HTN   Orthostatic Hypotension :  - Renal duplex : No LETICIA  - mx per cards      PAF:  - on amiodarone    Lumbar spinal stenosis      Hypothyroidism      Mild CKD : based on imaging   - Cr stable     Interval History:  Seen and examined. Feeling better. BP dropping with standing. Elevated when supine. No cp, sob, n/v/d    Review of Systems: A comprehensive review of systems was negative except for that written in the HPI.     Current Medications:   Current Facility-Administered Medications   Medication Dose Route Frequency    verapamiL (CALAN) tablet 40 mg  40 mg Oral BID    methocarbamoL (ROBAXIN) injection 500 mg  500 mg IntraVENous Q8H PRN    amiodarone (CORDARONE) tablet 200 mg  200 mg Oral DAILY    cyclobenzaprine (FLEXERIL) tablet 5 mg  5 mg Oral TID PRN    cyclobenzaprine (FLEXERIL) tablet 10 mg  10 mg Oral TID PRN    morphine injection 1 mg  1 mg IntraVENous Q3H PRN    apixaban (ELIQUIS) tablet 5 mg  5 mg Oral BID    levothyroxine (SYNTHROID) tablet 75 mcg  75 mcg Oral 6am    nitroglycerin (NITROSTAT) tablet 0.4 mg  0.4 mg SubLINGual PRN    atorvastatin (LIPITOR) tablet 40 mg  40 mg Oral QHS    pantoprazole (PROTONIX) tablet 40 mg  40 mg Oral ACB&D    traMADoL (ULTRAM) tablet 50 mg  50 mg Oral Q4H PRN    sodium chloride (NS) flush 5-40 mL  5-40 mL IntraVENous Q8H    sodium chloride (NS) flush 5-40 mL  5-40 mL IntraVENous PRN    acetaminophen (TYLENOL) tablet 650 mg  650 mg Oral Q6H PRN    Or    acetaminophen (TYLENOL) suppository 650 mg  650 mg Rectal Q6H PRN    polyethylene glycol (MIRALAX) packet 17 g  17 g Oral DAILY PRN    ondansetron (ZOFRAN ODT) tablet 4 mg  4 mg Oral Q8H PRN    Or    ondansetron (ZOFRAN) injection 4 mg  4 mg IntraVENous Q4H PRN      No Known Allergies    Objective:  Vitals:    Vitals:    12/28/21 0700 12/28/21 0741 12/28/21 0858 12/28/21 0901   BP:  95/67 (!) 160/95 (!) 253/109   Pulse: 67 77 77 67   Resp:  18     Temp:  97.7 °F (36.5 °C)     SpO2:  99%     Weight:       Height:         Intake and Output:  No intake/output data recorded. 12/26 1901 - 12/28 0700  In: 5 [P.O.:420]  Out: 775 [Urine:775]    Physical Examination:  General: NAD,Conversant   Neck:  Supple, no mass  Resp:  Lungs CTA B/L, no wheezing , normal respiratory effort  CV:  RRR,  no murmur or rub, LE edema  GI:  Soft, NT, + BS, no HS megaly  Neurologic:  Non focal  Psych:             AAO x 3 appropriate affect       []    High complexity decision making was performed  []    Patient is at high-risk of decompensation with multiple organ involvement    Lab Data Personally Reviewed: I have reviewed all the pertinent labs, microbiology data and radiology studies during assessment.     Recent Labs     12/28/21  0426 12/27/21  1833 12/27/21  0356 12/26/21  2143 12/26/21  1531 12/26/21  0930 12/26/21  0315 12/25/21  2116 12/25/21  1212   * 130* 130* 129* 128*   < > 126*   < > 120*   K 4.3 4.2 3.8  --   --   --  3.6  --  3.9    100 100  --   --   --  95*  --  89*   CO2 29 24 25  --   --   --  23  --  24   GLU 98 137* 103*  --   --   --  125*  --  107*   BUN 13 15 16  --   --   --  17  --  17   CREA 0.92 1.01 1.05*  --   --   --  1.12*  --  1.18*   CA 8.9 9.0 8.6  --   --   --  9.1  --  8.9   MG  --   --   --   --   --   --   --   --  1.9   PHOS 2.7 2.7  --   --   --   --   --   --   --    ALB 3.3* 3.4*  --   --   --   --  3.0*  --   -- ALT  --   --   --   --   --   --  20  --   --     < > = values in this interval not displayed. Recent Labs     12/26/21  0315 12/25/21  1212   WBC 8.3 8.9   HGB 12.6 12.1   HCT 36.9 37.0  36.0    314     No results found for: SDES  No results found for: CULT  Recent Results (from the past 24 hour(s))   RENAL FUNCTION PANEL    Collection Time: 12/27/21  6:33 PM   Result Value Ref Range    Sodium 130 (L) 136 - 145 mmol/L    Potassium 4.2 3.5 - 5.1 mmol/L    Chloride 100 97 - 108 mmol/L    CO2 24 21 - 32 mmol/L    Anion gap 6 5 - 15 mmol/L    Glucose 137 (H) 65 - 100 mg/dL    BUN 15 6 - 20 MG/DL    Creatinine 1.01 0.55 - 1.02 MG/DL    BUN/Creatinine ratio 15 12 - 20      GFR est AA >60 >60 ml/min/1.73m2    GFR est non-AA 53 (L) >60 ml/min/1.73m2    Calcium 9.0 8.5 - 10.1 MG/DL    Phosphorus 2.7 2.6 - 4.7 MG/DL    Albumin 3.4 (L) 3.5 - 5.0 g/dL   RENAL FUNCTION PANEL    Collection Time: 12/28/21  4:26 AM   Result Value Ref Range    Sodium 131 (L) 136 - 145 mmol/L    Potassium 4.3 3.5 - 5.1 mmol/L    Chloride 100 97 - 108 mmol/L    CO2 29 21 - 32 mmol/L    Anion gap 2 (L) 5 - 15 mmol/L    Glucose 98 65 - 100 mg/dL    BUN 13 6 - 20 MG/DL    Creatinine 0.92 0.55 - 1.02 MG/DL    BUN/Creatinine ratio 14 12 - 20      GFR est AA >60 >60 ml/min/1.73m2    GFR est non-AA 59 (L) >60 ml/min/1.73m2    Calcium 8.9 8.5 - 10.1 MG/DL    Phosphorus 2.7 2.6 - 4.7 MG/DL    Albumin 3.3 (L) 3.5 - 5.0 g/dL           I have reviewed the flowsheets. Chart and Pertinent Notes have been reviewed. No change in PMH ,family and social history from Consult note.       Adrian Gilbert MD  [facility]

## 2021-12-28 NOTE — PROGRESS NOTES
Provided pastoral care visit to [facility] 5 patient. Did not include sacramental care.     Win Leonard

## 2021-12-29 VITALS
OXYGEN SATURATION: 98 % | TEMPERATURE: 98.1 F | BODY MASS INDEX: 31.47 KG/M2 | WEIGHT: 207.67 LBS | HEART RATE: 69 BPM | RESPIRATION RATE: 18 BRPM | HEIGHT: 68 IN | SYSTOLIC BLOOD PRESSURE: 115 MMHG | DIASTOLIC BLOOD PRESSURE: 71 MMHG

## 2021-12-29 LAB
ALBUMIN SERPL-MCNC: 3 G/DL (ref 3.5–5)
ANION GAP SERPL CALC-SCNC: 7 MMOL/L (ref 5–15)
BUN SERPL-MCNC: 12 MG/DL (ref 6–20)
BUN/CREAT SERPL: 14 (ref 12–20)
CALCIUM SERPL-MCNC: 8.7 MG/DL (ref 8.5–10.1)
CHLORIDE SERPL-SCNC: 98 MMOL/L (ref 97–108)
CO2 SERPL-SCNC: 26 MMOL/L (ref 21–32)
CREAT SERPL-MCNC: 0.86 MG/DL (ref 0.55–1.02)
GLUCOSE SERPL-MCNC: 91 MG/DL (ref 65–100)
PHOSPHATE SERPL-MCNC: 3 MG/DL (ref 2.6–4.7)
POTASSIUM SERPL-SCNC: 4.1 MMOL/L (ref 3.5–5.1)
SODIUM SERPL-SCNC: 131 MMOL/L (ref 136–145)

## 2021-12-29 PROCEDURE — 74011250637 HC RX REV CODE- 250/637: Performed by: FAMILY MEDICINE

## 2021-12-29 PROCEDURE — 80069 RENAL FUNCTION PANEL: CPT

## 2021-12-29 PROCEDURE — 74011250637 HC RX REV CODE- 250/637: Performed by: INTERNAL MEDICINE

## 2021-12-29 PROCEDURE — 97116 GAIT TRAINING THERAPY: CPT

## 2021-12-29 PROCEDURE — 36415 COLL VENOUS BLD VENIPUNCTURE: CPT

## 2021-12-29 PROCEDURE — 97530 THERAPEUTIC ACTIVITIES: CPT

## 2021-12-29 PROCEDURE — 74011250637 HC RX REV CODE- 250/637: Performed by: HOSPITALIST

## 2021-12-29 RX ORDER — VERAPAMIL HYDROCHLORIDE 40 MG/1
40 TABLET ORAL 3 TIMES DAILY
Qty: 90 TABLET | Refills: 0 | Status: SHIPPED | OUTPATIENT
Start: 2021-12-29 | End: 2022-01-28

## 2021-12-29 RX ORDER — VERAPAMIL HYDROCHLORIDE 40 MG/1
40 TABLET ORAL 3 TIMES DAILY
Qty: 90 TABLET | Refills: 0 | Status: SHIPPED | OUTPATIENT
Start: 2021-12-29 | End: 2021-12-29

## 2021-12-29 RX ORDER — AMIODARONE HYDROCHLORIDE 200 MG/1
200 TABLET ORAL DAILY
Qty: 30 TABLET | Refills: 0 | Status: SHIPPED | OUTPATIENT
Start: 2021-12-29 | End: 2022-01-28

## 2021-12-29 RX ADMIN — PANTOPRAZOLE SODIUM 40 MG: 40 TABLET, DELAYED RELEASE ORAL at 17:16

## 2021-12-29 RX ADMIN — APIXABAN 5 MG: 5 TABLET, FILM COATED ORAL at 08:55

## 2021-12-29 RX ADMIN — Medication 10 ML: at 17:16

## 2021-12-29 RX ADMIN — VERAPAMIL HYDROCHLORIDE 40 MG: 80 TABLET ORAL at 08:55

## 2021-12-29 RX ADMIN — Medication 10 ML: at 05:23

## 2021-12-29 RX ADMIN — VERAPAMIL HYDROCHLORIDE 40 MG: 80 TABLET ORAL at 17:16

## 2021-12-29 RX ADMIN — PANTOPRAZOLE SODIUM 40 MG: 40 TABLET, DELAYED RELEASE ORAL at 07:23

## 2021-12-29 RX ADMIN — LEVOTHYROXINE SODIUM 75 MCG: 0.07 TABLET ORAL at 05:23

## 2021-12-29 RX ADMIN — AMIODARONE HYDROCHLORIDE 200 MG: 200 TABLET ORAL at 08:55

## 2021-12-29 NOTE — DISCHARGE SUMMARY
Discharge Summary       PATIENT ID: Teddy Grove  MRN: [id number]   YOB: 1940    DATE OF ADMISSION: 12/19/2021  1:45 AM    DATE OF DISCHARGE: 12/29/21     PRIMARY CARE PROVIDER: Tomi Preston MD     ATTENDING PHYSICIAN: Sebastián Espinosa MD    DISCHARGING PROVIDER: Sebastián Espinosa MD    To contact this individual call [phone] and ask the  to page. If unavailable ask to be transferred the Adult Hospitalist Department. CONSULTATIONS: IP CONSULT TO CARDIOLOGY  IP CONSULT TO NEUROLOGY  IP CONSULT TO NEPHROLOGY  IP CONSULT TO NEPHROLOGY    PROCEDURES/SURGERIES: * No surgery found *    [address] COURSE:   101/58 Abnormal 72Standing;Walking 12/29/21 69671986/46 Abnormal 64 Abnormal Standing 12/29/21 68339387/45 Abnormal    Admission Summary:   71y/o female with pmh of HLD, HTN who presented with recurrent chest pain in the context of HTN emergency. Pateint presented to [facility] ER twice with BPs that were elevated and associated with chest pain. Her first visit, she was started on additional BP medications, and discharged after having neg enzymes, and neg EKG. She represented with additionally elevated BPs; and required admission. 101/58 Abnormal 72Standing;Walking 12/29/21 36729577/15 Abnormal 64 Abnormal Standing 12/29/21 44383572/60 Abnormal    12/25 Unresponsiveness/LOC ,likley orthostatic. BP was 83/38  -RRT and then code stroke was activated. -CTH negative. CTA showed no occlusion or stenosis. CTP large right MCA perfusion mismatch    -Neurology evaluated. Brain MRI is unremarkable. Hyponatremia,stable 130s,likely due to diuretics,HCTZ nd aldactone   -acute on chronic ,doubt seizure from the history I could gather   -Renal consult appreciated,s/p 3% saline x 4 hrs. HCTZ and aldactone stopped. Hypertension,initially presented with hypertension ememrgency. Orthostatic hypotension and LOC on 12/25  - She was on lisinopril 20 mg and lopressor PTA. --She was getting aldactone 25 mg 12/22-12-23 and 50 mg 12/24 and 12/25,12/25;coreg 25 mg bid on 12/20 and 12.5 mg bid till 12/24;HCTZ 12/20-12/22;losartan  100 mg 12/19-12/20 and 50 mg on 12/21  --Patient started on verapamil 40 mg 3 times daily per cardiologist.  She still has orthostatic hypotension but surprisingly asymptomatic, up and walking back and forth the bathroom without any trouble. Cardiology recommended only to check standing blood pressure and go by that to treat BP . Discussed with patient, her son and  at the bedside. --PT walked patient on the hallway before discharge,orthostatic hypotension improved with more activity and walking distances,she was minimally symptomatic. I discussed this with her primary cardiologist who okayed for patient to go on verapamil 40 mg tid. I have advised pt to monitor her bP at home (she has BOP machine ) with direction when to seek help from her doctors. CARRILLO  -prerenal likely due to hypotension  -improved      Chest pain  - Stress test negative  - Troponins negative  - EKG non focal   - Cardiology followed      A. fib with RVR   amiodarone to 200 mg daily.   Already on Eliquis      GERD - start PPI      Hypothyroidism - levothyroxine            PENDING TEST RESULTS:   At the time of discharge the following test results are still pending: none    FOLLOW UP APPOINTMENTS:    Follow-up Information     Follow up With Specialties Details Why Contact Info    Cortes Menjivar MD [facility] Medicine Call   [address] [place] [address]  [address]  [address]      Marianna Bruner MD Cardio Vascular Surgery, Clinical Cardiac Electrophysiology Call in 1 week For hospital follow up, and echocardiogram  [place] [id number] [phone]               DIET: Regular Diet and Cardiac Diet    ACTIVITY: Activity as tolerated and PT/OT per [address] [place] [address] Avenue:  Discharge Medication List as of 12/29/2021  5:12 PM      START taking these medications    Details   amiodarone (CORDARONE) 200 mg tablet Take 1 Tablet by mouth daily for 30 days. , Normal, Disp-30 Tablet, R-0      Comp. Stocking,Thigh,Long,Large misc Dx:orthoststic hypotension., Normal, Disp-1 Each, R-0      pantoprazole (PROTONIX) 40 mg tablet Take 1 Tablet by mouth Before breakfast and dinner for 14 days. , Normal, Disp-28 Tablet, R-0         CONTINUE these medications which have CHANGED    Details   verapamiL (CALAN) 40 mg tablet Take 1 Tablet by mouth three (3) times daily for 30 days. , Normal, Disp-90 Tablet, R-0         CONTINUE these medications which have NOT CHANGED    Details   apixaban (Eliquis) 5 mg tablet Take 5 mg by mouth two (2) times a day., Historical Med      traMADoL (ULTRAM) 50 mg tablet Take 50 mg by mouth every six (6) hours as needed for Pain., Historical Med      atorvastatin (LIPITOR) 40 mg tablet Take 40 mg by mouth daily. , Historical Med      levothyroxine (SYNTHROID) 75 mcg tablet Take 75 mcg by mouth Daily (before breakfast). , Historical Med      co-enzyme Q-10 (COQ-10) 100 mg capsule Take 100 mg by mouth daily. , Historical Med         STOP taking these medications       carvediloL (COREG) 25 mg tablet Comments:   Reason for Stopping:         hydroCHLOROthiazide (HYDRODIURIL) 25 mg tablet Comments:   Reason for Stopping:         losartan (COZAAR) 100 mg tablet Comments:   Reason for Stopping:         lisinopriL (PRINIVIL, ZESTRIL) 20 mg tablet Comments:   Reason for Stopping:         furosemide (Lasix) 20 mg tablet Comments:   Reason for Stopping:         metoprolol tartrate (LOPRESSOR) 50 mg tablet Comments:   Reason for Stopping:                 NOTIFY YOUR PHYSICIAN FOR ANY OF THE FOLLOWING:   Fever over 101 degrees for 24 hours. Chest pain, shortness of breath, fever, chills, nausea, vomiting, diarrhea, change in mentation, falling, weakness, bleeding.  Severe pain or pain not relieved by medications. Or, any other signs or symptoms that you may have questions about. DISPOSITION:   x Home With:   OT x PT x HH  RN       Long term SNF/Inpatient Rehab    Independent/assisted living    Hospice    Other:       PATIENT CONDITION AT DISCHARGE:     Functional status    Poor     Deconditioned    x Independent      Cognition    x Lucid     Forgetful     Dementia      Catheters/lines (plus indication)    [place]     PICC     PEG    x None      Code status   x     DNR      PHYSICAL EXAMINATION AT DISCHARGE:  General:          Alert, cooperative, no distress, appears stated age. HEENT:           Atraumatic, anicteric sclerae, pink conjunctivae                          No oral ulcers, mucosa moist, throat clear, dentition fair  Neck:               Supple, symmetrical  Lungs:             Clear to auscultation bilaterally. No Wheezing or Rhonchi. No rales. Chest wall:      No tenderness  No Accessory muscle use. Heart:              Regular  rhythm,  No  murmur   No edema  Abdomen:        Soft, non-tender. Not distended. Bowel sounds normal  Extremities:     No cyanosis. No clubbing,                            Skin turgor normal, Capillary refill normal  Skin:                Not pale. Not Jaundiced  No rashes   Psych:             Not anxious or agitated.   Neurologic:      Alert, moves all extremities, answers questions appropriately and responds to commands       CHRONIC MEDICAL DIAGNOSES:  Problem List as of 12/29/2021 Date Reviewed: 12/20/2021          Codes Class Noted - Resolved    Hypertensive emergency ICD-10-CM: I16.1  ICD-9-CM: 401.9  12/20/2021 - Present        Palpitations ICD-10-CM: R00.2  ICD-9-CM: 785.1  12/19/2021 - Present        Chest pain ICD-10-CM: R07.9  ICD-9-CM: 786.50  12/19/2021 - Present        LBBB (left bundle branch block) ICD-10-CM: I44.7  ICD-9-CM: 426.3  4/8/2019 - Present        Acute chest pain ICD-10-CM: R07.9  ICD-9-CM: 786.50  4/8/2019 - Present        Uncontrolled hypertension ICD-10-CM: I10  ICD-9-CM: 401.9  4/8/2019 - Present        RESOLVED: Chest pain ICD-10-CM: R07.9  ICD-9-CM: 786.50  4/8/2019 - 8/3/2021              Greater than 30 minutes were spent with the patient on counseling and coordination of care    Signed:    Kiara Vega MD  12/29/2021  3:24 PM

## 2021-12-29 NOTE — PROGRESS NOTES
Attempted to schedule hospital follow up PCP appointment. Office nurse will contact the patient with appointment information as there are no available appointments within the required time frames.   Heather Novoa, Care Management Specialist.

## 2021-12-29 NOTE — PROGRESS NOTES
Transitions of Care Plan   RUR: 9% - low   Clinical Update: orthostatic; cleared for d/c; med management    Consults: Cardiology; Therapy   Baseline: independent without DME; resides with ; good family support   Barriers to Discharge: none   Disposition: home with home health PT (ordered; referrals sent - [employer] and patient resides in [place], [place])  Neda Estimated Discharge Date: 12/29/21    CM spoke with attending MD. Patient medically stable for hospital discharge today. Attending MD placed HHPT orders. Disposition:  Home Health: referrals with [facility] for [place] She[facility]: family    Medicare pt has received, reviewed, and signed 2nd IM letter informing them of their right to appeal the discharge. Signed copied has been placed on pt bedside chart.     José Manuel Mcknight, MPH  Care Manager l [address]  Available via [facility] or

## 2021-12-29 NOTE — PROGRESS NOTES
Cardiology Progress Note                                        Admit Date: 12/19/2021    Assessment/Plan:     1. WCT :   most recent event irreg irregular most consistent with Paroxysmal afib with aberancy . echo normal lvef    2. 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 Hx PAF :  Resume eliquis was med PTA     Amiodarone started   4. Uncontrolled htn :  Renal study to eval for LETICIA   ·   Consistent with borderline renal parenchymal disease in the left kidney. · No evidence of hemodynamically significant stenosis of the renal arteries or superior mesenteric artery. 5. Non obstructive cad 4.2019   6. Elevated troponin:    lexiscan MPI     Normal gated rest/stress myocardial perfusion imaging study. No evidence of myocardial ischemia or infarction. Left ventricular ejection fraction is 65 %    7. Back pain   8. Hypotension with bradycardial:  Symptomatic  tolerating Short acting 40 tid  All bp should be recorded with standing as this will be the bp we will RX to avoid recurrent orthostasis   9. Hyponatremia  Secondary to hctz. Na 131                 Sonia Rai is a 80 y.o. female with      PROBLEM LIST:  Patient Active Problem List    Diagnosis Date Noted    Hypertensive emergency 12/20/2021    Palpitations 12/19/2021    Chest pain 12/19/2021    LBBB (left bundle branch block) 04/08/2019    Acute chest pain 04/08/2019    Uncontrolled hypertension 04/08/2019         Subjective:     Sonia Rai denies chest pain. Visit Vitals  BP (!) 187/80   Pulse 66   Temp 97.8 °F (36.6 °C)   Resp 18   Ht 5' 8\" (1.727 m)   Wt 207 lb 10.8 oz (94.2 kg)   SpO2 99%   BMI 31.58 kg/m²       Intake/Output Summary (Last 24 hours) at 12/29/2021 0755  Last data filed at 12/28/2021 1526  Gross per 24 hour   Intake 720 ml   Output    Net 720 ml       Objective:      Physical Exam:  HEENT: Perrla, EOMI  Neck: No JVD,  No thyroidmegaly  Resp: CTA bilaterally;  No wheezes or rales  CV: RRR s1s2 No murmur no s3  Abd:Soft, Nontender  Ext: No edema  Neuro: Alert and oriented; Nonfocal  Skin: Warm, Dry, Intact  Pulses: 2+ DP/PT/Rad      Telemetry: normal sinus rhythm    Current Facility-Administered Medications   Medication Dose Route Frequency    verapamiL (CALAN) tablet 40 mg  40 mg Oral TID    methocarbamoL (ROBAXIN) injection 500 mg  500 mg IntraVENous Q8H PRN    amiodarone (CORDARONE) tablet 200 mg  200 mg Oral DAILY    cyclobenzaprine (FLEXERIL) tablet 5 mg  5 mg Oral TID PRN    cyclobenzaprine (FLEXERIL) tablet 10 mg  10 mg Oral TID PRN    morphine injection 1 mg  1 mg IntraVENous Q3H PRN    apixaban (ELIQUIS) tablet 5 mg  5 mg Oral BID    levothyroxine (SYNTHROID) tablet 75 mcg  75 mcg Oral 6am    nitroglycerin (NITROSTAT) tablet 0.4 mg  0.4 mg SubLINGual PRN    atorvastatin (LIPITOR) tablet 40 mg  40 mg Oral QHS    pantoprazole (PROTONIX) tablet 40 mg  40 mg Oral ACB&D    traMADoL (ULTRAM) tablet 50 mg  50 mg Oral Q4H PRN    sodium chloride (NS) flush 5-40 mL  5-40 mL IntraVENous Q8H    sodium chloride (NS) flush 5-40 mL  5-40 mL IntraVENous PRN    acetaminophen (TYLENOL) tablet 650 mg  650 mg Oral Q6H PRN    Or    acetaminophen (TYLENOL) suppository 650 mg  650 mg Rectal Q6H PRN    polyethylene glycol (MIRALAX) packet 17 g  17 g Oral DAILY PRN    ondansetron (ZOFRAN ODT) tablet 4 mg  4 mg Oral Q8H PRN    Or    ondansetron (ZOFRAN) injection 4 mg  4 mg IntraVENous Q4H PRN         Data Review:   Labs:    Recent Results (from the past 24 hour(s))   RENAL FUNCTION PANEL    Collection Time: 12/29/21  5:16 AM   Result Value Ref Range    Sodium 131 (L) 136 - 145 mmol/L    Potassium 4.1 3.5 - 5.1 mmol/L    Chloride 98 97 - 108 mmol/L    CO2 26 21 - 32 mmol/L    Anion gap 7 5 - 15 mmol/L    Glucose 91 65 - 100 mg/dL    BUN 12 6 - 20 MG/DL    Creatinine 0.86 0.55 - 1.02 MG/DL    BUN/Creatinine ratio 14 12 - 20      GFR est AA >60 >60 ml/min/1.73m2    GFR est non-AA >60 >60 ml/min/1.73m2 Calcium 8.7 8.5 - 10.1 MG/DL    Phosphorus 3.0 2.6 - 4.7 MG/DL    Albumin 3.0 (L) 3.5 - 5.0 g/dL

## 2021-12-29 NOTE — DISCHARGE INSTRUCTIONS
Above Dear Patty Marin,    Thank you for choosing [address] [place] [address] for your healthcare needs. We strive to provide EXCELLENT care to you and your family. In an effort to explain clearly why you were here in the hospital, I've also written a very brief summary below. Other details including formal diagnosis, medication changes, and follow up appointment recommendations can also be found in this packet. You were admitted for chest pain due to elevated blood pressure for which you were started on IV blood pressure medications and had a stress test done. He had episode of unresponsiveness on 12/25/2021 which was felt to be secondary to dropping her blood pressure (orthostatic hypotension). You were evaluated by neurologist.  Bing Goldberg had low sodium which is felt to be due to the fluid pill, hydrochlorothiazide which is discontinued. Your blood pressure continues to fluctuate being very high when you are supine (lying) and dropping significantly when you are upright (standing). The carvedilol and losartan that you were started new during this hospitalization were discontinued. Your cardiologist recommended verapamil 40 mg 3 times daily and advised for you to use your standing blood pressure as a guide to using your blood pressure medicine. Please continue to monitor your blood pressure and notify your providers/cardiologist if your blood pressures goes below 100/60 and you feel dizzy or lightheaded or if your blood pressure goes above 180/100. IP CONSULT TO CARDIOLOGY  IP CONSULT TO NEUROLOGY  IP CONSULT TO NEPHROLOGY  IP CONSULT TO NEPHROLOGY      Here are the updates to your medication list:  **STOP metoprolol,  stop lisinopril,stop furosemide   **You are started on verapamil 40 mg 3 times daily  **You are started on antiarrhythmic medication, amiodarone. Remember that it is important for you to take your medications exactly as they are prescribed.  It is helpful to keep a list of your medication with the names, dosages, and times to be taken in your wallet. Additionally,   - Please make sure to follow up with your primary care physician within 1-2 weeks of discharge for hospital follow up, you will need follow-up labs to check for your sodium and electrolytes and kidney function. You should also follow up with Dr. Markus Healy  - Please make sure to continue to monitor for: Chest pain, shortness of breath, high fevers, and return to the Emergency Department with any of these symptoms.   - Please get up slowly from a seated or laying position, avoid falls. - Avoid tobacco, alcohol and other illicit drug use. - Diet restrictions: Low salt   - Activity restrictions: None    Make sure to also see your primary care doctor for follow-up. Bring these papers with you and be sure to review your medication list with your doctor. I cannot stress the importance of follow up enough. I've included the information for your follow-up appointments below: Follow-up Information     Follow up With Specialties Details Why Contact Info    Grayson Colin., MD [facility] Medicine Call   [address] [facility]  [address]      Reginaldo Hernandez MD Cardio Vascular Surgery, Clinical Cardiac Electrophysiology Call in 1 week For hospital follow up, and echocardiogram  [address] [facility],[address]  [phone]          Should you have any fever over 101 degrees for 24 hours, chest pain, shortness of breath, fever, chills, nausea, vomiting, diarrhea, change in mentation, falling, weakness, bleeding, or worsening pain, please seek medical attention immediately. Respectfully yours,  Reginaldo Hernandez MD     My Medications      START taking these medications      Instructions Each Dose to Equal Morning Noon Evening Bedtime   amiodarone 200 mg tablet  Commonly known as: CORDARONE    Your last dose was: Your next dose is:          Take 1 Tablet by mouth daily for 30 days.   200 mg                 pantoprazole 40 mg tablet  Commonly known as: PROTONIX    Your last dose was: Your next dose is: Take 1 Tablet by mouth Before breakfast and dinner for 14 days. 40 mg                 verapamiL 40 mg tablet  Commonly known as: CALAN    Your last dose was: Your next dose is: Take 1 Tablet by mouth three (3) times daily for 30 days. 40 mg                    CONTINUE taking these medications      Instructions Each Dose to Equal Morning Noon Evening Bedtime   atorvastatin 40 mg tablet  Commonly known as: LIPITOR    Your last dose was: Your next dose is: Take 40 mg by mouth daily. 40 mg                 CoQ-10 100 mg capsule  Generic drug: co-enzyme Q-10    Your last dose was: Your next dose is: Take 100 mg by mouth daily. 100 mg                 Eliquis 5 mg tablet  Generic drug: apixaban    Your last dose was: Your next dose is: Take 5 mg by mouth two (2) times a day. 5 mg                 levothyroxine 75 mcg tablet  Commonly known as: SYNTHROID    Your last dose was: Your next dose is: Take 75 mcg by mouth Daily (before breakfast). 75 mcg                 traMADoL 50 mg tablet  Commonly known as: ULTRAM    Your last dose was: Your next dose is: Take 50 mg by mouth every six (6) hours as needed for Pain.    50 mg                    STOP taking these medications    furosemide 20 mg tablet  Commonly known as: Lasix        lisinopriL 20 mg tablet  Commonly known as: PRINIVIL, ZESTRIL        metoprolol tartrate 50 mg tablet  Commonly known as: LOPRESSOR              Where to Get Your Medications      [facility] , [address] [place] [id number]    Phone: [phone]   · amiodarone 200 mg tablet  · pantoprazole 40 mg tablet  · verapamiL 40 mg tablet

## 2021-12-29 NOTE — PROGRESS NOTES
Neurology Progress Note     NAME: Johnny Fischer   :  1940   MRN:  [id number]   DATE:  2021    Assessment:     Active Problems:    Palpitations (2021)      Chest pain (2021)      Hypertensive emergency (2021)      Pt is a 73yo RH female with labile HTN and known h/o hypotension with bradycardia with episode of syncope while sitting in a chair without measurable BP until supine at which time it was 80/40, decreased from 220/140's, described as stiff as she was being moved to the bed with bladder incontinence. Immediately after the event had a normal neuro exam despite CTP with perfusion mismatch in the right MCA distribution of unclear etiology or significance with normal vasculature seen on her CTA. MRI brain is normal.  No further events. Orthostatic hypotension. Possible post-syncopal seizure. Plan:   No further recommendations, please call if needed. Subjective:    No additional spells. No neurological complaints. Hopes to go home tomorrow.      Objective:   Chart reviewed since last seen    Current Facility-Administered Medications   Medication Dose Route Frequency    verapamiL (CALAN) tablet 40 mg  40 mg Oral TID    methocarbamoL (ROBAXIN) injection 500 mg  500 mg IntraVENous Q8H PRN    amiodarone (CORDARONE) tablet 200 mg  200 mg Oral DAILY    cyclobenzaprine (FLEXERIL) tablet 5 mg  5 mg Oral TID PRN    cyclobenzaprine (FLEXERIL) tablet 10 mg  10 mg Oral TID PRN    morphine injection 1 mg  1 mg IntraVENous Q3H PRN    apixaban (ELIQUIS) tablet 5 mg  5 mg Oral BID    levothyroxine (SYNTHROID) tablet 75 mcg  75 mcg Oral 6am    nitroglycerin (NITROSTAT) tablet 0.4 mg  0.4 mg SubLINGual PRN    atorvastatin (LIPITOR) tablet 40 mg  40 mg Oral QHS    pantoprazole (PROTONIX) tablet 40 mg  40 mg Oral ACB&D    traMADoL (ULTRAM) tablet 50 mg  50 mg Oral Q4H PRN    sodium chloride (NS) flush 5-40 mL  5-40 mL IntraVENous Q8H    sodium chloride (NS) flush 5-40 mL  5-40 mL IntraVENous PRN    acetaminophen (TYLENOL) tablet 650 mg  650 mg Oral Q6H PRN    Or    acetaminophen (TYLENOL) suppository 650 mg  650 mg Rectal Q6H PRN    polyethylene glycol (MIRALAX) packet 17 g  17 g Oral DAILY PRN    ondansetron (ZOFRAN ODT) tablet 4 mg  4 mg Oral Q8H PRN    Or    ondansetron (ZOFRAN) injection 4 mg  4 mg IntraVENous Q4H PRN       Visit Vitals  BP (!) 146/56   Pulse 60   Temp 98.5 °F (36.9 °C)   Resp 20   Ht 5' 8\" (1.727 m)   Wt 209 lb 7 oz (95 kg)   SpO2 99%   BMI 31.84 kg/m²     Temp (24hrs), Av.9 °F (36.6 °C), Min:97.7 °F (36.5 °C), Max:98.5 °F (36.9 °C)      No intake/output data recorded.  0701 -  1900  In: 1140 [P.O.:1140]  Out: 775 [Urine:775]      Physical Exam:  General: Well developed well nourished patient in no apparent distress. Neurological Exam:  Mental Status: Oriented to time, place and person. Speech and language intact. Attention and fund of knowledge appropriate. Normal recent and remote memory. Cranial Nerves:   EOMI, no nystagmus, no ptosis. Facial movement is symmetric. Herson Dailey  Hearing is intact   Motor:     Reflexes:      Sensory:      Gait:     Cerebellar:           Lab Review   Recent Results (from the past 24 hour(s))   RENAL FUNCTION PANEL    Collection Time: 21  4:26 AM   Result Value Ref Range    Sodium 131 (L) 136 - 145 mmol/L    Potassium 4.3 3.5 - 5.1 mmol/L    Chloride 100 97 - 108 mmol/L    CO2 29 21 - 32 mmol/L    Anion gap 2 (L) 5 - 15 mmol/L    Glucose 98 65 - 100 mg/dL    BUN 13 6 - 20 MG/DL    Creatinine 0.92 0.55 - 1.02 MG/DL    BUN/Creatinine ratio 14 12 - 20      GFR est AA >60 >60 ml/min/1.73m2    GFR est non-AA 59 (L) >60 ml/min/1.73m2    Calcium 8.9 8.5 - 10.1 MG/DL    Phosphorus 2.7 2.6 - 4.7 MG/DL    Albumin 3.3 (L) 3.5 - 5.0 g/dL       Additional comments:  I have reviewed the patient's new clinical lab test results. I have personally reviewed the patient's radiographs. MRI   MRI Results (most recent):  Results from [place] encounter on 12/19/21    MRI BRAIN WO CONT    Narrative  INDICATION: Acute CVA, seizure. Chest pain, elevated blood pressure. Exam: Multiplanar noncontrast MRI of the brain is performed with T1, T2,  gradient, FLAIR and diffusion sequences. Direct comparison is made to prior CT\CTA dated December 25, 2021. FINDINGS: There is age-appropriate diffuse cortical atrophy. There is no  restricted diffusion to suggest acute infarct. The ventricular system is normal.  There are bilateral periventricular white matter hyperintensities consistent  with chronic microvascular ischemic changes. The cervicomedullary junction is  normal and there is no tonsillar ectopia. There is no acute intracranial  hemorrhage, prior intracranial hemorrhage or extra-axial fluid collection. The  visualized vascular flow-voids of the skull base are normal. There is no  intracranial mass lesion, mass effect or herniation. Impression  No acute intracranial hemorrhage or infarct. CT Results (most recent):  Results from Hospital Encounter encounter on 12/19/21    CT CODE NEURO PERF W CBF    Narrative  EXAM: CT CODE NEURO PERF W CBF    INDICATION: code stroke    COMPARISON: None. CONTRAST: 50 mL of Isovue-370. TECHNIQUE:  CT brain perfusion was performed with generation of hemodynamic maps  of multiple parameters, including cerebral blood flow, cerebral blood volume,  and MTT (mean transit time). CT dose reduction was achieved through use of a  standardized protocol tailored for this examination and automatic exposure  control for dose modulation. FINDINGS:    There is slight asymmetry in the appearance of cerebral blood flow and volume in  the right MCA territory measuring approximately 12 cc.  There is diminished  perfusion in a large area of the right middle cerebral artery territory with  estimated mismatch volume of 141 cc. Impression  Large right MCA territory perfusion mismatch. .          Care Plan discussed with:  Patient x   Family    RN    Care Manager    Consultant/Specialist:       Signed: Francheska Vera MD

## 2021-12-29 NOTE — PROGRESS NOTES
0730: Bedside and Verbal shift change report given to [address] [place] [address] (oncoming nurse) by Antonio Nguyen (offgoing nurse). Report included the following information SBAR, Kardex, Intake/Output, MAR, Accordion and Recent Results. 1930: Bedside and Verbal shift change report given to THAI Briggs (oncoming nurse) by [address] [place] [address] (offgoing nurse). Report included the following information SBAR, Kardex, Intake/Output, MAR, Accordion, Recent Results and Cardiac Rhythm nsr.

## 2021-12-29 NOTE — PROGRESS NOTES
RN report given to day shift. Progress note for shift:    Patient continued to remain the same as previous, no significant changes. Patient states \"I'm ready to go home\". Denies feeling dizzy or light headed with the orthostatic blood pressure change. VS stable according to doctor during morning rounds.

## 2021-12-29 NOTE — PROGRESS NOTES
0730: Bedside and Verbal shift change report given to [address] [place] [address] Avenue (oncoming nurse) by Elba Gutiérrez (offgoing nurse). Report included the following information SBAR, Kardex, Intake/Output, MAR, Accordion, Recent Results and Cardiac Rhythm nsr.     4344: Discharge medications reviewed with patient and son and appropriate educational materials and side effects teaching were provided. I have reviewed discharge instructions with the patient and son. The patient and son verbalized understanding and have no further questions at this time.

## 2021-12-29 NOTE — PROGRESS NOTES
[facility]   [address] [facility], [id number] [place] [address]  Phone: [phone]   [phone]    [email or web address]     Nephrology Progress Note    Patient Name : Patty Marin      : 1940     MRN : [id number]  Date of Admission : 2021  Date of Servive : 21    CC: Follow up for Hyponatremia       Assessment and Plan   Acute Hyponatremia :  - 2/2 combination of HCTZ and aldactone  - Urine chemistries consistent w/ above picture   - Na stable  - cont current care  - daily Na levels while here     Supine HTN   Orthostatic Hypotension :  - Renal duplex : No LETICIA  - mx per cards      PAF:  - on amiodarone    Lumbar spinal stenosis      Hypothyroidism      Mild CKD : based on imaging   - Cr stable     Interval History:  Seen and examined. Feeling better. Standing BPs improving. No cp, sob, n/v/d. Na stable at 131. Review of Systems: A comprehensive review of systems was negative except for that written in the HPI.     Current Medications:   Current Facility-Administered Medications   Medication Dose Route Frequency    verapamiL (CALAN) tablet 40 mg  40 mg Oral TID    methocarbamoL (ROBAXIN) injection 500 mg  500 mg IntraVENous Q8H PRN    amiodarone (CORDARONE) tablet 200 mg  200 mg Oral DAILY    cyclobenzaprine (FLEXERIL) tablet 5 mg  5 mg Oral TID PRN    cyclobenzaprine (FLEXERIL) tablet 10 mg  10 mg Oral TID PRN    morphine injection 1 mg  1 mg IntraVENous Q3H PRN    apixaban (ELIQUIS) tablet 5 mg  5 mg Oral BID    levothyroxine (SYNTHROID) tablet 75 mcg  75 mcg Oral 6am    nitroglycerin (NITROSTAT) tablet 0.4 mg  0.4 mg SubLINGual PRN    atorvastatin (LIPITOR) tablet 40 mg  40 mg Oral QHS    pantoprazole (PROTONIX) tablet 40 mg  40 mg Oral ACB&D    traMADoL (ULTRAM) tablet 50 mg  50 mg Oral Q4H PRN    sodium chloride (NS) flush 5-40 mL  5-40 mL IntraVENous Q8H    sodium chloride (NS) flush 5-40 mL  5-40 mL IntraVENous PRN    acetaminophen (TYLENOL) tablet 650 mg  650 mg Oral Q6H PRN    Or    acetaminophen (TYLENOL) suppository 650 mg  650 mg Rectal Q6H PRN    polyethylene glycol (MIRALAX) packet 17 g  17 g Oral DAILY PRN    ondansetron (ZOFRAN ODT) tablet 4 mg  4 mg Oral Q8H PRN    Or    ondansetron (ZOFRAN) injection 4 mg  4 mg IntraVENous Q4H PRN      No Known Allergies    Objective:  Vitals:    Vitals:    12/29/21 0403 12/29/21 0424 12/29/21 0554 12/29/21 0806   BP:  (!) 187/80  (!) 187/84   Pulse:  (!) 59 66 66   Resp:    20   Temp:  97.8 °F (36.6 °C)  98.4 °F (36.9 °C)   SpO2:    100%   Weight: 94.2 kg (207 lb 10.8 oz)      Height:         Intake and Output:  No intake/output data recorded. 12/27 1901 - 12/29 0700  In: 900 [P.O.:900]  Out: 275 [Urine:275]    Physical Examination:  General: NAD,Conversant   Neck:  Supple, no mass  Resp:  Lungs CTA B/L, no wheezing , normal respiratory effort  CV:  RRR,  no murmur or rub, LE edema  GI:  Soft, NT, + BS, no HS megaly  Neurologic:  Non focal  Psych:             AAO x 3 appropriate affect       []    High complexity decision making was performed  []    Patient is at high-risk of decompensation with multiple organ involvement    Lab Data Personally Reviewed: I have reviewed all the pertinent labs, microbiology data and radiology studies during assessment. Recent Labs     12/29/21  0516 12/28/21  0426 12/27/21  1833 12/27/21  0356 12/26/21  2143   * 131* 130* 130* 129*   K 4.1 4.3 4.2 3.8  --    CL 98 100 100 100  --    CO2 26 29 24 25  --    GLU 91 98 137* 103*  --    BUN 12 13 15 16  --    CREA 0.86 0.92 1.01 1.05*  --    CA 8.7 8.9 9.0 8.6  --    PHOS 3.0 2.7 2.7  --   --    ALB 3.0* 3.3* 3.4*  --   --      No results for input(s): WBC, HGB, HCT, PLT, HGBEXT, HCTEXT, PLTEXT, HGBEXT, HCTEXT, PLTEXT in the last 72 hours.   No results found for: SDES  No results found for: CULT  Recent Results (from the past 24 hour(s))   RENAL FUNCTION PANEL    Collection Time: 12/29/21  5:16 AM   Result Value Ref Range    Sodium 131 (L) 136 - 145 mmol/L    Potassium 4.1 3.5 - 5.1 mmol/L    Chloride 98 97 - 108 mmol/L    CO2 26 21 - 32 mmol/L    Anion gap 7 5 - 15 mmol/L    Glucose 91 65 - 100 mg/dL    BUN 12 6 - 20 MG/DL    Creatinine 0.86 0.55 - 1.02 MG/DL    BUN/Creatinine ratio 14 12 - 20      GFR est AA >60 >60 ml/min/1.73m2    GFR est non-AA >60 >60 ml/min/1.73m2    Calcium 8.7 8.5 - 10.1 MG/DL    Phosphorus 3.0 2.6 - 4.7 MG/DL    Albumin 3.0 (L) 3.5 - 5.0 g/dL           I have reviewed the flowsheets. Chart and Pertinent Notes have been reviewed. No change in PMH ,family and social history from Consult note.       Rajwinder Goldstein MD  [facility]

## 2021-12-29 NOTE — PROGRESS NOTES
Problem: Mobility Impaired (Adult and Pediatric)  Goal: *Acute Goals and Plan of Care (Insert Text)  Description: FUNCTIONAL STATUS PRIOR TO ADMISSION: Patient was independent without use of DME.    HOME SUPPORT PRIOR TO ADMISSION: The patient lived with spouse but did not require assist.    Physical Therapy Goals  Initiated 12/23/2021  1. Patient will move from supine to sit and sit to supine , scoot up and down, and roll side to side in bed with modified independence within 7 day(s). 2.  Patient will transfer from bed to chair and chair to bed with modified independence using the least restrictive device within 7 day(s). 3.  Patient will perform sit to stand with modified independence within 7 day(s). 4.  Patient will ambulate with independence for 150 feet with the least restrictive device within 7 day(s). 5.  Patient will ascend/descend 8 stairs with handrail(s) with modified independence within 7 day(s). Outcome: Progressing Towards Goal    PHYSICAL THERAPY TREATMENT  Patient: Hal Litten (89 y.o. female)  Date: 12/29/2021  Diagnosis: Chest pain [R07.9]  Palpitations [R00.2]  Hypertensive emergency [I16.1] <principal problem not specified>       Precautions: Fall  Chart, physical therapy assessment, plan of care and goals were reviewed. ASSESSMENT  Patient continues with skilled PT services and is progressing towards goals. Pt agreeable to therapy. Pt does have decrease in BP with positional change. Pt reports \"minimal dizziness. \" BP improved with time and activity . Pt was able to ambulate with rolling walker due to stiffness.       Vitals:    12/29/21 1221 12/29/21 1225 12/29/21 1230 12/29/21 1236   BP: 110/73 (!) 75/52 (!) 93/50 (!) 101/58   BP 1 Location:       BP Patient Position: Sitting Standing Standing Standing;Walking   Pulse: 68 79 87 73   Temp:       Resp:       Height:       Weight:       SpO2:          Current Level of Function Impacting Discharge (mobility/balance): CGA    Other factors to consider for discharge: BP         PLAN :  Patient continues to benefit from skilled intervention to address the above impairments. Continue treatment per established plan of care. to address goals. Recommendation for discharge: (in order for the patient to meet his/her long term goals)  Physical therapy at least 2 days/week in the home     This discharge recommendation:  Has not yet been discussed the attending provider and/or case management    IF patient discharges home will need the following DME: patient owns DME required for discharge       SUBJECTIVE:   Patient stated Did I pass.     OBJECTIVE DATA SUMMARY:   Critical Behavior:  Neurologic State: Alert  Orientation Level: Oriented X4  Cognition: Appropriate decision making,Appropriate for age attention/concentration,Appropriate safety awareness,Follows commands  Safety/Judgement: Awareness of environment  Functional Mobility Training:  Bed Mobility:     Supine to Sit: Supervision              Transfers:  Sit to Stand: Supervision  Stand to Sit: Supervision                             Balance:  Sitting: Intact  Standing: Impaired  Standing - Static: Good  Standing - Dynamic : Good  Ambulation/Gait Training:  Distance (ft): 120 Feet (ft)  Assistive Device: Gait belt;Walker, rolling  Ambulation - Level of Assistance: Stand-by assistance;Contact guard assistance        Gait Abnormalities: Decreased step clearance        Base of Support: Widened        Step Length: Left shortened;Right shortened                   Stairs: Therapeutic Exercises:     Pain Rating:  No complaints     Activity Tolerance:   Hypotensive     After treatment patient left in no apparent distress:   Sitting in chair and Call bell within reach    COMMUNICATION/COLLABORATION:   The patients plan of care was discussed with: Registered nurse.      Avelino Han PTA   Time Calculation: 28 mins

## 2022-01-31 ENCOUNTER — TRANSCRIBE ORDER (OUTPATIENT)
Dept: SCHEDULING | Age: 82
End: 2022-01-31

## 2022-01-31 DIAGNOSIS — I48.0 PAROXYSMAL ATRIAL FIBRILLATION (HCC): ICD-10-CM

## 2022-01-31 DIAGNOSIS — I47.29 PAROXYSMAL VENTRICULAR TACHYCARDIA: Primary | ICD-10-CM

## 2022-03-23 ENCOUNTER — HOSPITAL ENCOUNTER (OUTPATIENT)
Dept: MRI IMAGING | Age: 82
Discharge: HOME | End: 2022-03-23
Attending: INTERNAL MEDICINE
Payer: MEDICARE

## 2022-03-23 VITALS — BODY MASS INDEX: 31.47 KG/M2 | WEIGHT: 207 LBS

## 2022-03-23 DIAGNOSIS — I47.29 PAROXYSMAL VENTRICULAR TACHYCARDIA: ICD-10-CM

## 2022-03-23 DIAGNOSIS — I48.0 PAROXYSMAL ATRIAL FIBRILLATION (HCC): ICD-10-CM

## 2022-03-23 PROCEDURE — A9576 INJ PROHANCE MULTIPACK: HCPCS | Performed by: INTERNAL MEDICINE

## 2022-03-23 PROCEDURE — 77030021566

## 2022-03-23 PROCEDURE — 74011250636 HC RX REV CODE- 250/636: Performed by: INTERNAL MEDICINE

## 2022-03-23 PROCEDURE — 75561 CARDIAC MRI FOR MORPH W/DYE: CPT

## 2022-03-23 RX ADMIN — GADOTERIDOL 28 ML: 279.3 INJECTION, SOLUTION INTRAVENOUS at 08:28

## 2022-05-31 ENCOUNTER — TRANSCRIBE ORDER (OUTPATIENT)
Dept: GENERAL RADIOLOGY | Age: 82
End: 2022-05-31

## 2022-05-31 DIAGNOSIS — Z12.31 OTHER SCREENING MAMMOGRAM: Primary | ICD-10-CM

## 2022-06-01 ENCOUNTER — HOSPITAL ENCOUNTER (OUTPATIENT)
Dept: MAMMOGRAPHY | Age: 82
Discharge: HOME | End: 2022-06-01
Payer: MEDICARE

## 2022-06-01 DIAGNOSIS — Z12.31 OTHER SCREENING MAMMOGRAM: ICD-10-CM

## 2022-06-01 PROCEDURE — 77063 BREAST TOMOSYNTHESIS BI: CPT

## 2022-09-02 ENCOUNTER — APPOINTMENT (OUTPATIENT)
Dept: CT IMAGING | Age: 82
End: 2022-09-02
Attending: EMERGENCY MEDICINE
Payer: MEDICARE

## 2022-09-02 ENCOUNTER — APPOINTMENT (OUTPATIENT)
Dept: GENERAL RADIOLOGY | Age: 82
End: 2022-09-02
Attending: EMERGENCY MEDICINE
Payer: MEDICARE

## 2022-09-02 ENCOUNTER — HOSPITAL ENCOUNTER (OUTPATIENT)
Age: 82
Setting detail: OBSERVATION
Discharge: HOME | End: 2022-09-04
Attending: EMERGENCY MEDICINE | Admitting: FAMILY MEDICINE
Payer: MEDICARE

## 2022-09-02 DIAGNOSIS — I10 UNCONTROLLED HYPERTENSION: ICD-10-CM

## 2022-09-02 DIAGNOSIS — R07.9 CHEST PAIN, UNSPECIFIED TYPE: Primary | ICD-10-CM

## 2022-09-02 LAB
ANION GAP SERPL CALC-SCNC: 10 MMOL/L (ref 5–15)
BASOPHILS # BLD: 0 K/UL (ref 0–0.1)
BASOPHILS NFR BLD: 0 % (ref 0–1)
BUN SERPL-MCNC: 28 MG/DL (ref 6–20)
BUN/CREAT SERPL: 24 (ref 12–20)
CALCIUM SERPL-MCNC: 8.8 MG/DL (ref 8.5–10.1)
CHLORIDE SERPL-SCNC: 96 MMOL/L (ref 97–108)
CO2 SERPL-SCNC: 29 MMOL/L (ref 21–32)
CREAT SERPL-MCNC: 1.17 MG/DL (ref 0.55–1.02)
DIFFERENTIAL METHOD BLD: ABNORMAL
EOSINOPHIL # BLD: 0 K/UL (ref 0–0.4)
EOSINOPHIL NFR BLD: 0 % (ref 0–7)
ERYTHROCYTE [DISTWIDTH] IN BLOOD BY AUTOMATED COUNT: 13.9 % (ref 11.5–14.5)
GLUCOSE SERPL-MCNC: 99 MG/DL (ref 65–100)
HCT VFR BLD AUTO: 39 % (ref 35–47)
HGB BLD-MCNC: 12.7 G/DL (ref 11.5–16)
IMM GRANULOCYTES # BLD AUTO: 0.1 K/UL (ref 0–0.04)
IMM GRANULOCYTES NFR BLD AUTO: 1 % (ref 0–0.5)
LYMPHOCYTES # BLD: 1.3 K/UL (ref 0.8–3.5)
LYMPHOCYTES NFR BLD: 12 % (ref 12–49)
MCH RBC QN AUTO: 31.2 PG (ref 26–34)
MCHC RBC AUTO-ENTMCNC: 32.6 G/DL (ref 30–36.5)
MCV RBC AUTO: 95.8 FL (ref 80–99)
MONOCYTES # BLD: 1.3 K/UL (ref 0–1)
MONOCYTES NFR BLD: 12 % (ref 5–13)
NEUTS SEG # BLD: 7.7 K/UL (ref 1.8–8)
NEUTS SEG NFR BLD: 75 % (ref 32–75)
NRBC # BLD: 0 K/UL (ref 0–0.01)
NRBC BLD-RTO: 0 PER 100 WBC
PLATELET # BLD AUTO: 272 K/UL (ref 150–400)
PMV BLD AUTO: 8.3 FL (ref 8.9–12.9)
POTASSIUM SERPL-SCNC: 4.6 MMOL/L (ref 3.5–5.1)
RBC # BLD AUTO: 4.07 M/UL (ref 3.8–5.2)
SODIUM SERPL-SCNC: 135 MMOL/L (ref 136–145)
TROPONIN-HIGH SENSITIVITY: 17 NG/L (ref 0–51)
TROPONIN-HIGH SENSITIVITY: 27 NG/L (ref 0–51)
WBC # BLD AUTO: 10.4 K/UL (ref 3.6–11)

## 2022-09-02 PROCEDURE — 74011000636 HC RX REV CODE- 636

## 2022-09-02 PROCEDURE — 96374 THER/PROPH/DIAG INJ IV PUSH: CPT

## 2022-09-02 PROCEDURE — 85025 COMPLETE CBC W/AUTO DIFF WBC: CPT

## 2022-09-02 PROCEDURE — 84484 ASSAY OF TROPONIN QUANT: CPT

## 2022-09-02 PROCEDURE — 74011250637 HC RX REV CODE- 250/637: Performed by: EMERGENCY MEDICINE

## 2022-09-02 PROCEDURE — 96376 TX/PRO/DX INJ SAME DRUG ADON: CPT

## 2022-09-02 PROCEDURE — 74011000250 HC RX REV CODE- 250: Performed by: EMERGENCY MEDICINE

## 2022-09-02 PROCEDURE — 93005 ELECTROCARDIOGRAM TRACING: CPT

## 2022-09-02 PROCEDURE — G0378 HOSPITAL OBSERVATION PER HR: HCPCS

## 2022-09-02 PROCEDURE — 71275 CT ANGIOGRAPHY CHEST: CPT

## 2022-09-02 PROCEDURE — 99285 EMERGENCY DEPT VISIT HI MDM: CPT

## 2022-09-02 PROCEDURE — 36415 COLL VENOUS BLD VENIPUNCTURE: CPT

## 2022-09-02 PROCEDURE — 71045 X-RAY EXAM CHEST 1 VIEW: CPT

## 2022-09-02 PROCEDURE — 80048 BASIC METABOLIC PNL TOTAL CA: CPT

## 2022-09-02 RX ORDER — LABETALOL HYDROCHLORIDE 5 MG/ML
20 INJECTION, SOLUTION INTRAVENOUS ONCE
Status: COMPLETED | OUTPATIENT
Start: 2022-09-02 | End: 2022-09-02

## 2022-09-02 RX ORDER — VERAPAMIL HYDROCHLORIDE 40 MG/1
40 TABLET ORAL
COMMUNITY

## 2022-09-02 RX ORDER — GUAIFENESIN 100 MG/5ML
162 LIQUID (ML) ORAL
Status: COMPLETED | OUTPATIENT
Start: 2022-09-02 | End: 2022-09-02

## 2022-09-02 RX ORDER — DOCUSATE SODIUM 100 MG/1
100 CAPSULE, LIQUID FILLED ORAL 2 TIMES DAILY
COMMUNITY

## 2022-09-02 RX ORDER — NITROGLYCERIN 0.4 MG/1
0.4 TABLET SUBLINGUAL
Status: ACTIVE | OUTPATIENT
Start: 2022-09-02 | End: 2022-09-03

## 2022-09-02 RX ORDER — ACETAMINOPHEN 500 MG
1000 TABLET ORAL ONCE
Status: COMPLETED | OUTPATIENT
Start: 2022-09-02 | End: 2022-09-02

## 2022-09-02 RX ORDER — TIZANIDINE HYDROCHLORIDE 4 MG/1
4 CAPSULE, GELATIN COATED ORAL
COMMUNITY

## 2022-09-02 RX ADMIN — LABETALOL HYDROCHLORIDE 20 MG: 5 INJECTION INTRAVENOUS at 16:36

## 2022-09-02 RX ADMIN — IOPAMIDOL 100 ML: 755 INJECTION, SOLUTION INTRAVENOUS at 17:07

## 2022-09-02 RX ADMIN — ACETAMINOPHEN 1000 MG: 500 TABLET, FILM COATED ORAL at 22:03

## 2022-09-02 RX ADMIN — LABETALOL HYDROCHLORIDE 20 MG: 5 INJECTION INTRAVENOUS at 19:33

## 2022-09-02 RX ADMIN — ASPIRIN 81 MG CHEWABLE TABLET 162 MG: 81 TABLET CHEWABLE at 22:03

## 2022-09-02 NOTE — ED PROVIDER NOTES
81F w/ hx AF on eliquis, HTN, HLD p/w chest pain x1d. Pt reports 1d of mid chest pressure that occurred this afternoon while at rest and has been constant. Associated w/ lightheadedness but no vertigo or syncope/LOC. At the time pt noted that her BP was very high and called her PCP who told her to go to ER. Recently started on lisinopril. No dyspnea, N/V, F/C, cough, dyspnea. Denies any hx of coronary stents or cabg. On eliquis for AF. Past Medical History:   Diagnosis Date    Arrhythmia     attempted ablation    Arthritis     GERD (gastroesophageal reflux disease)     High cholesterol     History of seasonal allergies     Hypertension     Sleep apnea     mild-does not use CPAP    Thyroid disease        Past Surgical History:   Procedure Laterality Date    COLONOSCOPY N/A 10/14/2020    COLONOSCOPY    :- performed by Sasha Brady MD at [facility] ENDOSCOPY    HX BREAST BIOPSY Right Years ago    Negative    HX CATARACT REMOVAL      bilateral    HX OTHER SURGICAL      cyst removed from left cheek    WI BREAST SURGERY PROCEDURE UNLISTED      breast biopsy-local - benign    WI CARDIAC SURG PROCEDURE UNLIST      attempted ablation         Family History:   Problem Relation Age of Onset    Other Mother         multiple myeloma    Stroke Father     Heart Disease Brother         ablation       Social History     Socioeconomic History    Marital status:      Spouse name: Not on file    Number of children: Not on file    Years of education: Not on file    Highest education level: Not on file   Occupational History    Not on file   Tobacco Use    Smoking status: Former     Years: 10.00     Types: Cigarettes     Quit date: 1974     Years since quittin.3     Passive exposure: Past    Smokeless tobacco: Never   Vaping Use    Vaping Use: Never used   Substance and Sexual Activity    Alcohol use:  Yes     Alcohol/week: 0.0 standard drinks     Types: 3 - 4 Glasses of wine per week    Drug use: No    Sexual activity: Not on file   Other Topics Concern    Not on file   Social History Narrative    Not on file     Social Determinants of Health     Financial Resource Strain: Not on file   Food Insecurity: Not on file   Transportation Needs: Not on file   Physical Activity: Not on file   Stress: Not on file   Social Connections: Not on file   Intimate Partner Violence: Not on file   Housing Stability: Not on file         ALLERGIES: Patient has no known allergies. Review of Systems   Constitutional:  Negative for chills, diaphoresis and fever. HENT:  Negative for facial swelling, mouth sores, nosebleeds, trouble swallowing and voice change. Eyes:  Negative for pain and visual disturbance. Respiratory:  Negative for apnea, cough, choking, shortness of breath, wheezing and stridor. Cardiovascular:  Positive for chest pain. Negative for palpitations and leg swelling. Gastrointestinal:  Negative for abdominal distention, abdominal pain, blood in stool, diarrhea, nausea and vomiting. Genitourinary:  Negative for difficulty urinating, dysuria, flank pain, hematuria and pelvic pain. Musculoskeletal:  Negative for joint swelling. Skin:  Negative for color change and rash. Allergic/Immunologic: Negative for immunocompromised state. Neurological:  Positive for light-headedness. Negative for dizziness, seizures, syncope and speech difficulty. Hematological:  Does not bruise/bleed easily. Psychiatric/Behavioral:  Negative for agitation and behavioral problems. Vitals:    09/03/22 0330 09/03/22 0400 09/03/22 0600 09/03/22 0818   BP: (!) 197/95   (!) 188/77   Pulse: 70 66 79 70   Resp: 16   16   Temp: 98.8 °F (37.1 °C)   98.2 °F (36.8 °C)   SpO2: 98%   98%   Weight:       Height:                Physical Exam  Vitals and nursing note reviewed. Constitutional:       General: She is not in acute distress. Appearance: Normal appearance. She is not ill-appearing or toxic-appearing.    HENT:      Head: Normocephalic and atraumatic. Right Ear: External ear normal.      Left Ear: External ear normal.      Nose: Nose normal.      Mouth/Throat:      Mouth: Mucous membranes are moist.      Pharynx: Oropharynx is clear. No oropharyngeal exudate or posterior oropharyngeal erythema. Eyes:      General: No scleral icterus. Extraocular Movements: Extraocular movements intact. Conjunctiva/sclera: Conjunctivae normal.      Pupils: Pupils are equal, round, and reactive to light. Cardiovascular:      Rate and Rhythm: Normal rate and regular rhythm. Pulses: Normal pulses. Heart sounds: Normal heart sounds. No murmur heard. No friction rub. No gallop. Pulmonary:      Effort: Pulmonary effort is normal. No respiratory distress. Breath sounds: Normal breath sounds. No stridor. No wheezing, rhonchi or rales. Abdominal:      General: There is no distension. Palpations: Abdomen is soft. Tenderness: There is no abdominal tenderness. There is no guarding or rebound. Musculoskeletal:         General: No tenderness or deformity. Normal range of motion. Cervical back: Normal range of motion and neck supple. No rigidity. Right lower leg: No edema. Left lower leg: No edema. Skin:     General: Skin is warm. Capillary Refill: Capillary refill takes less than 2 seconds. Coloration: Skin is not jaundiced. Neurological:      General: No focal deficit present. Mental Status: She is alert. Cranial Nerves: No cranial nerve deficit. Sensory: No sensory deficit. Motor: No weakness. Coordination: Coordination normal.   Psychiatric:         Mood and Affect: Mood normal.         Behavior: Behavior normal.         Thought Content:  Thought content normal.         Judgment: Judgment normal.        EKG Interpretation   SR, narrow QRS, nl intervals, no VISHNU/STD/TWI  (EKG tracing interpreted by ED physician)    LABORATORY TESTS:  Admission on 09/02/2022 Component Date Value Ref Range Status    Ventricular Rate 09/02/2022 66  BPM Preliminary    Atrial Rate 09/02/2022 66  BPM Preliminary    P-R Interval 09/02/2022 146  ms Preliminary    QRS Duration 09/02/2022 98  ms Preliminary    Q-T Interval 09/02/2022 404  ms Preliminary    QTC Calculation (Bezet) 09/02/2022 423  ms Preliminary    Calculated P Axis 09/02/2022 70  degrees Preliminary    Calculated R Axis 09/02/2022 -11  degrees Preliminary    Calculated T Axis 09/02/2022 27  degrees Preliminary    Diagnosis 09/02/2022    Preliminary                    Value:Normal sinus rhythm  Normal ECG  When compared with ECG of 23-DEC-2021 06:25,  Sinus rhythm has replaced Atrial fibrillation  Vent. rate has decreased BY  60 BPM  Left bundle branch block is no longer present      WBC 09/02/2022 10.4  3.6 - 11.0 K/uL Final    RBC 09/02/2022 4.07  3.80 - 5.20 M/uL Final    HGB 09/02/2022 12.7  11.5 - 16.0 g/dL Final    HCT 09/02/2022 39.0  35.0 - 47.0 % Final    MCV 09/02/2022 95.8  80.0 - 99.0 FL Final    MCH 09/02/2022 31.2  26.0 - 34.0 PG Final    MCHC 09/02/2022 32.6  30.0 - 36.5 g/dL Final    RDW 09/02/2022 13.9  11.5 - 14.5 % Final    PLATELET 21/04/7144 480  150 - 400 K/uL Final    MPV 09/02/2022 8.3 (A) 8.9 - 12.9 FL Final    NRBC 09/02/2022 0.0  0  WBC Final    ABSOLUTE NRBC 09/02/2022 0.00  0.00 - 0.01 K/uL Final    NEUTROPHILS 09/02/2022 75  32 - 75 % Final    LYMPHOCYTES 09/02/2022 12  12 - 49 % Final    MONOCYTES 09/02/2022 12  5 - 13 % Final    EOSINOPHILS 09/02/2022 0  0 - 7 % Final    BASOPHILS 09/02/2022 0  0 - 1 % Final    IMMATURE GRANULOCYTES 09/02/2022 1 (A) 0.0 - 0.5 % Final    ABS. NEUTROPHILS 09/02/2022 7.7  1.8 - 8.0 K/UL Final    ABS. LYMPHOCYTES 09/02/2022 1.3  0.8 - 3.5 K/UL Final    ABS. MONOCYTES 09/02/2022 1.3 (A) 0.0 - 1.0 K/UL Final    ABS. EOSINOPHILS 09/02/2022 0.0  0.0 - 0.4 K/UL Final    ABS. BASOPHILS 09/02/2022 0.0  0.0 - 0.1 K/UL Final    ABS. IMM.  GRANS. 09/02/2022 0.1 (A) 0.00 - 0.04 K/UL Final    DF 09/02/2022 AUTOMATED    Final    Sodium 09/02/2022 135 (A) 136 - 145 mmol/L Final    Potassium 09/02/2022 4.6  3.5 - 5.1 mmol/L Final    Chloride 09/02/2022 96 (A) 97 - 108 mmol/L Final    CO2 09/02/2022 29  21 - 32 mmol/L Final    Anion gap 09/02/2022 10  5 - 15 mmol/L Final    Glucose 09/02/2022 99  65 - 100 mg/dL Final    BUN 09/02/2022 28 (A) 6 - 20 MG/DL Final    Creatinine 09/02/2022 1.17 (A) 0.55 - 1.02 MG/DL Final    BUN/Creatinine ratio 09/02/2022 24 (A) 12 - 20   Final    GFR est AA 09/02/2022 54 (A) >60 ml/min/1.73m2 Final    GFR est non-AA 09/02/2022 45 (A) >60 ml/min/1.73m2 Final    Estimated GFR is calculated using the IDMS-traceable Modification of Diet in Renal Disease (MDRD) Study equation, reported for both  Americans (GFRAA) and non- Americans (GFRNA), and normalized to 1.73m2 body surface area. The physician must decide which value applies to the patient. Calcium 09/02/2022 8.8  8.5 - 10.1 MG/DL Final    Troponin-High Sensitivity 09/02/2022 17  0 - 51 ng/L Final    Comment: A HS troponin value change of (+ or -) 50% or more below the 99th percentile, in a 1/2/3 hr interval represents a significant change. Clinical correcation is recommended. A HS troponin value change of (+ or -) 20% or above the 99th percentile, in a 1/2/3 hr interval represents a significant change. Clinical correlation is recommended. 99th Percentile:   Women: 0-51 ng/L                                                                Men:   0-76 ng/L      Troponin-High Sensitivity 09/02/2022 27  0 - 51 ng/L Final    Comment: A HS troponin value change of (+ or -) 50% or more below the 99th percentile, in a 1/2/3 hr interval represents a significant change. Clinical correcation is recommended. A HS troponin value change of (+ or -) 20% or above the 99th percentile, in a 1/2/3 hr interval represents a significant change. Clinical correlation is recommended.   99th Percentile:   Women: 0-51 ng/L                                                                Men:   0-76 ng/L      Sodium 09/03/2022 134 (A) 136 - 145 mmol/L Final    Potassium 09/03/2022 4.1  3.5 - 5.1 mmol/L Final    Chloride 09/03/2022 101  97 - 108 mmol/L Final    CO2 09/03/2022 29  21 - 32 mmol/L Final    Anion gap 09/03/2022 4 (A) 5 - 15 mmol/L Final    Glucose 09/03/2022 115 (A) 65 - 100 mg/dL Final    BUN 09/03/2022 22 (A) 6 - 20 MG/DL Final    Creatinine 09/03/2022 1.00  0.55 - 1.02 MG/DL Final    BUN/Creatinine ratio 09/03/2022 22 (A) 12 - 20   Final    GFR est AA 09/03/2022 >60  >60 ml/min/1.73m2 Final    GFR est non-AA 09/03/2022 53 (A) >60 ml/min/1.73m2 Final    Calcium 09/03/2022 8.8  8.5 - 10.1 MG/DL Final    Bilirubin, total 09/03/2022 0.7  0.2 - 1.0 MG/DL Final    ALT (SGPT) 09/03/2022 24  12 - 78 U/L Final    AST (SGOT) 09/03/2022 14 (A) 15 - 37 U/L Final    Alk.  phosphatase 09/03/2022 82  45 - 117 U/L Final    Protein, total 09/03/2022 6.6  6.4 - 8.2 g/dL Final    Albumin 09/03/2022 3.2 (A) 3.5 - 5.0 g/dL Final    Globulin 09/03/2022 3.4  2.0 - 4.0 g/dL Final    A-G Ratio 09/03/2022 0.9 (A) 1.1 - 2.2   Final    WBC 09/03/2022 8.3  3.6 - 11.0 K/uL Final    RBC 09/03/2022 3.88  3.80 - 5.20 M/uL Final    HGB 09/03/2022 12.4  11.5 - 16.0 g/dL Final    HCT 09/03/2022 37.5  35.0 - 47.0 % Final    MCV 09/03/2022 96.6  80.0 - 99.0 FL Final    MCH 09/03/2022 32.0  26.0 - 34.0 PG Final    MCHC 09/03/2022 33.1  30.0 - 36.5 g/dL Final    RDW 09/03/2022 13.7  11.5 - 14.5 % Final    PLATELET 20/17/5350 613  150 - 400 K/uL Final    MPV 09/03/2022 8.3 (A) 8.9 - 12.9 FL Final    NRBC 09/03/2022 0.0  0  WBC Final    ABSOLUTE NRBC 09/03/2022 0.00  0.00 - 0.01 K/uL Final    NEUTROPHILS 09/03/2022 73  32 - 75 % Final    LYMPHOCYTES 09/03/2022 16  12 - 49 % Final    MONOCYTES 09/03/2022 9  5 - 13 % Final    EOSINOPHILS 09/03/2022 0  0 - 7 % Final    BASOPHILS 09/03/2022 1  0 - 1 % Final    IMMATURE GRANULOCYTES 09/03/2022 1 (A) 0.0 - 0.5 % Final    ABS. NEUTROPHILS 09/03/2022 6.2  1.8 - 8.0 K/UL Final    ABS. LYMPHOCYTES 09/03/2022 1.3  0.8 - 3.5 K/UL Final    ABS. MONOCYTES 09/03/2022 0.7  0.0 - 1.0 K/UL Final    ABS. EOSINOPHILS 09/03/2022 0.0  0.0 - 0.4 K/UL Final    ABS. BASOPHILS 09/03/2022 0.0  0.0 - 0.1 K/UL Final    ABS. IMM. GRANS. 09/03/2022 0.0  0.00 - 0.04 K/UL Final    DF 09/03/2022 AUTOMATED    Final    Magnesium 09/03/2022 2.1  1.6 - 2.4 mg/dL Final    INR 09/03/2022 1.0  0.9 - 1.1   Final    A single therapeutic range for Vit K antagonists may not be optimal for all indications - see June, 2008 issue of Chest, American College of Chest Physicians Evidence-Based Clinical Practice Guidelines, 8th Edition. Prothrombin time 09/03/2022 10.7  9.0 - 11.1 sec Final    aPTT 09/03/2022 27.8  22.1 - 31.0 sec Final    In addition to factor deficiency, monitoring heparin therapy, etc., evaluation of a prolonged aPTT result should include consideration of preanalytic variables such as heparin flush contamination, specimen integrity issues, etc.    aPTT, therapeutic range 09/03/2022      58.0 - 77.0 SECS Final    TSH 09/03/2022 2.73  0.36 - 3.74 uIU/mL Final    Comment:      Due to TSH heterogeneity, both structurally and degree of glycosylation, monoclonal antibodies used in the TSH assay may not accurately quantitate TSH. Therefore, this result should be correlated with clinical findings as well as with other assessments of thyroid function, e.g., free T4, free T3. Troponin-High Sensitivity 09/03/2022 136 (A) 0 - 51 ng/L Final    Comment: A HS troponin value change of (+ or -) 50% or more below the 99th percentile, in a 1/2/3 hr interval represents a significant change. Clinical correcation is recommended. A HS troponin value change of (+ or -) 20% or above the 99th percentile, in a 1/2/3 hr interval represents a significant change. Clinical correlation is recommended.   99th Percentile:   Women: 0-51 ng/L Men:   0-76 ng/L  CALLED TO AND READ BACK BY  THAI ESPARZA @0836/[facility]         IMAGING RESULTS:  CTA CHEST W OR W WO CONT   Final Result   Addendum (preliminary) 1 of 1   Addendum: Addendum: Right thyroid nodule. Ultrasound would better evaluate   please correlate with patient's age and functional status for appropriate   indication      Final      1. No evidence of aortic dissection   2. No acute pulmonary embolus   3. Gallstones. No evidence of acute cholecystitis      XR CHEST PORT   Final Result      No acute cardiopulmonary findings.            MEDICATIONS GIVEN:  Medications   nitroglycerin (NITROSTAT) tablet 0.4 mg ( SubLINGual Canceled Entry 9/2/22 1600)   apixaban (ELIQUIS) tablet 5 mg (5 mg Oral Given 9/3/22 0953)   atorvastatin (LIPITOR) tablet 40 mg (40 mg Oral Given 9/3/22 0953)   docusate sodium (COLACE) capsule 100 mg (0 mg Oral Held 9/3/22 0954)   levothyroxine (SYNTHROID) tablet 75 mcg (75 mcg Oral Given 9/3/22 0812)   sodium chloride (NS) flush 5-40 mL (10 mL IntraVENous Given 9/3/22 0812)   sodium chloride (NS) flush 5-40 mL (has no administration in time range)   acetaminophen (TYLENOL) tablet 650 mg (650 mg Oral Given 9/3/22 0953)     Or   acetaminophen (TYLENOL) suppository 650 mg ( Rectal See Alternative 9/3/22 0953)   polyethylene glycol (MIRALAX) packet 17 g (has no administration in time range)   ondansetron (ZOFRAN ODT) tablet 4 mg (has no administration in time range)     Or   ondansetron (ZOFRAN) injection 4 mg (has no administration in time range)   verapamiL (CALAN) tablet 40 mg (40 mg Oral Given 9/3/22 0812)   tiZANidine (ZANAFLEX) tablet 4 mg (4 mg Oral Given 9/3/22 0953)   carvediloL (COREG) tablet 25 mg (0 mg Oral Held 9/3/22 0953)   labetaloL (NORMODYNE;TRANDATE) injection 20 mg (20 mg IntraVENous Given 9/2/22 1636)   iopamidoL (ISOVUE-370) 76 % injection 100 mL (100 mL IntraVENous Given 9/2/22 1707)   aspirin chewable tablet 162 mg (162 mg Oral Given 9/2/22 2203)   acetaminophen (TYLENOL) tablet 1,000 mg (1,000 mg Oral Given 9/2/22 2203)   labetaloL (NORMODYNE;TRANDATE) injection 20 mg (20 mg IntraVENous Given 9/2/22 1933)       PROGRESS NOTE:   The patient's ED course has been uncomplicated    CONSULTS:  none    IMPRESSION:  1. Chest pain, unspecified type    2. Uncontrolled hypertension        PLAN:  - Discharge    MD SONIA Clark  Number of Diagnoses or Management Options  Chest pain, unspecified type  Uncontrolled hypertension  Diagnosis management comments: 81F w/ hx AF on eliquis, HTN, HLD p/w chest pain x1d. Pt nontoxic appearing, /80 w/o resp distress or hypoxia. Ddx includes ACS vs cardiac dysrythmia vs pericarditis vs PNX vs PNA vs bronchitis/COPD/asthma vs CHF vs severe anemia vs HTN emergency/urgency vs gastritis/PUD/GERd vs pleurisy vs costochondritis, less likely PE based on Wells/geneva score and no tachycardia/hypoxia, much less likely esophageal rupture or AD/TAA based on presentation. Ordered CXR, CTA chest, EKG, labs. BP control w/ labetalol. NTG SL for pain. Monitor and reassess. 1640 Pt remains hemodynamically stable. BP improved 190/80. EKG SR w/o ischemic changes. Trop neg x2. CTA chest neg for acute findings including AD/TAA. HEART score=5. Give full dose asa. Admit to tele for uncontrolled HTN and chest pain w/u.        Amount and/or Complexity of Data Reviewed  Clinical lab tests: ordered and reviewed  Tests in the radiology section of CPT®: ordered and reviewed  Tests in the medicine section of CPT®: ordered and reviewed  Discussion of test results with the performing providers: yes  Discuss the patient with other providers: yes  Independent visualization of images, tracings, or specimens: yes    Risk of Complications, Morbidity, and/or Mortality  Presenting problems: moderate  Diagnostic procedures: moderate  Management options: moderate           Procedures        Perfect Serve Consult for Admission  5:46 PM    ED Room Number: 244/93  Patient Name and age:  Emelyn Espinal 80 y.o.  female  Working Diagnosis:   1. Chest pain, unspecified type    2.  Uncontrolled hypertension        COVID-19 Suspicion:  no  Sepsis present:  no  Reassessment needed: no  Code Status:  Full Code  Readmission: no  Isolation Requirements:  no  Recommended Level of Care:  telemetry  Department:[place] [facility] - [phone]

## 2022-09-02 NOTE — ED TRIAGE NOTES
Patient arrives with c/o chest pain, head feeling funny\" and HTN in the 158'D systolic. Patient called PCP and was told to take 40mg lisinopril this morning.

## 2022-09-03 LAB
ALBUMIN SERPL-MCNC: 3.2 G/DL (ref 3.5–5)
ALBUMIN/GLOB SERPL: 0.9 {RATIO} (ref 1.1–2.2)
ALP SERPL-CCNC: 82 U/L (ref 45–117)
ALT SERPL-CCNC: 24 U/L (ref 12–78)
ANION GAP SERPL CALC-SCNC: 4 MMOL/L (ref 5–15)
ANION GAP SERPL CALC-SCNC: 6 MMOL/L (ref 5–15)
APTT PPP: 27.8 SEC (ref 22.1–31)
AST SERPL-CCNC: 14 U/L (ref 15–37)
ATRIAL RATE: 66 BPM
BASOPHILS # BLD: 0 K/UL (ref 0–0.1)
BASOPHILS NFR BLD: 1 % (ref 0–1)
BILIRUB SERPL-MCNC: 0.7 MG/DL (ref 0.2–1)
BUN SERPL-MCNC: 17 MG/DL (ref 6–20)
BUN SERPL-MCNC: 22 MG/DL (ref 6–20)
BUN/CREAT SERPL: 21 (ref 12–20)
BUN/CREAT SERPL: 22 (ref 12–20)
CALCIUM SERPL-MCNC: 8.7 MG/DL (ref 8.5–10.1)
CALCIUM SERPL-MCNC: 8.8 MG/DL (ref 8.5–10.1)
CALCULATED P AXIS, ECG09: 70 DEGREES
CALCULATED R AXIS, ECG10: -11 DEGREES
CALCULATED T AXIS, ECG11: 27 DEGREES
CHLORIDE SERPL-SCNC: 100 MMOL/L (ref 97–108)
CHLORIDE SERPL-SCNC: 101 MMOL/L (ref 97–108)
CO2 SERPL-SCNC: 28 MMOL/L (ref 21–32)
CO2 SERPL-SCNC: 29 MMOL/L (ref 21–32)
COMMENT, HOLDF: NORMAL
CREAT SERPL-MCNC: 0.81 MG/DL (ref 0.55–1.02)
CREAT SERPL-MCNC: 1 MG/DL (ref 0.55–1.02)
DIAGNOSIS, 93000: NORMAL
DIFFERENTIAL METHOD BLD: ABNORMAL
EOSINOPHIL # BLD: 0 K/UL (ref 0–0.4)
EOSINOPHIL NFR BLD: 0 % (ref 0–7)
ERYTHROCYTE [DISTWIDTH] IN BLOOD BY AUTOMATED COUNT: 13.7 % (ref 11.5–14.5)
GLOBULIN SER CALC-MCNC: 3.4 G/DL (ref 2–4)
GLUCOSE SERPL-MCNC: 115 MG/DL (ref 65–100)
GLUCOSE SERPL-MCNC: 135 MG/DL (ref 65–100)
HCT VFR BLD AUTO: 37.5 % (ref 35–47)
HGB BLD-MCNC: 12.4 G/DL (ref 11.5–16)
IMM GRANULOCYTES # BLD AUTO: 0 K/UL (ref 0–0.04)
IMM GRANULOCYTES NFR BLD AUTO: 1 % (ref 0–0.5)
INR PPP: 1 (ref 0.9–1.1)
LYMPHOCYTES # BLD: 1.3 K/UL (ref 0.8–3.5)
LYMPHOCYTES NFR BLD: 16 % (ref 12–49)
MAGNESIUM SERPL-MCNC: 2 MG/DL (ref 1.6–2.4)
MAGNESIUM SERPL-MCNC: 2.1 MG/DL (ref 1.6–2.4)
MCH RBC QN AUTO: 32 PG (ref 26–34)
MCHC RBC AUTO-ENTMCNC: 33.1 G/DL (ref 30–36.5)
MCV RBC AUTO: 96.6 FL (ref 80–99)
MONOCYTES # BLD: 0.7 K/UL (ref 0–1)
MONOCYTES NFR BLD: 9 % (ref 5–13)
NEUTS SEG # BLD: 6.2 K/UL (ref 1.8–8)
NEUTS SEG NFR BLD: 73 % (ref 32–75)
NRBC # BLD: 0 K/UL (ref 0–0.01)
NRBC BLD-RTO: 0 PER 100 WBC
P-R INTERVAL, ECG05: 146 MS
PHOSPHATE SERPL-MCNC: 3.3 MG/DL (ref 2.6–4.7)
PLATELET # BLD AUTO: 261 K/UL (ref 150–400)
PMV BLD AUTO: 8.3 FL (ref 8.9–12.9)
POTASSIUM SERPL-SCNC: 4.1 MMOL/L (ref 3.5–5.1)
POTASSIUM SERPL-SCNC: 4.1 MMOL/L (ref 3.5–5.1)
PROT SERPL-MCNC: 6.6 G/DL (ref 6.4–8.2)
PROTHROMBIN TIME: 10.7 SEC (ref 9–11.1)
Q-T INTERVAL, ECG07: 404 MS
QRS DURATION, ECG06: 98 MS
QTC CALCULATION (BEZET), ECG08: 423 MS
RBC # BLD AUTO: 3.88 M/UL (ref 3.8–5.2)
SAMPLES BEING HELD,HOLD: NORMAL
SODIUM SERPL-SCNC: 134 MMOL/L (ref 136–145)
SODIUM SERPL-SCNC: 134 MMOL/L (ref 136–145)
THERAPEUTIC RANGE,PTTT: NORMAL SECS (ref 58–77)
TROPONIN-HIGH SENSITIVITY: 130 NG/L (ref 0–51)
TROPONIN-HIGH SENSITIVITY: 136 NG/L (ref 0–51)
TSH SERPL DL<=0.05 MIU/L-ACNC: 2.73 UIU/ML (ref 0.36–3.74)
VENTRICULAR RATE, ECG03: 66 BPM
WBC # BLD AUTO: 8.3 K/UL (ref 3.6–11)

## 2022-09-03 PROCEDURE — G0378 HOSPITAL OBSERVATION PER HR: HCPCS

## 2022-09-03 PROCEDURE — 96375 TX/PRO/DX INJ NEW DRUG ADDON: CPT

## 2022-09-03 PROCEDURE — 85730 THROMBOPLASTIN TIME PARTIAL: CPT

## 2022-09-03 PROCEDURE — 85610 PROTHROMBIN TIME: CPT

## 2022-09-03 PROCEDURE — 93005 ELECTROCARDIOGRAM TRACING: CPT

## 2022-09-03 PROCEDURE — 74011000250 HC RX REV CODE- 250: Performed by: FAMILY MEDICINE

## 2022-09-03 PROCEDURE — 84100 ASSAY OF PHOSPHORUS: CPT

## 2022-09-03 PROCEDURE — 85025 COMPLETE CBC W/AUTO DIFF WBC: CPT

## 2022-09-03 PROCEDURE — 74011250637 HC RX REV CODE- 250/637: Performed by: STUDENT IN AN ORGANIZED HEALTH CARE EDUCATION/TRAINING PROGRAM

## 2022-09-03 PROCEDURE — 74011250637 HC RX REV CODE- 250/637: Performed by: FAMILY MEDICINE

## 2022-09-03 PROCEDURE — 84484 ASSAY OF TROPONIN QUANT: CPT

## 2022-09-03 PROCEDURE — 80053 COMPREHEN METABOLIC PANEL: CPT

## 2022-09-03 PROCEDURE — 83735 ASSAY OF MAGNESIUM: CPT

## 2022-09-03 PROCEDURE — 84443 ASSAY THYROID STIM HORMONE: CPT

## 2022-09-03 PROCEDURE — 36415 COLL VENOUS BLD VENIPUNCTURE: CPT

## 2022-09-03 PROCEDURE — 74011250636 HC RX REV CODE- 250/636: Performed by: STUDENT IN AN ORGANIZED HEALTH CARE EDUCATION/TRAINING PROGRAM

## 2022-09-03 RX ORDER — CARVEDILOL 12.5 MG/1
12.5 TABLET ORAL 2 TIMES DAILY WITH MEALS
Status: DISCONTINUED | OUTPATIENT
Start: 2022-09-03 | End: 2022-09-03

## 2022-09-03 RX ORDER — VERAPAMIL HYDROCHLORIDE 80 MG/1
40 TABLET ORAL 4 TIMES DAILY
Status: DISCONTINUED | OUTPATIENT
Start: 2022-09-03 | End: 2022-09-04

## 2022-09-03 RX ORDER — SODIUM CHLORIDE 0.9 % (FLUSH) 0.9 %
5-40 SYRINGE (ML) INJECTION AS NEEDED
Status: DISCONTINUED | OUTPATIENT
Start: 2022-09-03 | End: 2022-09-04 | Stop reason: HOSPADM

## 2022-09-03 RX ORDER — ATORVASTATIN CALCIUM 40 MG/1
40 TABLET, FILM COATED ORAL DAILY
Status: DISCONTINUED | OUTPATIENT
Start: 2022-09-03 | End: 2022-09-04 | Stop reason: HOSPADM

## 2022-09-03 RX ORDER — SODIUM CHLORIDE 0.9 % (FLUSH) 0.9 %
5-40 SYRINGE (ML) INJECTION EVERY 8 HOURS
Status: DISCONTINUED | OUTPATIENT
Start: 2022-09-03 | End: 2022-09-04 | Stop reason: HOSPADM

## 2022-09-03 RX ORDER — CARVEDILOL 12.5 MG/1
25 TABLET ORAL 2 TIMES DAILY
Status: DISCONTINUED | OUTPATIENT
Start: 2022-09-03 | End: 2022-09-04 | Stop reason: HOSPADM

## 2022-09-03 RX ORDER — TIZANIDINE 4 MG/1
4 TABLET ORAL
Status: DISCONTINUED | OUTPATIENT
Start: 2022-09-03 | End: 2022-09-04 | Stop reason: HOSPADM

## 2022-09-03 RX ORDER — ACETAMINOPHEN 650 MG/1
650 SUPPOSITORY RECTAL
Status: DISCONTINUED | OUTPATIENT
Start: 2022-09-03 | End: 2022-09-04 | Stop reason: HOSPADM

## 2022-09-03 RX ORDER — POLYETHYLENE GLYCOL 3350 17 G/17G
17 POWDER, FOR SOLUTION ORAL DAILY PRN
Status: DISCONTINUED | OUTPATIENT
Start: 2022-09-03 | End: 2022-09-04 | Stop reason: HOSPADM

## 2022-09-03 RX ORDER — ONDANSETRON 4 MG/1
4 TABLET, ORALLY DISINTEGRATING ORAL
Status: DISCONTINUED | OUTPATIENT
Start: 2022-09-03 | End: 2022-09-04 | Stop reason: HOSPADM

## 2022-09-03 RX ORDER — CARVEDILOL 12.5 MG/1
12.5 TABLET ORAL 2 TIMES DAILY
Status: DISCONTINUED | OUTPATIENT
Start: 2022-09-03 | End: 2022-09-03

## 2022-09-03 RX ORDER — DOCUSATE SODIUM 100 MG/1
100 CAPSULE, LIQUID FILLED ORAL 2 TIMES DAILY
Status: DISCONTINUED | OUTPATIENT
Start: 2022-09-03 | End: 2022-09-04 | Stop reason: HOSPADM

## 2022-09-03 RX ORDER — LEVOTHYROXINE SODIUM 75 UG/1
75 TABLET ORAL
Status: DISCONTINUED | OUTPATIENT
Start: 2022-09-03 | End: 2022-09-04 | Stop reason: HOSPADM

## 2022-09-03 RX ORDER — ACETAMINOPHEN 325 MG/1
650 TABLET ORAL
Status: DISCONTINUED | OUTPATIENT
Start: 2022-09-03 | End: 2022-09-04 | Stop reason: HOSPADM

## 2022-09-03 RX ORDER — HYDRALAZINE HYDROCHLORIDE 20 MG/ML
20 INJECTION INTRAMUSCULAR; INTRAVENOUS
Status: DISCONTINUED | OUTPATIENT
Start: 2022-09-03 | End: 2022-09-04 | Stop reason: HOSPADM

## 2022-09-03 RX ORDER — ONDANSETRON 2 MG/ML
4 INJECTION INTRAMUSCULAR; INTRAVENOUS
Status: DISCONTINUED | OUTPATIENT
Start: 2022-09-03 | End: 2022-09-04 | Stop reason: HOSPADM

## 2022-09-03 RX ADMIN — HYDRALAZINE HYDROCHLORIDE 20 MG: 20 INJECTION, SOLUTION INTRAMUSCULAR; INTRAVENOUS at 13:05

## 2022-09-03 RX ADMIN — ONDANSETRON 4 MG: 4 TABLET, ORALLY DISINTEGRATING ORAL at 19:12

## 2022-09-03 RX ADMIN — SODIUM CHLORIDE, PRESERVATIVE FREE 10 ML: 5 INJECTION INTRAVENOUS at 08:12

## 2022-09-03 RX ADMIN — APIXABAN 5 MG: 5 TABLET, FILM COATED ORAL at 09:53

## 2022-09-03 RX ADMIN — ACETAMINOPHEN 650 MG: 325 TABLET, FILM COATED ORAL at 22:21

## 2022-09-03 RX ADMIN — VERAPAMIL HYDROCHLORIDE 40 MG: 80 TABLET ORAL at 22:21

## 2022-09-03 RX ADMIN — CARVEDILOL 25 MG: 12.5 TABLET, FILM COATED ORAL at 17:44

## 2022-09-03 RX ADMIN — ACETAMINOPHEN 650 MG: 325 TABLET, FILM COATED ORAL at 09:53

## 2022-09-03 RX ADMIN — SODIUM CHLORIDE, PRESERVATIVE FREE 10 ML: 5 INJECTION INTRAVENOUS at 22:22

## 2022-09-03 RX ADMIN — LEVOTHYROXINE SODIUM 75 MCG: 0.07 TABLET ORAL at 08:12

## 2022-09-03 RX ADMIN — VERAPAMIL HYDROCHLORIDE 40 MG: 80 TABLET ORAL at 13:05

## 2022-09-03 RX ADMIN — APIXABAN 5 MG: 5 TABLET, FILM COATED ORAL at 17:44

## 2022-09-03 RX ADMIN — DOCUSATE SODIUM 100 MG: 100 CAPSULE, LIQUID FILLED ORAL at 17:44

## 2022-09-03 RX ADMIN — TIZANIDINE 4 MG: 4 TABLET ORAL at 09:53

## 2022-09-03 RX ADMIN — ATORVASTATIN CALCIUM 40 MG: 40 TABLET, FILM COATED ORAL at 09:53

## 2022-09-03 RX ADMIN — VERAPAMIL HYDROCHLORIDE 40 MG: 80 TABLET ORAL at 08:12

## 2022-09-03 RX ADMIN — VERAPAMIL HYDROCHLORIDE 40 MG: 80 TABLET ORAL at 17:44

## 2022-09-03 NOTE — PROGRESS NOTES
Transition of Care Plan  RUR- Low  /  Medium / High Risks   DISPOSITION: TBD/subject to change pending review and recommendations; pending medical progression  Anticipate home with family assistance once medically stable. Cardiology Consulted and Following. F/U with PCP/Specialist    Will need follow-up appointment with PCP prior to discharge. Transport: Family    CM will continue to follow and assist with NILA needs as they arise. Reason for Admission:  Chest pain                     RUR Score:   N/A                  Plan for utilizing home health:  TBD/subject to change pending review and recommendations. No previous history of HH/SNF. PCP: YES First and Last name:  Savanna Rajput MD     Name of Practice: [facility]   Are you a current patient: Yes/No: YES   Approximate date of last visit: Patient reports 2 weeks ago. Can you participate in a virtual visit with your PCP: YES                    Current Advanced Directive/Advance Care Plan: Full Code/ No ACP on file      Healthcare Decision Maker:   Spouse: Willie Meza [phone]    80year old female, AOx4. PTA independent with ADL's and IADL's. DME utilized: [facility] at [facility]. Resides with spouse in their own home. Supportive and involved family. Receives  as source of income with no significant financial stressors or concerns. Insurance verified: [employer]. [facility] and [address] [place] [address] are utilized for prescriptions. Medicare Outpatient Observation Notice (MOON)/ [place] Outpatient Observation Notice ([facility]) provided to patient/representative with verbal explanation of the notice. Time allotted for questions regarding the notice. Patient /representative provided a completed copy of the MOON/VOON notice. Copy placed on bedside chart. Care Management Interventions  PCP Verified by CM:  Yes  Palliative Care Criteria Met (RRAT>21 & CHF Dx)?: No  Mode of Transport at Discharge: Other (see comment) (Family)  Transition of Care Consult (CM Consult):  (No current CM consult or need at this time)  Discharge Durable Medical Equipment: No  Physical Therapy Consult: No  Occupational Therapy Consult: No  Speech Therapy Consult: No  Support Systems: Spouse/Significant Other, Child(radha), Other Family Member(s)  Confirm Follow Up Transport: Family  Discharge Location  Patient Expects to be Discharged to[de-identified] Home with family assistance (TBD/subject to change pending review and recommendations)    LOUIE Levine/ASHLEY  Care Management  11:06 AM

## 2022-09-03 NOTE — ROUTINE PROCESS
Bedside shift change report given to jessica bañuelos rn (oncoming nurse) by Cong Aguilera (offgoing nurse). Report included the following information SBAR and Kardex.

## 2022-09-03 NOTE — H&P
[facility] H[facility]PITAL[facility]    History & Physical      Date of admission: 9/2/2022    Patient name: Teddy Grove  MRN: [id number]  YOB: 1940  Age: 80 y.o. Primary care provider:  Tomi Preston MD     Source of Information: patient, ED and electronic medical records                              Chief complaint:  \"I just felt funny. \"    History of present illness  Teddy Grove is a 80 y.o. female with past medical history of arthritis, GERD, hypertension, hyperlipidemia, atrial fibrillation on Eliquis, sleep apnea, and thyroid disease presented as a direct admission/ transfer from [facility] ED ([facility]) to [facility]) with chief complaint of \"I just felt funny. \". Symptom onset began yesterday, with vaguely described symptoms of \"not feeling right\". Per ER reports, patient had chest pain,  but patient is reluctant to admit to chest pain. She alludes to have some chest discomfort, of questionable location, severity, character, or specific aggravating/ alleviating factors. She vaguely describes possible lightheadedness. Patient also complained of high systolic blood pressure reading in 200s. She reportedly called her PCP office and was told to take Lisinopril 40 mg po yesterday morning. Patient then went to [facility] where initial recorded vital signs were T 97.9 F, /107, HR 68, RR 16, O2sat 100% on room air. Troponin levels were  17 and 27 respectively. Abnormal labs included BUN 28, creatinine 1.17, GFR 45, Na 135. CTA  chest with IV contrast showed no acute process. ED ordered Labetalol 20 mg IV x 2, Nitroglycerin 0.4 mg sublingual x 1, Aspirin 162 mg po x 1, and Tylenol 1000 mg po x 1. Patient was then transferred to [facility] [place]. She is now seen for admission to the hospitalist service.        Past Medical History:   Diagnosis Date    Arrhythmia     attempted ablation    Arthritis     GERD (gastroesophageal reflux disease)     High cholesterol     History of seasonal allergies     Hypertension     Sleep apnea     mild-does not use CPAP    Thyroid disease       Past Surgical History:   Procedure Laterality Date    COLONOSCOPY N/A 10/14/2020    COLONOSCOPY    :- performed by Elina Melchor MD at [facility] ENDOSCOPY    HX BREAST BIOPSY Right Years ago    Negative    HX CATARACT REMOVAL      bilateral    HX OTHER SURGICAL      cyst removed from left cheek    TN BREAST SURGERY PROCEDURE UNLISTED      breast biopsy-local - benign    TN CARDIAC SURG PROCEDURE UNLIST      attempted ablation     MEDICATIONS:  Prior to Admission medications    Medication Sig Start Date End Date Taking? Authorizing Provider   tiZANidine (ZANAFLEX) 4 mg capsule Take 4 mg by mouth DIALYSIS PRN. Yes Other, MD Rosa   verapamiL (CALAN) 40 mg tablet Take 40 mg by mouth. Yes Other, MD Rosa   Lactobac no.41/Bifidobact no.7 (PROBIOTIC-10 PO) Take  by mouth. Yes Other, MD Rosa   docusate sodium (Stool Softener) 100 mg capsule Take 100 mg by mouth two (2) times a day. Yes Other, MD Rosa   Comp. Stocking,Thigh,Long,Large misc Dx:orthoststic hypotension. 12/29/21  Yes Julio Roland MD   apixaban (ELIQUIS) 5 mg tablet Take 5 mg by mouth two (2) times a day. Yes Other, MD Rosa   atorvastatin (LIPITOR) 40 mg tablet Take 40 mg by mouth daily. Yes Mae, MD Rosa   levothyroxine (SYNTHROID) 75 mcg tablet Take 75 mcg by mouth Daily (before breakfast). Yes Provider, Historical   co-enzyme Q-10 (CO Q-10) 100 mg capsule Take 100 mg by mouth daily. Yes Provider, Historical   traMADoL (ULTRAM) 50 mg tablet Take 50 mg by mouth every six (6) hours as needed for Pain.     Other, MD Rosa     ALLERGIES:  No Known Allergies      Social history  Patient resides    Independently      With family care      Assisted living      SNF    Ambulates    Independently      With cane       Assisted walker         Alcohol history     None     Social     Chronic   Smoking history    None     Former smoker     Current smoker     Family History   Problem Relation Age of Onset    Other Mother         multiple myeloma    Stroke Father     Heart Disease Brother         ablation          Review of systems  Pertinent positives as noted in HPI. All other systems were reviewed and were negative     Physical Examination   Visit Vitals  BP (!) 184/80   Pulse 72   Temp 97.9 °F (36.6 °C)   Resp 16   Ht 5' 8\" (1.727 m)   Wt 93.9 kg (207 lb 0.2 oz)   SpO2 100%   BMI 31.48 kg/m²               General:  Obese female in no acute respiratory distress. Head:  Normocephalic, without obvious abnormality, atraumatic   Eyes:  Conjunctivae/corneas clear. Pupils 2 mm reactive bilateral.   E/N/M/T: Nares normal. Septum midline. No nasal drainage or sinus tenderness  Tongue midline/ non-edematous  Clear oropharynx   Neck: Normal appearance and movements, symmetrical, trachea midline  No palpable adenopathy  No thyroid enlargement, tenderness or nodules  No carotid bruit   No JVD  Trachea midline   Lungs:   Symmetrical chest expansion and respiratory effort  Clear to auscultation bilaterally   Chest wall:  No tenderness or deformity   Heart:  Regular rate and rhythm   Normal S1 and S2; no murmur, click, rub or gallop   Abdomen:   Soft, no tenderness  No rebound, guarding, or rigidity  Non-distended   Bowel sounds normal  No masses or hepatosplenomegaly  No hernias present   Back: No costovertebral angle tenderness  No step-off deformity   Extremities: Extremities normal, atraumatic  No cyanosis or edema     Vascular/  Pulses: 2+ radial/ 1+ DP bilateral pulses   Integument/  Skin: No rashes or ulcers  Warm and dry   Musculo-      skeletal: Gait not tested  Normal symmetry, ROM, strength and tone  No calf tenderness   Neuro: GCS 15. Moves all extremities x 4. No slurred speech. No facial droop. Sensation grossly intact.     Psych: Alert, oriented x 3     Geniturinary:      I reviewed the following data:      24 Hour Results:  Recent Results (from the past 24 hour(s))   CBC WITH AUTOMATED DIFF    Collection Time: 09/02/22  3:25 PM   Result Value Ref Range    WBC 10.4 3.6 - 11.0 K/uL    RBC 4.07 3.80 - 5.20 M/uL    HGB 12.7 11.5 - 16.0 g/dL    HCT 39.0 35.0 - 47.0 %    MCV 95.8 80.0 - 99.0 FL    MCH 31.2 26.0 - 34.0 PG    MCHC 32.6 30.0 - 36.5 g/dL    RDW 13.9 11.5 - 14.5 %    PLATELET 025 140 - 038 K/uL    MPV 8.3 (L) 8.9 - 12.9 FL    NRBC 0.0 0  WBC    ABSOLUTE NRBC 0.00 0.00 - 0.01 K/uL    NEUTROPHILS 75 32 - 75 %    LYMPHOCYTES 12 12 - 49 %    MONOCYTES 12 5 - 13 %    EOSINOPHILS 0 0 - 7 %    BASOPHILS 0 0 - 1 %    IMMATURE GRANULOCYTES 1 (H) 0.0 - 0.5 %    ABS. NEUTROPHILS 7.7 1.8 - 8.0 K/UL    ABS. LYMPHOCYTES 1.3 0.8 - 3.5 K/UL    ABS. MONOCYTES 1.3 (H) 0.0 - 1.0 K/UL    ABS. EOSINOPHILS 0.0 0.0 - 0.4 K/UL    ABS. BASOPHILS 0.0 0.0 - 0.1 K/UL    ABS. IMM.  GRANS. 0.1 (H) 0.00 - 0.04 K/UL    DF AUTOMATED     METABOLIC PANEL, BASIC    Collection Time: 09/02/22  3:25 PM   Result Value Ref Range    Sodium 135 (L) 136 - 145 mmol/L    Potassium 4.6 3.5 - 5.1 mmol/L    Chloride 96 (L) 97 - 108 mmol/L    CO2 29 21 - 32 mmol/L    Anion gap 10 5 - 15 mmol/L    Glucose 99 65 - 100 mg/dL    BUN 28 (H) 6 - 20 MG/DL    Creatinine 1.17 (H) 0.55 - 1.02 MG/DL    BUN/Creatinine ratio 24 (H) 12 - 20      GFR est AA 54 (L) >60 ml/min/1.73m2    GFR est non-AA 45 (L) >60 ml/min/1.73m2    Calcium 8.8 8.5 - 10.1 MG/DL   TROPONIN-HIGH SENSITIVITY    Collection Time: 09/02/22  3:25 PM   Result Value Ref Range    Troponin-High Sensitivity 17 0 - 51 ng/L   TROPONIN-HIGH SENSITIVITY    Collection Time: 09/02/22  5:30 PM   Result Value Ref Range    Troponin-High Sensitivity 27 0 - 51 ng/L     Recent Labs     09/02/22  1525   WBC 10.4   HGB 12.7   HCT 39.0        Recent Labs     09/02/22  1525   *   K 4.6   CL 96*   CO2 29   GLU 99   BUN 28*   CREA 1.17*   CA 8.8       Imaging    CTA CHEST W OR W WO CONT    INDICATION: Chest pain evaluate for dissection     COMPARISON: Earlier same day     TECHNIQUE:  2.5 mm axial images were obtained from the apices to the lung bases  after the intravenous administration of 100 cc of Isovue-370. Three-dimensional  postprocessing was performed by the technologist with MIP reconstructions. CT  dose reduction was achieved through use of a standardized protocol tailored for  this examination and automatic exposure control for dose modulation. FINDINGS:     THYROID: 15 mm nodule right lobe of the thyroid  MEDIASTINUM: No mass or lymphadenopathy. YANELIS: No mass or lymphadenopathy. THORACIC AORTA: No dissection or aneurysm. Minimal atherosclerotic change  MAIN PULMONARY ARTERY: No acute pulmonary embolus  TRACHEA/BRONCHI: Patent. ESOPHAGUS: No wall thickening or dilatation. HEART: Normal in size. Coronary artery calcifications  PLEURA: No effusion or pneumothorax. LUNGS: No nodule, mass, or airspace disease. Calcified granuloma in the right  lower lobe. No consolidation  INCIDENTALLY IMAGED UPPER ABDOMEN: Small hepatic and renal cyst. Incidental  gallstones. No evidence of acute cholecystitis. Common duct is not dilated. BONES: No destructive bone lesion. IMPRESSION     1. No evidence of aortic dissection  2. No acute pulmonary embolus  3. Gallstones. No evidence of acute cholecystitis    XR CHEST PORT     DATE: 9/2/2022 3:37 PM     INDICATION: chest pain     COMPARISON: Chest December 23, 2021     FINDINGS: AP portable chest radiograph. Lungs are adequately expanded. Heart  size is normal. No focal lung infiltrate is seen. The vascular clarity is  normal. There is no evidence of effusion or pneumothorax. IMPRESSION     No acute cardiopulmonary findings. Assessment and Plan     1. Uncontrolled hypertension  -resume home BP medications  -provide additional ant-hypertensive therapy as needed  -monitor BP closely    2.  Acute chest pain   -although patient does not currently endorse, per initial ED triage and ED MD note, chest pain was a presenting symptom. No chest pain reported at current.  -admit to telemetry with continuous monitoring  -repeat/ trend high sensitivity troponin levels  -consult cardiologist  -order nuclear medicine cardiac stress test  -2d echocardiogram  -may have Morphine, oxygen, Nitroglycerin, Aspirin therapy  -order 12 lead EKG      3. Atrial fibrillation  -on Eliquis; continue the same  -currently rate controlled  -continue telemetry monitoring    4. Hyperlipidemia  -check lipid panel  -continue Atorvastatin    5. CARRILLO (acute kidney injury)  -with BUN 28, creatinine 1.17, GFR 45  -order 0.9% NS IV fluids @ 75 ml/hr; low rate ordered due to elevated BP  -repeat renal panel this a.m.  -order UA    6. Obstructive sleep apnea  -may use CPAP if patient tolerates    7. Hypothyroidism  -continue Levothyroxine home dose    8. Obesity  -BMI  31.48 kg/m2  -would encourage weight loss, dietary and lifestyle modifications      Diet: heart healthy  Activity: ambulate as tolerated  DVT prophylaxis: already on Eliquis  Isolation precautions: none  Consultations: cardiologist    ADVANCED DIRECTIVE/ CODE STATUS:  FULL CODE as per discussion with patient.        Signed by: Marlene Livingston MD    September 3, 2022 at 2:09 AM

## 2022-09-03 NOTE — PROGRESS NOTES
Pt admitted from [place] [facility] via transport to room 203. Pt admitted under the services of the Hospitalist.  Pt assisted  to bed with one person assist.  Pt denies pain and sob. Pt's call light within reach and safety in [facility].

## 2022-09-03 NOTE — ROUTINE PROCESS
Tele monitor reports a 12 beat run of V tach,  stat EKG ordered. Dr. Elizabeth Stapleton notified via perfect serve.

## 2022-09-03 NOTE — PROGRESS NOTES
[facility] [facility]  Hospitalist Group                                                                                          Hospitalist Progress Note  Mariusz Amin MD  Answering service: [phone] from in house phone        Date of Service:  9/3/2022  NAME:  Abeba Urias  :  1940  MRN:  [id number]      Admission Summary:   Chidi Bueno is a 80 y.o. female with past medical history of arthritis, GERD, hypertension, hyperlipidemia, atrial fibrillation on Eliquis, sleep apnea, and thyroid disease presented as a direct admission/ transfer from [facility] ED ([facility]) to [facility]) with chief complaint of \"I just felt funny. \". Symptom onset began yesterday, with vaguely described symptoms of \"not feeling right\". Per ER reports, patient had chest pain,  but patient is reluctant to admit to chest pain. She alludes to have some chest discomfort, of questionable location, severity, character, or specific aggravating/ alleviating factors. She vaguely describes possible lightheadedness. Patient also complained of high systolic blood pressure reading in 200s. She reportedly called her PCP office and was told to take Lisinopril 40 mg po yesterday morning. Patient then went to [facility] where initial recorded vital signs were T 97.9 F, /107, HR 68, RR 16, O2sat 100% on room air. Troponin levels were  17 and 27 respectively. Abnormal labs included BUN 28, creatinine 1.17, GFR 45, Na 135. CTA  chest with IV contrast showed no acute process. ED ordered Labetalol 20 mg IV x 2, Nitroglycerin 0.4 mg sublingual x 1, Aspirin 162 mg po x 1, and Tylenol 1000 mg po x 1. Patient was then transferred to [facility]. She is now seen for admission to the hospitalist service. \"       Interval history / Subjective:   Patient seen examined at bedside earlier.   No active angina or shortness of breath just complaining of hip pain usually takes tizanidine for this     Assessment & Plan:     Troponin elevation  -no active cp currently  -Cardiology consulted awaiting eval  - Nuclear stress test ordered pending  - TTE pending  - Continue aspirin, statin, added Coreg for concurrent blood pressure control  -CTA chest neg for pe    Uncontrolled hypertension  - As discussed above, also on verapamil patient's home med    A. fib  - Continue Eliquis, rate controlled    Hyperlipidemia  - Continue statin    CARRILLO  - Continue IV fluid, improved     Obesity  - Counseled on diet weight loss     Code status: Full  Prophylaxis: Eliquis  Care Plan discussed with: Patient/family, nurse  Anticipated Disposition: 24 to 48 hours pending cardiology eval     Hospital Problems  Date Reviewed: 12/20/2021            Codes Class Noted POA    Chest pain ICD-10-CM: R07.9  ICD-9-CM: 786.50  12/19/2021 Unknown             Review of Systems:   A comprehensive review of systems was negative except for that written in the HPI. Vital Signs:    Last 24hrs VS reviewed since prior progress note. Most recent are:  Visit Vitals  BP (!) 174/77 (BP 1 Location: Left upper arm, BP Patient Position: At rest)   Pulse 65   Temp 98.2 °F (36.8 °C)   Resp 18   Ht 5' 8\" (1.727 m)   Wt 93.9 kg (207 lb 0.2 oz)   SpO2 96%   BMI 31.48 kg/m²         Intake/Output Summary (Last 24 hours) at 9/3/2022 1246  Last data filed at 9/3/2022 0200  Gross per 24 hour   Intake 120 ml   Output --   Net 120 ml        Physical Examination:     I had a face to face encounter with this patient and independently examined them on 9/3/2022 as outlined below:          Constitutional:  No acute distress, cooperative, pleasant    ENT:  Oral mucosa moist, oropharynx benign. Resp:  CTA bilaterally. No wheezing/rhonchi/rales. No accessory muscle use. CV:  Regular rhythm, normal rate, no murmurs, gallops, rubs    GI:  Soft, non distended, non tender.  normoactive bowel sounds, no hepatosplenomegaly     Musculoskeletal:  No edema, warm, 2+ pulses throughout, RLE has some bruising noted around the ankle chronic for patient     Neurologic:  Moves all extremities. AAOx3, CN II-XII reviewed            Data Review:    Review and/or order of clinical lab test  Review and/or order of tests in the radiology section of CPT  Review and/or order of tests in the medicine section of CPT      Labs:     Recent Labs     09/03/22 0357 09/02/22  1525   WBC 8.3 10.4   HGB 12.4 12.7   HCT 37.5 39.0    272     Recent Labs     09/03/22 0357 09/02/22  1525   * 135*   K 4.1 4.6    96*   CO2 29 29   BUN 22* 28*   CREA 1.00 1.17*   * 99   CA 8.8 8.8   MG 2.1  --      Recent Labs     09/03/22 0357   ALT 24   AP 82   TBILI 0.7   TP 6.6   ALB 3.2*   GLOB 3.4     Recent Labs     09/03/22 0357   INR 1.0   PTP 10.7   APTT 27.8      No results for input(s): FE, TIBC, PSAT, FERR in the last 72 hours. No results found for: FOL, RBCF   No results for input(s): PH, PCO2, PO2 in the last 72 hours. No results for input(s): CPK, CKNDX, TROIQ in the last 72 hours.     No lab exists for component: CPKMB  No results found for: CHOL, CHOLX, CHLST, CHOLV, HDL, HDLP, LDL, LDLC, DLDLP, TGLX, TRIGL, TRIGP, CHHD, CHHDX  Lab Results   Component Value Date/Time    Glucose (POC) 104 12/25/2021 12:00 PM     Lab Results   Component Value Date/Time    Color YELLOW/STRAW 12/25/2021 05:04 PM    Appearance CLEAR 12/25/2021 05:04 PM    Specific gravity 1.016 12/25/2021 05:04 PM    pH (UA) 7.0 12/25/2021 05:04 PM    Protein Negative 12/25/2021 05:04 PM    Glucose Negative 12/25/2021 05:04 PM    Ketone Negative 12/25/2021 05:04 PM    Bilirubin Negative 12/25/2021 05:04 PM    Urobilinogen 0.2 12/25/2021 05:04 PM    Nitrites Negative 12/25/2021 05:04 PM    Leukocyte Esterase Negative 12/25/2021 05:04 PM    Epithelial cells FEW 12/25/2021 05:04 PM    Bacteria Negative 12/25/2021 05:04 PM    WBC 0-4 12/25/2021 05:04 PM    RBC 0-5 12/25/2021 05:04 PM         Medications Reviewed:     Current Facility-Administered Medications   Medication Dose Route Frequency    apixaban (ELIQUIS) tablet 5 mg  5 mg Oral BID    atorvastatin (LIPITOR) tablet 40 mg  40 mg Oral DAILY    docusate sodium (COLACE) capsule 100 mg  100 mg Oral BID    levothyroxine (SYNTHROID) tablet 75 mcg  75 mcg Oral ACB    sodium chloride (NS) flush 5-40 mL  5-40 mL IntraVENous Q8H    sodium chloride (NS) flush 5-40 mL  5-40 mL IntraVENous PRN    acetaminophen (TYLENOL) tablet 650 mg  650 mg Oral Q6H PRN    Or    acetaminophen (TYLENOL) suppository 650 mg  650 mg Rectal Q6H PRN    polyethylene glycol (MIRALAX) packet 17 g  17 g Oral DAILY PRN    ondansetron (ZOFRAN ODT) tablet 4 mg  4 mg Oral Q8H PRN    Or    ondansetron (ZOFRAN) injection 4 mg  4 mg IntraVENous Q6H PRN    verapamiL (CALAN) tablet 40 mg  40 mg Oral QID    tiZANidine (ZANAFLEX) tablet 4 mg  4 mg Oral TID PRN    carvediloL (COREG) tablet 25 mg  25 mg Oral BID    hydrALAZINE (APRESOLINE) 20 mg/mL injection 20 mg  20 mg IntraVENous Q6H PRN     ______________________________________________________________________  EXPECTED LENGTH OF STAY: - - -  ACTUAL LENGTH OF STAY:          0                 Chacha Meng MD

## 2022-09-04 VITALS
BODY MASS INDEX: 31.52 KG/M2 | SYSTOLIC BLOOD PRESSURE: 92 MMHG | HEIGHT: 68 IN | RESPIRATION RATE: 18 BRPM | WEIGHT: 208 LBS | DIASTOLIC BLOOD PRESSURE: 55 MMHG | OXYGEN SATURATION: 97 % | HEART RATE: 65 BPM | TEMPERATURE: 98.4 F

## 2022-09-04 LAB
ANION GAP SERPL CALC-SCNC: 4 MMOL/L (ref 5–15)
ATRIAL RATE: 63 BPM
ATRIAL RATE: 66 BPM
BASOPHILS # BLD: 0 K/UL (ref 0–0.1)
BASOPHILS NFR BLD: 0 % (ref 0–1)
BUN SERPL-MCNC: 16 MG/DL (ref 6–20)
BUN/CREAT SERPL: 19 (ref 12–20)
CALCIUM SERPL-MCNC: 8.9 MG/DL (ref 8.5–10.1)
CALCULATED P AXIS, ECG09: 66 DEGREES
CALCULATED P AXIS, ECG09: 70 DEGREES
CALCULATED R AXIS, ECG10: -14 DEGREES
CALCULATED R AXIS, ECG10: -5 DEGREES
CALCULATED T AXIS, ECG11: 36 DEGREES
CALCULATED T AXIS, ECG11: 41 DEGREES
CHLORIDE SERPL-SCNC: 98 MMOL/L (ref 97–108)
CO2 SERPL-SCNC: 29 MMOL/L (ref 21–32)
CREAT SERPL-MCNC: 0.85 MG/DL (ref 0.55–1.02)
DIAGNOSIS, 93000: NORMAL
DIAGNOSIS, 93000: NORMAL
DIFFERENTIAL METHOD BLD: ABNORMAL
EOSINOPHIL # BLD: 0.1 K/UL (ref 0–0.4)
EOSINOPHIL NFR BLD: 1 % (ref 0–7)
ERYTHROCYTE [DISTWIDTH] IN BLOOD BY AUTOMATED COUNT: 13.6 % (ref 11.5–14.5)
GLUCOSE SERPL-MCNC: 100 MG/DL (ref 65–100)
HCT VFR BLD AUTO: 37.1 % (ref 35–47)
HGB BLD-MCNC: 12.2 G/DL (ref 11.5–16)
IMM GRANULOCYTES # BLD AUTO: 0 K/UL (ref 0–0.04)
IMM GRANULOCYTES NFR BLD AUTO: 1 % (ref 0–0.5)
LYMPHOCYTES # BLD: 1.6 K/UL (ref 0.8–3.5)
LYMPHOCYTES NFR BLD: 18 % (ref 12–49)
MCH RBC QN AUTO: 31.6 PG (ref 26–34)
MCHC RBC AUTO-ENTMCNC: 32.9 G/DL (ref 30–36.5)
MCV RBC AUTO: 96.1 FL (ref 80–99)
MONOCYTES # BLD: 1.1 K/UL (ref 0–1)
MONOCYTES NFR BLD: 12 % (ref 5–13)
NEUTS SEG # BLD: 6 K/UL (ref 1.8–8)
NEUTS SEG NFR BLD: 68 % (ref 32–75)
NRBC # BLD: 0 K/UL (ref 0–0.01)
NRBC BLD-RTO: 0 PER 100 WBC
P-R INTERVAL, ECG05: 154 MS
P-R INTERVAL, ECG05: 162 MS
PLATELET # BLD AUTO: 244 K/UL (ref 150–400)
PMV BLD AUTO: 8.1 FL (ref 8.9–12.9)
POTASSIUM SERPL-SCNC: 4.1 MMOL/L (ref 3.5–5.1)
Q-T INTERVAL, ECG07: 412 MS
Q-T INTERVAL, ECG07: 428 MS
QRS DURATION, ECG06: 102 MS
QRS DURATION, ECG06: 98 MS
QTC CALCULATION (BEZET), ECG08: 431 MS
QTC CALCULATION (BEZET), ECG08: 437 MS
RBC # BLD AUTO: 3.86 M/UL (ref 3.8–5.2)
SODIUM SERPL-SCNC: 131 MMOL/L (ref 136–145)
VENTRICULAR RATE, ECG03: 63 BPM
VENTRICULAR RATE, ECG03: 66 BPM
WBC # BLD AUTO: 8.7 K/UL (ref 3.6–11)

## 2022-09-04 PROCEDURE — 85025 COMPLETE CBC W/AUTO DIFF WBC: CPT

## 2022-09-04 PROCEDURE — 74011250637 HC RX REV CODE- 250/637: Performed by: FAMILY MEDICINE

## 2022-09-04 PROCEDURE — 74011250636 HC RX REV CODE- 250/636: Performed by: STUDENT IN AN ORGANIZED HEALTH CARE EDUCATION/TRAINING PROGRAM

## 2022-09-04 PROCEDURE — 96376 TX/PRO/DX INJ SAME DRUG ADON: CPT

## 2022-09-04 PROCEDURE — 36415 COLL VENOUS BLD VENIPUNCTURE: CPT

## 2022-09-04 PROCEDURE — 80048 BASIC METABOLIC PNL TOTAL CA: CPT

## 2022-09-04 PROCEDURE — G0378 HOSPITAL OBSERVATION PER HR: HCPCS

## 2022-09-04 PROCEDURE — 74011250637 HC RX REV CODE- 250/637: Performed by: STUDENT IN AN ORGANIZED HEALTH CARE EDUCATION/TRAINING PROGRAM

## 2022-09-04 PROCEDURE — 74011250637 HC RX REV CODE- 250/637: Performed by: INTERNAL MEDICINE

## 2022-09-04 RX ORDER — LISINOPRIL 40 MG/1
40 TABLET ORAL DAILY
Qty: 30 TABLET | Refills: 0 | Status: SHIPPED | OUTPATIENT
Start: 2022-09-04 | End: 2022-09-04 | Stop reason: SDUPTHER

## 2022-09-04 RX ORDER — VERAPAMIL HYDROCHLORIDE 80 MG/1
80 TABLET ORAL 4 TIMES DAILY
Qty: 120 TABLET | Refills: 0 | Status: SHIPPED | OUTPATIENT
Start: 2022-09-04 | End: 2022-09-04

## 2022-09-04 RX ORDER — LISINOPRIL 20 MG/1
40 TABLET ORAL DAILY
Status: DISCONTINUED | OUTPATIENT
Start: 2022-09-04 | End: 2022-09-04 | Stop reason: HOSPADM

## 2022-09-04 RX ORDER — CARVEDILOL 25 MG/1
25 TABLET ORAL 2 TIMES DAILY
Qty: 60 TABLET | Refills: 0 | Status: SHIPPED | OUTPATIENT
Start: 2022-09-04 | End: 2022-09-04

## 2022-09-04 RX ORDER — VERAPAMIL HYDROCHLORIDE 80 MG/1
80 TABLET ORAL 4 TIMES DAILY
Status: DISCONTINUED | OUTPATIENT
Start: 2022-09-04 | End: 2022-09-04 | Stop reason: HOSPADM

## 2022-09-04 RX ORDER — LISINOPRIL 40 MG/1
20 TABLET ORAL DAILY
Qty: 15 TABLET | Refills: 0 | Status: SHIPPED | OUTPATIENT
Start: 2022-09-04 | End: 2022-10-04

## 2022-09-04 RX ADMIN — ACETAMINOPHEN 650 MG: 325 TABLET, FILM COATED ORAL at 08:43

## 2022-09-04 RX ADMIN — ATORVASTATIN CALCIUM 40 MG: 40 TABLET, FILM COATED ORAL at 10:42

## 2022-09-04 RX ADMIN — LEVOTHYROXINE SODIUM 75 MCG: 0.07 TABLET ORAL at 07:43

## 2022-09-04 RX ADMIN — TIZANIDINE 4 MG: 4 TABLET ORAL at 02:26

## 2022-09-04 RX ADMIN — CARVEDILOL 25 MG: 12.5 TABLET, FILM COATED ORAL at 10:42

## 2022-09-04 RX ADMIN — HYDRALAZINE HYDROCHLORIDE 20 MG: 20 INJECTION, SOLUTION INTRAMUSCULAR; INTRAVENOUS at 05:29

## 2022-09-04 RX ADMIN — LISINOPRIL 40 MG: 20 TABLET ORAL at 10:42

## 2022-09-04 RX ADMIN — APIXABAN 5 MG: 5 TABLET, FILM COATED ORAL at 10:42

## 2022-09-04 RX ADMIN — VERAPAMIL HYDROCHLORIDE 40 MG: 80 TABLET ORAL at 07:43

## 2022-09-04 NOTE — PROGRESS NOTES
I have reviewed discharge instructions with the patient and daughter. The patient and daughter verbalized understanding. Pt is AO x4   Has to make cardio follow up appointment  Pt spoke to MD about what meds to continue to take post discharge. Pt verbalized understanding.   IV taken out  Belongings returned   Pt wheeled to lobby with family as ride home

## 2022-09-04 NOTE — CONSULTS
[facility] Cardiology Consult Note    Date of consult:  09/04/22  Date of admission: 9/2/2022  Primary Cardiologist: Dr Chinedu Salgado  Physician Requesting consult: Dr Tiffany Torrez / Reason for consult: Chest pain, HTN    History of the presenting illness:  Diane Womack is a 80 y.o. F who presented to the ER on 9/2 with complaints of chest pressure. A Cardiology consult was entered electronically but I was not contacted about it until late yesterday. She tells me that her main symptoms were more generalized malaise and just felt non-specifically unwell. She is not having chest pain / shortness of breath. Her BP was noted to be around 653 systolic at home, so she came to the ER. On review of [facility] records, this is not a new issue for her. She seems to have trouble with orthostatic hypotension, which limits ability to get good control of resting numbers while lying / sitting. Dr Anali Shea has documented that when we have treated to lower her supine BP, this has resulted in syncope. Other cardiac issues include prior Takotsubo cardiomyopathy in 2019 with near normal coronary arteries on cath. Cardiac MRI in March 2022 showed normalization of EF. She has also had PAF recently well controlled. Past Medical History:   Diagnosis Date    Arrhythmia     attempted ablation    Arthritis     GERD (gastroesophageal reflux disease)     High cholesterol     History of seasonal allergies     Hypertension     Sleep apnea     mild-does not use CPAP    Thyroid disease        Prior to Admission medications    Medication Sig Start Date End Date Taking? Authorizing Provider   tiZANidine (ZANAFLEX) 4 mg capsule Take 4 mg by mouth DIALYSIS PRN. Yes Other, MD Rosa   verapamiL (CALAN) 40 mg tablet Take 40 mg by mouth. Yes Other, MD Rosa   Lactobac no.41/Bifidobact no.7 (PROBIOTIC-10 PO) Take  by mouth. Yes Other, MD Rosa   docusate sodium (Stool Softener) 100 mg capsule Take 100 mg by mouth two (2) times a day. Yes Other, MD Rosa   Comp. Stocking,Thigh,Long,Large misc Dx:orthoststic hypotension. 21  Yes Lakisha Mcguire MD   apixaban (ELIQUIS) 5 mg tablet Take 5 mg by mouth two (2) times a day. Yes Mae, MD Rosa   atorvastatin (LIPITOR) 40 mg tablet Take 40 mg by mouth daily. Yes Mae, MD Rosa   levothyroxine (SYNTHROID) 75 mcg tablet Take 75 mcg by mouth Daily (before breakfast). Yes Provider, Historical   co-enzyme Q-10 (CO Q-10) 100 mg capsule Take 100 mg by mouth daily. Yes Provider, Historical   traMADoL (ULTRAM) 50 mg tablet Take 50 mg by mouth every six (6) hours as needed for Pain. Other, MD Rosa       No Known Allergies     Family History   Problem Relation Age of Onset    Other Mother         multiple myeloma    Stroke Father     Heart Disease Brother         ablation     No pertinent CV family history    Social History     Socioeconomic History    Marital status:      Spouse name: Not on file    Number of children: Not on file    Years of education: Not on file    Highest education level: Not on file   Occupational History    Not on file   Tobacco Use    Smoking status: Former     Years: 10.00     Types: Cigarettes     Quit date: 1974     Years since quittin.3     Passive exposure: Past    Smokeless tobacco: Never   Vaping Use    Vaping Use: Never used   Substance and Sexual Activity    Alcohol use:  Yes     Alcohol/week: 0.0 standard drinks     Types: 3 - 4 Glasses of wine per week    Drug use: No    Sexual activity: Not on file   Other Topics Concern    Not on file   Social History Narrative    Not on file     Social Determinants of Health     Financial Resource Strain: Not on file   Food Insecurity: Not on file   Transportation Needs: Not on file   Physical Activity: Not on file   Stress: Not on file   Social Connections: Not on file   Intimate Partner Violence: Not on file   Housing Stability: Not on file       Review of Systems   Constitutional:  Positive for malaise/fatigue. Negative for chills and fever. HENT:  Negative for hearing loss and nosebleeds. Eyes:  Negative for blurred vision and double vision. Respiratory:  Positive for shortness of breath. Negative for cough and sputum production. Cardiovascular:  Negative for chest pain. Gastrointestinal:  Negative for abdominal pain, nausea and vomiting. Genitourinary:  Negative for frequency and urgency. Musculoskeletal:  Negative for back pain and joint pain. Skin:  Negative for itching and rash. Neurological:  Negative for dizziness and headaches. Endo/Heme/Allergies:  Negative for environmental allergies. Does not bruise/bleed easily. Visit Vitals  BP (!) 156/83 (BP 1 Location: Right upper arm, BP Patient Position: At rest)   Pulse 74   Temp 97.4 °F (36.3 °C)   Resp 17   Ht 5' 8\" (1.727 m)   Wt 94.3 kg (208 lb)   SpO2 95%   BMI 31.63 kg/m²     Physical Exam  HENT:      Head: Normocephalic and atraumatic. Nose: Nose normal.   Eyes:      General: No scleral icterus. Conjunctiva/sclera: Conjunctivae normal.   Cardiovascular:      Rate and Rhythm: Normal rate and regular rhythm. Heart sounds: Normal heart sounds. No murmur heard. No gallop. Pulmonary:      Effort: Pulmonary effort is normal. No respiratory distress. Breath sounds: Normal breath sounds. No stridor. No wheezing. Abdominal:      General: There is no distension. Palpations: Abdomen is soft. Musculoskeletal:         General: No deformity. Normal range of motion. Skin:     General: Skin is warm and dry. Neurological:      General: No focal deficit present. Mental Status: She is alert.        Lab review:  BMP:   Lab Results   Component Value Date/Time     (L) 09/04/2022 05:15 AM    K 4.1 09/04/2022 05:15 AM    CL 98 09/04/2022 05:15 AM    CO2 29 09/04/2022 05:15 AM    AGAP 4 (L) 09/04/2022 05:15 AM     09/04/2022 05:15 AM    BUN 16 09/04/2022 05:15 AM    CREA 0.85 09/04/2022 05:15 AM    GFRAA >60 09/04/2022 05:15 AM    GFRNA >60 09/04/2022 05:15 AM        CBC:  Lab Results   Component Value Date/Time    WBC 8.7 09/04/2022 05:15 AM    HGB 12.2 09/04/2022 05:15 AM    HCT 37.1 09/04/2022 05:15 AM    PLATELET 512 11/84/8983 05:15 AM    MCV 96.1 09/04/2022 05:15 AM     High-sensitivity low specificity troponin was marginally elevated but flat (no rise and fall pattern), significance unclear but does not represent NSTEMI. May just be from her HTN. Data review:  EKG tracing personally reviewed: normal    Echocardiogram:  12/19/21    ECHO ADULT COMPLETE 12/25/2021 12/25/2021    Interpretation Summary  Formatting of this result is different from the original.      Left Ventricle: Left ventricle size is normal. Increased wall thickness. Normal wall motion. Normal left ventricular systolic function. Right Ventricle: Reduced systolic function. Left Atrium: Left atrium is dilated. No pericardial effusion    Signed by: Fabricio Benavides MD on 12/25/2021  1:50 PM    CTA Chest 9/2/22  IMPRESSION     1. No evidence of aortic dissection  2. No acute pulmonary embolus  3. Gallstones. No evidence of acute cholecystitis    Assessment & Recommendations / Plan:    Hypertensive urgency, not emergency. As above, need to base management off standing systolic BP rather than supine reading due to her h/o syncope from orthostatic hypotension    - Can be managed as outpatient  - For now suggest we resume lisinopril 40mg daily, continue coreg and verapamil.  - does not need stress test or echo - these have been canceled. Orthostatic hypotension  Complicates management as above, and has resulted in syncope  - BP readings should be checked standing    Paroxysmal atrial fibrillation  Maintaining NSR  - continue beta blocker and Eliquis    Home today  FU with Dr Rennie Phoenix    Signed:  Guy Koch.  [facility]  Interventional Cardiology  09/04/22

## 2022-09-04 NOTE — PROGRESS NOTES
Problem: Falls - Risk of  Goal: *Absence of Falls  Description: Document Clydene Bone Fall Risk and appropriate interventions in the flowsheet.   Outcome: Progressing Towards Goal  Note: Fall Risk Interventions:            Medication Interventions: Evaluate medications/consider consulting pharmacy, Patient to call before getting OOB, Teach patient to arise slowly                   Problem: Patient Education: Go to Patient Education Activity  Goal: Patient/Family Education  Outcome: Progressing Towards Goal     Problem: Pain  Goal: *Control of Pain  Outcome: Progressing Towards Goal

## 2022-09-04 NOTE — DISCHARGE SUMMARY
Discharge Summary       PATIENT ID: Marlin Thomas  MRN: [id number]   YOB: 1940    DATE OF ADMISSION: 9/2/2022  2:55 PM    DATE OF DISCHARGE: 09/04/22    PRIMARY CARE PROVIDER: Lisa Mckeon MD     ATTENDING PHYSICIAN: Kaitlynn Ludwig MD   DISCHARGING PROVIDER: Kaitlynn Ludwig MD    To contact this individual call [phone] and ask the  to page. If unavailable ask to be transferred the Adult Hospitalist Department. CONSULTATIONS: IP CONSULT TO CARDIOLOGY    PROCEDURES/SURGERIES: * No surgery found *    ADMITTING DIAGNOSES & HOSPITAL COURSE:   HPI: Navneet Rodriguez is a 80 y.o. female with past medical history of arthritis, GERD, hypertension, hyperlipidemia, atrial fibrillation on Eliquis, sleep apnea, and thyroid disease presented as a direct admission/ transfer from [facility] ([facility]) to [facility]) with chief complaint of \"I just felt funny. \". Symptom onset began yesterday, with vaguely described symptoms of \"not feeling right\". Per ER reports, patient had chest pain,  but patient is reluctant to admit to chest pain. She alludes to have some chest discomfort, of questionable location, severity, character, or specific aggravating/ alleviating factors. She vaguely describes possible lightheadedness. Patient also complained of high systolic blood pressure reading in 200s. She reportedly called her PCP office and was told to take Lisinopril 40 mg po yesterday morning. Patient then went to [facility] where initial recorded vital signs were T 97.9 F, /107, HR 68, RR 16, O2sat 100% on room air. Troponin levels were  17 and 27 respectively. Abnormal labs included BUN 28, creatinine 1.17, GFR 45, Na 135. CTA  chest with IV contrast showed no acute process. ED ordered Labetalol 20 mg IV x 2, Nitroglycerin 0.4 mg sublingual x 1, Aspirin 162 mg po x 1, and Tylenol 1000 mg po x 1. Patient was then transferred to [facility].   She is now seen for admission to the hospitalist service. \"    Patient managed for troponin elevation, no active chest pain on admission. Evaluated by cardiology no further echo or nuclear stress test needed. Patient had longstanding orthostatic hypotension with supine hypertension very difficult to control long-term. Discussed with patient over the phone patient is to remain on home dose of verapamil 40 mg 3 times daily, she describes symptoms as orthostasis with her lisinopril being increased outpatient by her cardiologist to 40 mg recently recommend continuing on 20 mg lisinopril for now and continue verapamil, keep blood pressure log, readjustments outpatient with PCP, cardiology. Counseled on slow positional movements. discussed that we can start additional medications for orthostasis as she already has been hypertension supine and this could worsen if we were to initiate midodrine/Florinef which was discussed with patient. She is in agreement. Stable for discharge from cardiology standpoint as well. DC home today.           DISCHARGE DIAGNOSES / PLAN:      Troponin elevation  -no active cp currently  -Cardiology consulted appreciate recommendations, patient orthostatic positive with hypertension supine, patient had been on 20 mg lisinopril, verapamil 3 times daily with good symptom control we will continue this regimen, follow-up outpatient with cardiology for readjustments  -dc coreg as could worsen her orthostasis  - No need for stress test, echo  - Continue aspirin, statin  -CTA chest neg for pe     Uncontrolled hypertension  - As discussed above, also on verapamil patient's home med    Longstanding orthostatic hypotension  - As discussed above    A. fib  - Continue Eliquis, rate controlled    Hyperlipidemia  - Continue statin    CARRILLO  - Continue IV fluid, improved      Obesity  - Counseled on diet weight loss     Code status: Full  Prophylaxis: Eliquis  Care Plan discussed with: Patient/family, nurse  Anticipated Disposition: Home FOLLOW UP APPOINTMENTS:    Follow-up Information       Follow up With Specialties Details Why Contact Info    Shannan Granados MD Family Medicine Call in 1 day(s)  [address] [facility]  [address] [facility]  [phone]      Yury Martinez  [facility], Interventional Cardiology Physician, Cardiovascular Disease Physician Call in 1 day(s)  [address] [facility]  [phone]      Rafaela Adams, [address] [facility] Vascular Surgery, Clinical Cardiac Electrophysiology Physician Call in 1 day(s)  Gabbie Murdock  [phone]               ADDITIONAL CARE RECOMMENDATIONS: Repeat CBC, BMP 3 to 5 days    DIET: Resume previous diet    ACTIVITY: Activity as tolerated      DISCHARGE MEDICATIONS:  Current Discharge Medication List        START taking these medications    Details   lisinopriL (PRINIVIL, ZESTRIL) 40 mg tablet Take 1 Tablet by mouth daily for 30 days. Qty: 30 Tablet, Refills: 0  Start date: 9/4/2022, End date: 10/4/2022           CONTINUE these medications which have NOT CHANGED    Details   tiZANidine (ZANAFLEX) 4 mg capsule Take 4 mg by mouth DIALYSIS PRN. verapamiL (CALAN) 40 mg tablet Take 40 mg by mouth. Lactobac no.41/Bifidobact no.7 (PROBIOTIC-10 PO) Take  by mouth. docusate sodium (Stool Softener) 100 mg capsule Take 100 mg by mouth two (2) times a day. Comp. Stocking,Thigh,Long,Large misc Dx:orthoststic hypotension. Qty: 1 Each, Refills: 0      apixaban (ELIQUIS) 5 mg tablet Take 5 mg by mouth two (2) times a day. atorvastatin (LIPITOR) 40 mg tablet Take 40 mg by mouth daily. levothyroxine (SYNTHROID) 75 mcg tablet Take 75 mcg by mouth Daily (before breakfast). co-enzyme Q-10 (CO Q-10) 100 mg capsule Take 100 mg by mouth daily. traMADoL (ULTRAM) 50 mg tablet Take 50 mg by mouth every six (6) hours as needed for Pain.                NOTIFY YOUR PHYSICIAN FOR ANY OF THE FOLLOWING: Fever over 101 degrees for 24 hours. Chest pain, shortness of breath, fever, chills, nausea, vomiting, diarrhea, change in mentation, falling, weakness, bleeding. Severe pain or pain not relieved by medications. Or, any other signs or symptoms that you may have questions about. DISPOSITION:    Home With:   OT  PT  HH  RN       Long term SNF/Inpatient Rehab   x Independent/assisted living    Hospice    Other:       PATIENT CONDITION AT DISCHARGE:     Functional status    Poor     Deconditioned    x Independent      Cognition   x  Lucid     Forgetful     Dementia          Code status   x  Full code     DNR      PHYSICAL EXAMINATION AT DISCHARGE:     I had a face to face encounter with this patient and independently examined them on 9/4/2022 as outlined below:                                                       Constitutional:  No acute distress, cooperative, pleasant    ENT:  Oral mucosa moist, oropharynx benign. Resp:  CTA bilaterally. No wheezing/rhonchi/rales. No accessory muscle use. CV:  Regular rhythm, normal rate, no murmurs, gallops, rubs    GI:  Soft, non distended, non tender. normoactive bowel sounds, no hepatosplenomegaly     Musculoskeletal:  No edema, warm, 2+ pulses throughout, RLE has some bruising noted around the ankle chronic for patient     Neurologic:  Moves all extremities.   AAOx3, CN II-XII reviewed         CHRONIC MEDICAL DIAGNOSES:  Problem List as of 9/4/2022 Date Reviewed: 12/20/2021            Codes Class Noted - Resolved    Hypertensive emergency ICD-10-CM: I16.1  ICD-9-CM: 401.9  12/20/2021 - Present        Palpitations ICD-10-CM: R00.2  ICD-9-CM: 785.1  12/19/2021 - Present        Chest pain ICD-10-CM: R07.9  ICD-9-CM: 786.50  12/19/2021 - Present        LBBB (left bundle branch block) ICD-10-CM: I44.7  ICD-9-CM: 426.3  4/8/2019 - Present        Acute chest pain ICD-10-CM: R07.9  ICD-9-CM: 786.50  4/8/2019 - Present        Uncontrolled hypertension ICD-10-CM: I10  ICD-9-CM: 401.9  4/8/2019 - Present        RESOLVED: Chest pain ICD-10-CM: R07.9  ICD-9-CM: 786.50  4/8/2019 - 8/3/2021           Greater than 30 minutes were spent with the patient on counseling and coordination of care    Signed:   Lisa Mcguire MD  9/4/2022  1:31 PM

## 2023-01-26 NOTE — ED NOTES
Patient given discharge papers and instructions by primary RN. Patient verbalized understanding and stated not having any questions or concerns regarding her care. Patient ambulatory out of ED with family. Yes...

## (undated) DEVICE — ANGIOGRAPHIC CATHETER: Brand: IMPULSE™

## (undated) DEVICE — PINNACLE INTRODUCER SHEATH: Brand: PINNACLE

## (undated) DEVICE — TRAP SURG QUAD PARABOLA SLOT DSGN SFTY SCRN TRAPEASE

## (undated) DEVICE — PACK PROCEDURE SURG HRT CATH

## (undated) DEVICE — KIT MED IMAG CNTRST AGNT W/ IOPAMIDOL REUSE

## (undated) DEVICE — KIT MFLD ISOLATN NACL CNTRST PRT TBNG SPIK W/ PRSS TRNSDUC

## (undated) DEVICE — ANGIOGRAPHY KIT

## (undated) DEVICE — TUBING HYDR IRR --

## (undated) DEVICE — SNARE VASC L240CM LOOP W10MM SHTH DIA2.4MM RND STIFF CLD

## (undated) DEVICE — CUSTOM KT PTCA INFL DEV K05 00052M

## (undated) DEVICE — KIT HND CTRL 3 W STPCOCK ROT END 54IN PREM HI PRSS TBNG AT

## (undated) DEVICE — Device: Brand: PADPRO